# Patient Record
Sex: MALE | Race: OTHER | NOT HISPANIC OR LATINO | URBAN - METROPOLITAN AREA
[De-identification: names, ages, dates, MRNs, and addresses within clinical notes are randomized per-mention and may not be internally consistent; named-entity substitution may affect disease eponyms.]

---

## 2022-01-28 ENCOUNTER — EMERGENCY (EMERGENCY)
Facility: HOSPITAL | Age: 47
LOS: 1 days | Discharge: HOME | End: 2022-01-28
Attending: EMERGENCY MEDICINE
Payer: MEDICARE

## 2022-01-28 VITALS
SYSTOLIC BLOOD PRESSURE: 164 MMHG | TEMPERATURE: 99 F | RESPIRATION RATE: 18 BRPM | HEART RATE: 82 BPM | DIASTOLIC BLOOD PRESSURE: 76 MMHG | OXYGEN SATURATION: 99 %

## 2022-01-28 VITALS
OXYGEN SATURATION: 99 % | SYSTOLIC BLOOD PRESSURE: 181 MMHG | HEART RATE: 87 BPM | WEIGHT: 165.35 LBS | HEIGHT: 70 IN | DIASTOLIC BLOOD PRESSURE: 92 MMHG | TEMPERATURE: 98 F | RESPIRATION RATE: 20 BRPM

## 2022-01-28 LAB
ALBUMIN SERPL ELPH-MCNC: 4.7 G/DL — SIGNIFICANT CHANGE UP (ref 3.3–5)
ALP SERPL-CCNC: 84 U/L — SIGNIFICANT CHANGE UP (ref 40–120)
ALT FLD-CCNC: 35 U/L — SIGNIFICANT CHANGE UP (ref 10–45)
ANION GAP SERPL CALC-SCNC: 14 MMOL/L — SIGNIFICANT CHANGE UP (ref 5–17)
APTT BLD: 37.5 SEC — HIGH (ref 27.5–35.5)
AST SERPL-CCNC: 24 U/L — SIGNIFICANT CHANGE UP (ref 10–40)
BASOPHILS # BLD AUTO: 0.03 K/UL — SIGNIFICANT CHANGE UP (ref 0–0.2)
BASOPHILS NFR BLD AUTO: 0.3 % — SIGNIFICANT CHANGE UP (ref 0–2)
BILIRUB SERPL-MCNC: 0.1 MG/DL — LOW (ref 0.2–1.2)
BUN SERPL-MCNC: 16 MG/DL — SIGNIFICANT CHANGE UP (ref 7–23)
CALCIUM SERPL-MCNC: 10.1 MG/DL — SIGNIFICANT CHANGE UP (ref 8.4–10.5)
CHLORIDE SERPL-SCNC: 101 MMOL/L — SIGNIFICANT CHANGE UP (ref 96–108)
CO2 SERPL-SCNC: 24 MMOL/L — SIGNIFICANT CHANGE UP (ref 22–31)
CREAT SERPL-MCNC: 0.71 MG/DL — SIGNIFICANT CHANGE UP (ref 0.5–1.3)
CRP SERPL-MCNC: <3 MG/L — SIGNIFICANT CHANGE UP (ref 0–4)
EOSINOPHIL # BLD AUTO: 0.19 K/UL — SIGNIFICANT CHANGE UP (ref 0–0.5)
EOSINOPHIL NFR BLD AUTO: 1.6 % — SIGNIFICANT CHANGE UP (ref 0–6)
GLUCOSE SERPL-MCNC: 97 MG/DL — SIGNIFICANT CHANGE UP (ref 70–99)
HCT VFR BLD CALC: 43.8 % — SIGNIFICANT CHANGE UP (ref 39–50)
HGB BLD-MCNC: 14.9 G/DL — SIGNIFICANT CHANGE UP (ref 13–17)
IMM GRANULOCYTES NFR BLD AUTO: 0.4 % — SIGNIFICANT CHANGE UP (ref 0–1.5)
INR BLD: 1.03 RATIO — SIGNIFICANT CHANGE UP (ref 0.88–1.16)
LYMPHOCYTES # BLD AUTO: 28.7 % — SIGNIFICANT CHANGE UP (ref 13–44)
LYMPHOCYTES # BLD AUTO: 3.38 K/UL — HIGH (ref 1–3.3)
MCHC RBC-ENTMCNC: 31.9 PG — SIGNIFICANT CHANGE UP (ref 27–34)
MCHC RBC-ENTMCNC: 34 GM/DL — SIGNIFICANT CHANGE UP (ref 32–36)
MCV RBC AUTO: 93.8 FL — SIGNIFICANT CHANGE UP (ref 80–100)
MONOCYTES # BLD AUTO: 0.68 K/UL — SIGNIFICANT CHANGE UP (ref 0–0.9)
MONOCYTES NFR BLD AUTO: 5.8 % — SIGNIFICANT CHANGE UP (ref 2–14)
NEUTROPHILS # BLD AUTO: 7.45 K/UL — HIGH (ref 1.8–7.4)
NEUTROPHILS NFR BLD AUTO: 63.2 % — SIGNIFICANT CHANGE UP (ref 43–77)
NRBC # BLD: 0 /100 WBCS — SIGNIFICANT CHANGE UP (ref 0–0)
PLATELET # BLD AUTO: 253 K/UL — SIGNIFICANT CHANGE UP (ref 150–400)
POTASSIUM SERPL-MCNC: 3.8 MMOL/L — SIGNIFICANT CHANGE UP (ref 3.5–5.3)
POTASSIUM SERPL-SCNC: 3.8 MMOL/L — SIGNIFICANT CHANGE UP (ref 3.5–5.3)
PROT SERPL-MCNC: 7.8 G/DL — SIGNIFICANT CHANGE UP (ref 6–8.3)
PROTHROM AB SERPL-ACNC: 12.3 SEC — SIGNIFICANT CHANGE UP (ref 10.6–13.6)
RBC # BLD: 4.67 M/UL — SIGNIFICANT CHANGE UP (ref 4.2–5.8)
RBC # FLD: 12.8 % — SIGNIFICANT CHANGE UP (ref 10.3–14.5)
SARS-COV-2 RNA SPEC QL NAA+PROBE: SIGNIFICANT CHANGE UP
SODIUM SERPL-SCNC: 139 MMOL/L — SIGNIFICANT CHANGE UP (ref 135–145)
WBC # BLD: 11.78 K/UL — HIGH (ref 3.8–10.5)
WBC # FLD AUTO: 11.78 K/UL — HIGH (ref 3.8–10.5)

## 2022-01-28 PROCEDURE — 86140 C-REACTIVE PROTEIN: CPT

## 2022-01-28 PROCEDURE — 73630 X-RAY EXAM OF FOOT: CPT

## 2022-01-28 PROCEDURE — 80053 COMPREHEN METABOLIC PANEL: CPT

## 2022-01-28 PROCEDURE — 85730 THROMBOPLASTIN TIME PARTIAL: CPT

## 2022-01-28 PROCEDURE — 85025 COMPLETE CBC W/AUTO DIFF WBC: CPT

## 2022-01-28 PROCEDURE — 36415 COLL VENOUS BLD VENIPUNCTURE: CPT

## 2022-01-28 PROCEDURE — 99284 EMERGENCY DEPT VISIT MOD MDM: CPT | Mod: 25

## 2022-01-28 PROCEDURE — 96374 THER/PROPH/DIAG INJ IV PUSH: CPT

## 2022-01-28 PROCEDURE — 87205 SMEAR GRAM STAIN: CPT

## 2022-01-28 PROCEDURE — 73630 X-RAY EXAM OF FOOT: CPT | Mod: 26,RT

## 2022-01-28 PROCEDURE — 99284 EMERGENCY DEPT VISIT MOD MDM: CPT

## 2022-01-28 PROCEDURE — 99243 OFF/OP CNSLTJ NEW/EST LOW 30: CPT | Mod: GC

## 2022-01-28 PROCEDURE — 87070 CULTURE OTHR SPECIMN AEROBIC: CPT

## 2022-01-28 PROCEDURE — 85652 RBC SED RATE AUTOMATED: CPT

## 2022-01-28 PROCEDURE — U0003: CPT

## 2022-01-28 PROCEDURE — 93923 UPR/LXTR ART STDY 3+ LVLS: CPT

## 2022-01-28 PROCEDURE — 85610 PROTHROMBIN TIME: CPT

## 2022-01-28 PROCEDURE — U0005: CPT

## 2022-01-28 RX ORDER — MUPIROCIN 20 MG/G
1 OINTMENT TOPICAL
Qty: 1 | Refills: 0
Start: 2022-01-28 | End: 2022-02-03

## 2022-01-28 RX ORDER — AMPICILLIN SODIUM AND SULBACTAM SODIUM 250; 125 MG/ML; MG/ML
3 INJECTION, POWDER, FOR SUSPENSION INTRAMUSCULAR; INTRAVENOUS EVERY 6 HOURS
Refills: 0 | Status: DISCONTINUED | OUTPATIENT
Start: 2022-01-28 | End: 2022-01-28

## 2022-01-28 RX ORDER — AMPICILLIN SODIUM AND SULBACTAM SODIUM 250; 125 MG/ML; MG/ML
3 INJECTION, POWDER, FOR SUSPENSION INTRAMUSCULAR; INTRAVENOUS ONCE
Refills: 0 | Status: COMPLETED | OUTPATIENT
Start: 2022-01-28 | End: 2022-01-28

## 2022-01-28 RX ORDER — PIPERACILLIN AND TAZOBACTAM 4; .5 G/20ML; G/20ML
3.38 INJECTION, POWDER, LYOPHILIZED, FOR SOLUTION INTRAVENOUS ONCE
Refills: 0 | Status: DISCONTINUED | OUTPATIENT
Start: 2022-01-28 | End: 2022-01-28

## 2022-01-28 RX ORDER — MUPIROCIN 20 MG/G
1 OINTMENT TOPICAL
Refills: 0 | Status: DISCONTINUED | OUTPATIENT
Start: 2022-01-28 | End: 2022-02-01

## 2022-01-28 RX ADMIN — AMPICILLIN SODIUM AND SULBACTAM SODIUM 200 GRAM(S): 250; 125 INJECTION, POWDER, FOR SUSPENSION INTRAMUSCULAR; INTRAVENOUS at 18:20

## 2022-01-28 RX ADMIN — MUPIROCIN 1 APPLICATION(S): 20 OINTMENT TOPICAL at 19:47

## 2022-01-28 NOTE — ED PROVIDER NOTE - SKIN WOUND TYPE
R foot: + 1cm ulcer with minimal yellow drainage to medial aspect of R 5th toe; + mild 5th toe erythema, no other skin changes, sensation intact, ROM of toes intact, +DP pulse R foot: + 1cm ulcer with minimal yellow drainage to medial aspect of R 5th toe; + mild 5th toe erythema, no other skin changes, sensation intact, ROM of toes intact, + faint DP pulse

## 2022-01-28 NOTE — ED ADULT NURSE NOTE - OBJECTIVE STATEMENT
46yr old male w/ pmhx of PAD, HTN and smoker for 20 years presents to ED c/o toe wound. pt states about 3 weeks ago he develop blue/red discoloration on his right 5th toe, as a few weeks went by the discoloration progressed to a non-healing wound. Pt went to his PCP who referred him to a podiatrist, who then sent pt to a vascular surgeon. Pt states his vascular surgeon advised him to come to ED for concern of necrosis of the toe and states he may need angiogram. Pt is able to ambulate without difficulty. he denies any trauma to the toe, numbness, tingling of affected area, Fevers/chills, SOB, CP. Pt has full sensation and ROM of toes, +2 pedal pulses noted upon palpation. Pt assessed by MD, bed lowered and locked, call bell within reach. will reassess

## 2022-01-28 NOTE — ED PROVIDER NOTE - OBJECTIVE STATEMENT
47yo M with HTN, PAD (no stents), smoker (x 20 yrs) presenting with non-healing wound to R 5th toe x 3 weeks. Reports first noticed blue discoloration to toe 3 weeks ago, then wound developed, and PCP sent pt to podiatrist. Pt reports he took course of Augmentin however wound is worsening. Podiatrist sent patient to vascular surgeon Dr. Chino who sent patient in today with concern for necrotic wound and for angiogram. Patient reports he is able to ambulate. Denies any fever/chills, n/v, numbness/tingling, wounds like this before, h/o DM. 47yo M with HTN, PAD (no stents), smoker (x 20 yrs) presenting with non-healing wound to R 5th toe x 3 weeks. Reports first noticed blue discoloration to toe 3 weeks ago, then wound developed, and PCP sent pt to podiatrist. Pt reports he took course of Augmentin however wound is worsening. Podiatrist sent patient to vascular surgeon Dr. Chino who sent patient in today with concern for necrotic wound and for angiogram. Patient reports he is able to ambulate, denies leg pain despite triage note. Denies any fever/chills, n/v, numbness/tingling, wounds like this before, h/o DM.

## 2022-01-28 NOTE — ED PROVIDER NOTE - PROVIDER TOKENS
PROVIDER:[TOKEN:[[id number]],FOLLOWUP:[1-3 Days]],PROVIDER:[TOKEN:[[id number]],FOLLOWUP:[1-3 Days]]

## 2022-01-28 NOTE — ED PROVIDER NOTE - PATIENT PORTAL LINK FT
You can access the FollowMyHealth Patient Portal offered by [facility] by registering at the following website: [email or web address]. By joining [facility]’s FollowMyHealth portal, you will also be able to view your health information using other applications (apps) compatible with our system.

## 2022-01-28 NOTE — ED PROVIDER NOTE - PROGRESS NOTE DETAILS
Left msg with Dr. Chino's service. - Fina Salazar PA-C Spoke with Dr. Chino, saw patient for first time today. Reports concern for severe PAD, ischemic R foot, sent patient in for vascular eval. Vascular paged. - Fina Salazar PA-C Pt seen by podiatry, exam does not correlate with OM noted on xray, recommending d/c home on mupirocin and augmentin x 1 week as pt reported he did improve after taking previous course of abx. Dr. Reese confirmed that VIN study will not be performed after 5pm, pt to get as outpatient. Vascular recommending f/u with Dr. Dsouza. - CRIS BaconC

## 2022-01-28 NOTE — ED PROVIDER NOTE - CARE PROVIDERS DIRECT ADDRESSES
,[email or web address]

## 2022-01-28 NOTE — ED PROVIDER NOTE - ATTENDING CONTRIBUTION TO CARE
45yo M with HTN, PAD (no stents), smoker (x 20 yrs) presenting with non-healing wound to R 5th toe x 3 weeks with redness to outter portion, possible cold related has been outside a lot, finished course of augmentin for one week, better as per patient, saw outpt vascular today sent in for work up, no skin changes, plus one pedial dorsalis pulse, small redness, no ulceration to toe has seen podiatry, no h/o dm, pos smoker, labs, films, vascular consulted.

## 2022-01-28 NOTE — CONSULT NOTE ADULT - ASSESSMENT
46M PMHx HTN, current smoker presenting with non-healing R foot wound. Vascular Surgery consulted for evaluation of PAD.     PLAN:   - No indication for acute vascular surgical interventions  - Obtain VIN/PVRs  - Interventions pending above studies  - Wound care   - Continue abx   - Appreciate Podiatry eval    Plan to be Discussed with Fellow  Plan to be discussed with Attending, Dr. Dsouza  Vascular Surgery  [id number]

## 2022-01-28 NOTE — ED PROVIDER NOTE - CARE PROVIDER_API CALL
Chester Dsouza)  Vascular Surgery  [address], [address] [place] [address]  Phone: [phone]  Fax: [phone]  Follow Up Time: 1-3 Days    Masoud Becker (DPM)  Surgery Orthopaedic Surgery  [address], [address]  [place] [address]  Phone: [phone]  Fax: [phone]  Follow Up Time: 1-3 Days

## 2022-01-28 NOTE — ED PROVIDER NOTE - NSFOLLOWUPINSTRUCTIONS_ED_ALL_ED_FT
Please apply mupirocin (antibiotic ointment) 2-3 times daily to wound and cover with band-aid for 1 week.    Take Augmentin (antibiotic) twice daily for 1 week.     Take Ibuprofen (i.e. Motrin, Advil) 600mg every 8 hrs for pain as needed. Take with food.   May alternate with Acetaminophen (Tylenol) 650mg every 6 hours for pain as needed.     Please follow up with Vascular Surgeon and Podiatry upon discharge.   Information provided.     Return to ER for any fever/chills, nausea/vomiting, increased pain/swelling/redness of toe/foot, persistent yellow/green drainage from wound, spreading redness, unable to walk, or any other concerns.

## 2022-01-28 NOTE — CONSULT NOTE ADULT - SUBJECTIVE AND OBJECTIVE BOX
Podiatry pager #: [phone] ([facility])/ [phone] ([facility])    Patient is a 46y old  Male who presents with a chief complaint of right foot 5th digit wound     HPI:  45yo M with HTN, PAD (no stents), smoker (x 20 yrs) presenting with non-healing wound to R 5th toe x 3 weeks. Reports first noticed blue discoloration to toe 3 weeks ago, then wound developed, and PCP sent pt to podiatrist. Pt reports he took course of Augmentin however wound is worsening. Podiatrist sent patient to vascular surgeon Dr. Chino who sent patient in today with concern for necrotic wound and for angiogram. Patient reports he is able to ambulate, denies leg pain despite triage note. Denies any fever/chills, n/v, numbness/tingling, wounds like this before, h/o DM.    PAST MEDICAL & SURGICAL HISTORY:      MEDICATIONS  (STANDING):  ampicillin/sulbactam  IVPB 3 Gram(s) IV Intermittent every 6 hours    MEDICATIONS  (PRN):      Allergies    No Known Allergies    Intolerances        VITALS:    Vital Signs Last 24 Hrs  T(C): 37.3 (28 Jan 2022 16:54), Max: 37.3 (28 Jan 2022 16:54)  T(F): 99.1 (28 Jan 2022 16:54), Max: 99.1 (28 Jan 2022 16:54)  HR: 79 (28 Jan 2022 16:54) (79 - 87)  BP: 160/85 (28 Jan 2022 16:54) (160/85 - 181/92)  BP(mean): --  RR: 19 (28 Jan 2022 16:54) (19 - 20)  SpO2: 98% (28 Jan 2022 16:54) (98% - 99%)    LABS:                          14.9   11.78 )-----------( 253      ( 28 Jan 2022 17:29 )             43.8               CAPILLARY BLOOD GLUCOSE          PT/INR - ( 28 Jan 2022 17:29 )   PT: 12.3 sec;   INR: 1.03 ratio         PTT - ( 28 Jan 2022 17:29 )  PTT:37.5 sec    LOWER EXTREMITY PHYSICAL EXAM:    Vascular: DP/PT 0/4 B/L, CFT >3 seconds B/L, Temperature gradient warm to cool B/L  Neuro: Epicritic sensation intact to the level of digits B/L  Musculoskeletal/Ortho: unremarkable   Skin: Right foot distal medial 5th digit wound to subq, fibrogranular, no probing to bone, no fluctuance, no drainage, no malodor, ischemic/dusky appearance of distal 5th digit to level of PIPJ  Ischemic changes to b/l feet distal aspect of digits 1-5 R>L     RADIOLOGY & ADDITIONAL STUDIES:    < from: Xray Foot AP + Lateral + Oblique, Right (01.28.22 @ 17:29) >    ******PRELIMINARY REPORT******      ******PRELIMINARY REPORT******       ACC: [id number] EXAM:  XR FOOT COMP MIN 3 VIEWS RT                          PROCEDURE DATE:  01/28/2022    ******PRELIMINARY REPORT******      ******PRELIMINARY REPORT******           INTERPRETATION:  Cortical eorision of the fifth middle phalax, concerning   for osteomyelitis.        ******PRELIMINARY REPORT******      ******PRELIMINARY REPORT******       AIME STONE MD; Resident Radiology    < end of copied text >  
VASCULAR SURGERY CONSULT NOTE  JENIFFER HE  |  [id number]  |  01-28-22 @ 18:56    Patient was seen and examined at: 18:30    CC: Patient is a 46y old  Male who presents with a chief complaint of non-healing R 5th toe wound  HPI:  47yo M with HTN, PAD (no stents), smoker (x 20 yrs) presenting with non-healing wound to R 5th toe x 3 weeks. Reports first noticed blue discoloration to toe 3 weeks ago, then wound developed, and PCP sent pt to podiatrist. Pt reports he took course of Augmentin however wound is worsening. Podiatrist sent patient to vascular surgeon Dr. Chino who sent patient in today with concern for necrotic wound and for angiogram. Patient reports he is able to ambulate, denies leg pain despite triage note. Denies any fever/chills, n/v, numbness/tingling, wounds like this before, h/o DM.    Vascular Surgery consulted for evaluation for PAD. Patient reports first noticing R 5th toe wound around end of December, does not recall trauma, falls, inciting injury to toe. Some associated pain, only when toe is manipulated. Able to independently ambulate, denies rest pain or claudication. Notes no drainage, no purulence. When wound wasn't improving, went to PCP who sent patient to podiatrist, was placed on Augmentin which he completed with no changes to wound. Podiatrist sent patient to Vascular Surgeon Dr. Chino, who performed VIN/PVRs (unclear what they showed) and recommended angiogram and sent patient to ED. Denies f/c, SOB/CP/VILLATORO, weakness, fatigue, motor/sensory changes of BLE    In ED, patient afebrile and hemodynamically stable, WBC 11.7, XR R foot c/f OM.       REVIEW OF SYSTEMS:  General: denies weight change, fever or fatigue  HEENT: denies sore throat, hoarseness  Respiratory: denies cough, shortness of breath at rest and on exertion, wheezing  Cardiovascular: denies chest pain, abnormal heart rhythm, PND, palpitations  Gastrointestinal: denies nausea, vomiting, diarrhea, bloody or black bowel movements  Genitourinary: denies frequent urination, painful urination, kidney disease  Neurological: denies seizures, headaches  Muscoloskeletal: denies any joint pains  Psychiatric: denies depression, anxiety    PAST MEDICAL & SURGICAL HISTORY:  HTN    MEDICATIONS  (STANDING):  mupirocin 2% Ointment 1 Application(s) Topical two times a day    MEDICATIONS  (PRN):    Allergies  No Known Allergies    SOCIAL HISTORY:  Occupation: construction  Smoking Hx: 20ppd for past 20 years  Etoh Hx: quit 2 years ago, used to drink 5-6 beers and/or "a few shots" a day   IVDA Hx: denies    FAMILY HISTORY:  Father with smoking history, CAD, ?bypass    Objective:   Vital Signs Last 24 Hrs  T(C): 37.2 (28 Jan 2022 19:54), Max: 37.3 (28 Jan 2022 16:54)  T(F): 99 (28 Jan 2022 19:54), Max: 99.1 (28 Jan 2022 16:54)  HR: 82 (28 Jan 2022 19:54) (79 - 87)  BP: 164/76 (28 Jan 2022 19:54) (160/85 - 181/92)  BP(mean): --  RR: 18 (28 Jan 2022 19:54) (18 - 20)  SpO2: 99% (28 Jan 2022 19:54) (98% - 99%)  CAPILLARY BLOOD GLUCOSE    Physical Exam:  General: NAD, Lying in bed comfortably  Neuro: A+Ox3  Cardio: RRR  Resp: Good effort  GI/Abd: Soft, NT/ND, no rebound/guarding, no masses palpated  Vascular: All 4 extremities warm, B/l radial pulses palpable, b/l Femoral/popliteal/DP/PT pulse palpable  Musculoskeletal: All 4 extremities moving spontaneously, no limitations  Wound: R 5th toe with fibrinous exudate,     LABS:                        14.9   11.78 )-----------( 253      ( 28 Jan 2022 17:29 )             43.8     01-28    139  |  101  |  16  ----------------------------<  97  3.8   |  24  |  0.71    Ca    10.1      28 Jan 2022 17:29    TPro  7.8  /  Alb  4.7  /  TBili  0.1<L>  /  DBili  x   /  AST  24  /  ALT  35  /  AlkPhos  84  01-28    PT/INR - ( 28 Jan 2022 17:29 )   PT: 12.3 sec;   INR: 1.03 ratio         PTT - ( 28 Jan 2022 17:29 )  PTT:37.5 sec  LIVER FUNCTIONS - ( 28 Jan 2022 17:29 )  Alb: 4.7 g/dL / Pro: 7.8 g/dL / ALK PHOS: 84 U/L / ALT: 35 U/L / AST: 24 U/L / GGT: x           RADIOLOGY & ADDITIONAL STUDIES:  < from: Xray Foot AP + Lateral + Oblique, Right (01.28.22 @ 17:29) >  ACC: [id number] EXAM:  XR FOOT COMP MIN 3 VIEWS RT                          PROCEDURE DATE:  01/28/2022    ******PRELIMINARY REPORT******      ******PRELIMINARY REPORT******           INTERPRETATION:  Cortical eorision of the fifth middle phalax, concerning   for osteomyelitis.

## 2022-01-28 NOTE — CONSULT NOTE ADULT - ASSESSMENT
45yo M w/ right foot 5th digit wound  - pt seen and evaluated  - afebrile to 99.1, WBC 11.78, ESR/CRP pending  - Right foot distal medial 5th digit wound to subq, fibrogranular, no probing to bone, no fluctuance, no drainage, no malodor, ischemic/dusky appearance of distal 5th digit to level of PIPJ; Ischemic changes to b/l feet distal aspect of digits 1-5 R>L  - Left foot no open wounds or lesions   - Right foot xray preliminary read reports cortical erosion of 5th middle phalanx concerning for OM -- clinically the wound does not correlate with this location of erosion, low concern clinically for OM 2/2 wound does not probe to bone   - Right foot wound culture taken  - recommend application of mupirocin daily to right foot wound w/ bandaid  - recommend vasc consult   - ordered VIN/PVR   - no acute pod surgical intervention necessary at this time   - pod plan [facility] if pt is to be admitted for vascular workup, otherwise would recommend monitoring outpatient in office w/ d/c on PO Augmentin for 1 week  - discussed w/ attending   - discussed w/ attending

## 2022-01-29 LAB — ERYTHROCYTE [SEDIMENTATION RATE] IN BLOOD: 36 MM/HR — HIGH (ref 0–15)

## 2022-01-29 NOTE — ED POST DISCHARGE NOTE - ADDITIONAL DOCUMENTATION
2/2/22- daughter called, concerned about getting her father a rapid appointment. spoke to our f/up coordinator will call reach out to them again and help.

## 2022-01-29 NOTE — ED POST DISCHARGE NOTE - DETAILS
Left voicemail informing that I had results to discuss with the patient, gave call back number. -Brenden Lopez PA-C Pt and daughter called back, results discussed, pt reports pain feeling improved compared to time of ED visit. daughter reports may have difficulty following with Dr. Dsouza due to pt lack of insurance, pt information given to referral coordinator and daughter informed that they will be called in 2 business days. -Brenden Lopez PA-C

## 2022-02-02 LAB
CULTURE RESULTS: SIGNIFICANT CHANGE UP
SPECIMEN SOURCE: SIGNIFICANT CHANGE UP

## 2022-02-04 ENCOUNTER — APPOINTMENT (OUTPATIENT)
Dept: FAMILY MEDICINE | Facility: HOSPITAL | Age: 47
End: 2022-02-04

## 2022-02-04 ENCOUNTER — OUTPATIENT (OUTPATIENT)
Dept: OUTPATIENT SERVICES | Facility: HOSPITAL | Age: 47
LOS: 1 days | End: 2022-02-04
Payer: SELF-PAY

## 2022-02-04 VITALS — SYSTOLIC BLOOD PRESSURE: 167 MMHG | DIASTOLIC BLOOD PRESSURE: 89 MMHG

## 2022-02-04 VITALS
SYSTOLIC BLOOD PRESSURE: 169 MMHG | OXYGEN SATURATION: 99 % | TEMPERATURE: 97.6 F | DIASTOLIC BLOOD PRESSURE: 103 MMHG | RESPIRATION RATE: 16 BRPM | WEIGHT: 162 LBS | HEIGHT: 67.75 IN | HEART RATE: 72 BPM | BODY MASS INDEX: 24.84 KG/M2

## 2022-02-04 DIAGNOSIS — Z00.00 ENCOUNTER FOR GENERAL ADULT MEDICAL EXAMINATION WITHOUT ABNORMAL FINDINGS: ICD-10-CM

## 2022-02-04 PROCEDURE — 36415 COLL VENOUS BLD VENIPUNCTURE: CPT

## 2022-02-04 PROCEDURE — 83036 HEMOGLOBIN GLYCOSYLATED A1C: CPT

## 2022-02-04 PROCEDURE — G0463: CPT

## 2022-02-04 PROCEDURE — 86803 HEPATITIS C AB TEST: CPT

## 2022-02-04 PROCEDURE — 87389 HIV-1 AG W/HIV-1&-2 AB AG IA: CPT

## 2022-02-04 PROCEDURE — 80061 LIPID PANEL: CPT

## 2022-02-05 DIAGNOSIS — Z87.891 PERSONAL HISTORY OF NICOTINE DEPENDENCE: ICD-10-CM

## 2022-02-05 DIAGNOSIS — I73.9 PERIPHERAL VASCULAR DISEASE, UNSPECIFIED: ICD-10-CM

## 2022-02-05 DIAGNOSIS — I10 ESSENTIAL (PRIMARY) HYPERTENSION: ICD-10-CM

## 2022-02-05 DIAGNOSIS — Z29.9 ENCOUNTER FOR PROPHYLACTIC MEASURES, UNSPECIFIED: ICD-10-CM

## 2022-02-09 ENCOUNTER — APPOINTMENT (OUTPATIENT)
Dept: PODIATRY | Facility: HOSPITAL | Age: 47
End: 2022-02-09

## 2022-02-10 LAB
CHOLEST SERPL-MCNC: 199 MG/DL
ESTIMATED AVERAGE GLUCOSE: 126 MG/DL
HBA1C MFR BLD HPLC: 6 %
HCV AB SER QL: NONREACTIVE
HCV S/CO RATIO: 0.2 S/CO
HDLC SERPL-MCNC: 38 MG/DL
HIV1+2 AB SPEC QL IA.RAPID: NONREACTIVE
LDLC SERPL CALC-MCNC: 127 MG/DL
NONHDLC SERPL-MCNC: 161 MG/DL
TRIGL SERPL-MCNC: 167 MG/DL

## 2022-02-11 ENCOUNTER — APPOINTMENT (OUTPATIENT)
Dept: FAMILY MEDICINE | Facility: HOSPITAL | Age: 47
End: 2022-02-11

## 2022-02-11 ENCOUNTER — OUTPATIENT (OUTPATIENT)
Dept: OUTPATIENT SERVICES | Facility: HOSPITAL | Age: 47
LOS: 1 days | End: 2022-02-11
Payer: MEDICAID

## 2022-02-11 VITALS
OXYGEN SATURATION: 100 % | WEIGHT: 157 LBS | TEMPERATURE: 96.8 F | DIASTOLIC BLOOD PRESSURE: 76 MMHG | BODY MASS INDEX: 24.05 KG/M2 | HEART RATE: 70 BPM | SYSTOLIC BLOOD PRESSURE: 162 MMHG | RESPIRATION RATE: 14 BRPM

## 2022-02-11 DIAGNOSIS — Z00.00 ENCOUNTER FOR GENERAL ADULT MEDICAL EXAMINATION W/OUT ABNORMAL FINDINGS: ICD-10-CM

## 2022-02-11 DIAGNOSIS — Z00.00 ENCOUNTER FOR GENERAL ADULT MEDICAL EXAMINATION WITHOUT ABNORMAL FINDINGS: ICD-10-CM

## 2022-02-11 PROCEDURE — G0463: CPT

## 2022-02-11 NOTE — PHYSICAL EXAM
[Normal] : no carotid or abdominal bruits heard, no varicosities, pedal pulses are present, no peripheral edema, no extremity clubbing or cyanosis and no palpable aorta [de-identified] : small healing ulcer on 5th toe of right foot. non erythematous

## 2022-02-17 DIAGNOSIS — I10 ESSENTIAL (PRIMARY) HYPERTENSION: ICD-10-CM

## 2022-02-17 DIAGNOSIS — I73.9 PERIPHERAL VASCULAR DISEASE, UNSPECIFIED: ICD-10-CM

## 2022-02-23 NOTE — PHYSICAL EXAM
[Normal] : affect was normal and insight and judgment were intact [de-identified] : small healing ulcer on 5th toe of right foot. non erythematous

## 2022-02-23 NOTE — HISTORY OF PRESENT ILLNESS
[FreeTextEntry1] : CPE [de-identified] : 45 y/o male w/ PMH HTN, PAD presents for CPE. PAtient last seen earlier in the month  for post ED discharge visit. Pt  in the ER at [place] for a foot lesion on his right foot, possible necrotic in nature. Dr. Chino, vascular surgeon, performed a VIN which was abnormal and recommended an angiogram with possible stents and was referred to this practice. Patient smoked 1 pack a day for over 25 years and quick a week ago. Patient with no other complaints. Denies fevers, chills, pain on foot, SOB, chest pain. \par

## 2022-02-23 NOTE — PLAN
[FreeTextEntry1] : # HCM\par - Labs reviewed\par - counseled pt on diet and exercise\par - renewed medications\par - HIV and Hep c screen negative\par - RTC for possible CSP/ FIt test\par - Covid vaccinated x2- Refused flu vaccine\par \par #HTN\par - 162/76 in clinic, REPEAT 156/75\par -  Patient states he forgot to take his HTN medication today in the AM\par - CONTINUE HCTZ25 mg, metoprolol tartrate 25 mg and ramiprl 10mg daily\par \par #Prediabetes/HLD\par - counseled on diet and exercise\par - Pt would like to try to lose weight before starting additional medications\par \par RTC in 1-2 weekf for CSP/FIT test\par RTC for next cardio clinic as patient most likely has CAD due to history of PAD\par \par d/w Dr. Bruce

## 2022-02-23 NOTE — HEALTH RISK ASSESSMENT
[Good] : ~his/her~  mood as  good [Former] : Former [0-4] : 0-4 [Yes] : Yes [Never (0 pts)] : Never (0 points) [1 or 2 (0 pts)] : 1 or 2 (0 points) [Less than monthly (1 pt)] : Less than monthly (1 point) [No] : In the past 12 months have you used drugs other than those required for medical reasons? No [No falls in past year] : Patient reported no falls in the past year [0] : 2) Feeling down, depressed, or hopeless: Not at all (0) [PHQ-2 Negative - No further assessment needed] : PHQ-2 Negative - No further assessment needed [None] : None [With Family] : lives with family [Feels Safe at Home] : Feels safe at home [Fully functional (bathing, dressing, toileting, transferring, walking, feeding)] : Fully functional (bathing, dressing, toileting, transferring, walking, feeding) [Fully functional (using the telephone, shopping, preparing meals, housekeeping, doing laundry, using] : Fully functional and needs no help or supervision to perform IADLs (using the telephone, shopping, preparing meals, housekeeping, doing laundry, using transportation, managing medications and managing finances) [Smoke Detector] : smoke detector [Carbon Monoxide Detector] : carbon monoxide detector [Seat Belt] :  uses seat belt [Sunscreen] : uses sunscreen [Patient/Caregiver unclear of wishes] : , patient/caregiver unclear of wishes [YearQuit] : 2022 [de-identified] : "socially" [Change in mental status noted] : No change in mental status noted [Reports changes in vision] : Reports no changes in vision [HIVDate] : 02/2022 [HepatitisCDate] : 02/2022

## 2022-02-24 ENCOUNTER — APPOINTMENT (OUTPATIENT)
Dept: FAMILY MEDICINE | Facility: HOSPITAL | Age: 47
End: 2022-02-24

## 2022-03-03 ENCOUNTER — RESULT CHARGE (OUTPATIENT)
Age: 47
End: 2022-03-03

## 2022-03-04 ENCOUNTER — APPOINTMENT (OUTPATIENT)
Dept: CARDIOLOGY | Facility: HOSPITAL | Age: 47
End: 2022-03-04

## 2022-03-04 ENCOUNTER — OUTPATIENT (OUTPATIENT)
Dept: OUTPATIENT SERVICES | Facility: HOSPITAL | Age: 47
LOS: 1 days | End: 2022-03-04
Payer: SELF-PAY

## 2022-03-04 VITALS
SYSTOLIC BLOOD PRESSURE: 134 MMHG | HEART RATE: 79 BPM | TEMPERATURE: 99.2 F | RESPIRATION RATE: 16 BRPM | OXYGEN SATURATION: 98 % | DIASTOLIC BLOOD PRESSURE: 78 MMHG

## 2022-03-04 DIAGNOSIS — I35.8 OTHER NONRHEUMATIC AORTIC VALVE DISORDERS: ICD-10-CM

## 2022-03-04 DIAGNOSIS — Z00.00 ENCOUNTER FOR GENERAL ADULT MEDICAL EXAMINATION WITHOUT ABNORMAL FINDINGS: ICD-10-CM

## 2022-03-04 PROCEDURE — 93010 ELECTROCARDIOGRAM REPORT: CPT

## 2022-03-04 PROCEDURE — 93005 ELECTROCARDIOGRAM TRACING: CPT

## 2022-03-04 PROCEDURE — G0463: CPT

## 2022-03-07 DIAGNOSIS — I35.8 OTHER NONRHEUMATIC AORTIC VALVE DISORDERS: ICD-10-CM

## 2022-03-07 DIAGNOSIS — I73.9 PERIPHERAL VASCULAR DISEASE, UNSPECIFIED: ICD-10-CM

## 2022-03-08 ENCOUNTER — NON-APPOINTMENT (OUTPATIENT)
Age: 47
End: 2022-03-08

## 2022-03-09 ENCOUNTER — OUTPATIENT (OUTPATIENT)
Dept: OUTPATIENT SERVICES | Facility: HOSPITAL | Age: 47
LOS: 1 days | End: 2022-03-09
Payer: SELF-PAY

## 2022-03-09 ENCOUNTER — NON-APPOINTMENT (OUTPATIENT)
Age: 47
End: 2022-03-09

## 2022-03-09 ENCOUNTER — RESULT REVIEW (OUTPATIENT)
Age: 47
End: 2022-03-09

## 2022-03-09 DIAGNOSIS — I73.9 PERIPHERAL VASCULAR DISEASE, UNSPECIFIED: ICD-10-CM

## 2022-03-09 PROCEDURE — 93010 ELECTROCARDIOGRAM REPORT: CPT

## 2022-03-09 PROCEDURE — 93306 TTE W/DOPPLER COMPLETE: CPT | Mod: 26

## 2022-03-09 PROCEDURE — 93016 CV STRESS TEST SUPVJ ONLY: CPT

## 2022-03-09 PROCEDURE — 93018 CV STRESS TEST I&R ONLY: CPT

## 2022-03-09 PROCEDURE — 93017 CV STRESS TEST TRACING ONLY: CPT

## 2022-03-09 PROCEDURE — 93005 ELECTROCARDIOGRAM TRACING: CPT

## 2022-03-09 PROCEDURE — 78452 HT MUSCLE IMAGE SPECT MULT: CPT | Mod: 26,MH

## 2022-03-09 PROCEDURE — 93306 TTE W/DOPPLER COMPLETE: CPT

## 2022-03-09 PROCEDURE — 78452 HT MUSCLE IMAGE SPECT MULT: CPT | Mod: MH

## 2022-03-09 PROCEDURE — A9500: CPT

## 2022-03-09 RX ORDER — REGADENOSON 0.08 MG/ML
0.4 INJECTION, SOLUTION INTRAVENOUS ONCE
Refills: 0 | Status: COMPLETED | OUTPATIENT
Start: 2022-03-09 | End: 2022-03-09

## 2022-03-09 RX ADMIN — REGADENOSON 0.4 MILLIGRAM(S): 0.08 INJECTION, SOLUTION INTRAVENOUS at 10:30

## 2022-03-14 ENCOUNTER — APPOINTMENT (OUTPATIENT)
Dept: VASCULAR SURGERY | Facility: HOSPITAL | Age: 47
End: 2022-03-14
Payer: COMMERCIAL

## 2022-03-14 ENCOUNTER — OUTPATIENT (OUTPATIENT)
Dept: OUTPATIENT SERVICES | Facility: HOSPITAL | Age: 47
LOS: 1 days | End: 2022-03-14
Payer: SELF-PAY

## 2022-03-14 VITALS
SYSTOLIC BLOOD PRESSURE: 159 MMHG | HEART RATE: 64 BPM | HEIGHT: 67.75 IN | BODY MASS INDEX: 24.07 KG/M2 | WEIGHT: 157 LBS | DIASTOLIC BLOOD PRESSURE: 93 MMHG | TEMPERATURE: 96.8 F

## 2022-03-14 DIAGNOSIS — I73.9 PERIPHERAL VASCULAR DISEASE, UNSPECIFIED: ICD-10-CM

## 2022-03-14 PROCEDURE — G0463: CPT

## 2022-03-14 PROCEDURE — XXXXX: CPT

## 2022-03-14 NOTE — HISTORY OF PRESENT ILLNESS
[FreeTextEntry1] : 46M presents for evaluation of PAD. He states that he has previously been seen at an OSH with a nonhealing R toe wound in Jan of this year but it has since healed. He complains of claudication in both legs and new R sided rest pain that has been present for a few weeks. It is only relieved when he dangles his R foot off of the bd. He currently takes ASA 81. He was a smoker who packed 1PPD for 20 years but quit one month ago. \par \par He was seen by an outpatient provider who performed VIN/PVRs and told him that he needed revascularization. However, he does not have those records at this time.

## 2022-03-14 NOTE — PHYSICAL EXAM
[Normal Rate and Rhythm] : normal rate and rhythm [2+] : left 2+ [0] : left 0 [No Rash or Lesion] : No rash or lesion [Alert] : alert [Oriented to Person] : oriented to person [Oriented to Place] : oriented to place [Oriented to Time] : oriented to time [Calm] : calm [Ankle Swelling (On Exam)] : not present [Varicose Veins Of Lower Extremities] : not present [] : not present [Purpura] : no purpura  [Petechiae] : no petechiae [Skin Ulcer] : no ulcer [Skin Induration] : no induration [de-identified] : NAD

## 2022-03-14 NOTE — ASSESSMENT
[FreeTextEntry1] : 46M presents with bilateral LE claudication and R sided rest pain.\par - Will obtain VIN/PVRs\par - Patient instructed to follow up after test complete

## 2022-03-14 NOTE — REVIEW OF SYSTEMS
[As noted in HPI] : as noted in HPI [Limb Pain] : limb pain [Negative] : Gastrointestinal [Limb Swelling] : no limb swelling [Skin Lesions] : no skin lesions [Skin Wound] : no skin wound

## 2022-03-18 ENCOUNTER — APPOINTMENT (OUTPATIENT)
Dept: FAMILY MEDICINE | Facility: HOSPITAL | Age: 47
End: 2022-03-18

## 2022-03-18 ENCOUNTER — OUTPATIENT (OUTPATIENT)
Dept: OUTPATIENT SERVICES | Facility: HOSPITAL | Age: 47
LOS: 1 days | End: 2022-03-18
Payer: SELF-PAY

## 2022-03-18 VITALS
WEIGHT: 166 LBS | TEMPERATURE: 97.7 F | RESPIRATION RATE: 16 BRPM | DIASTOLIC BLOOD PRESSURE: 72 MMHG | BODY MASS INDEX: 25.43 KG/M2 | HEART RATE: 73 BPM | OXYGEN SATURATION: 98 % | SYSTOLIC BLOOD PRESSURE: 138 MMHG

## 2022-03-18 DIAGNOSIS — Z00.00 ENCOUNTER FOR GENERAL ADULT MEDICAL EXAMINATION WITHOUT ABNORMAL FINDINGS: ICD-10-CM

## 2022-03-18 PROCEDURE — G0463: CPT

## 2022-03-21 DIAGNOSIS — R94.39 ABNORMAL RESULT OF OTHER CARDIOVASCULAR FUNCTION STUDY: ICD-10-CM

## 2022-03-21 DIAGNOSIS — I10 ESSENTIAL (PRIMARY) HYPERTENSION: ICD-10-CM

## 2022-03-21 NOTE — HISTORY OF PRESENT ILLNESS
[FreeTextEntry1] : abnormal stress test  [de-identified] : 45 y/o M with PMHx HTN, smoker ( for 20 year , 1 pack at day, quit 4 weeks ago) came to the clinic for follow up of CAD and abnormal stress test . Pt last time seen at clinic was for a cardiology evaluation after was found to have PAD with  abnormal VIN.At that time pt reported went to a cardiology many years ago and was told " almost had a heart attack" .A stress test and ECHO ordered and pt started on Atorvastatin and ASA. Pt had a stress test on 3/9/22 and showed moderate to severe reversible perfusion in the apical cap, mid inferior wall and angiogram recommended. Pt was schedule for a left cardiac cath on March 24, 2022 at 7 am. Today pt denies chest pain, being the last episode when was having the stress test. He reports have not been smoking and is taking statin, ASA and metoprolol.He denies palpitations, SOB at exertion, leg swelling and any other symptoms.

## 2022-03-21 NOTE — PLAN
[FreeTextEntry1] : 45 y/o M with PMHx HTN, smoker ( for 20 year , 1 pack at day, quit 4 weeks ago) came to the clinic for follow up of CAD and abnormal stress test.\par \par #Abnormal stress test \par - Pt had a stress test on 3/9/22 and showed moderate to severe reversible perfusion in the apical cap, mid inferior wall and angiogram recommended.\par - Pt was schedule for angiogram on 3/24/22 \par - Pt denies any episode of chest pain at this moment but endorse on/off chest pain in the past \par - Pt was advised about the possible diagnosis and the high probability of CAD. Left cath  procedure discussed with the patient. I also discussed about the possibility to need bypass surgery if cath reveasl occlusion in three or more coronary arteries \par - Pt was strongly advised to go to the ED if develop chest pain, palpitation, SOB and any others symptoms.\par - Contin ASA, statin and Metoprolol \par - Continue follow up with cardiology \par \par #PAD \par - Pt had and VIN on January ,2020 that was abnormal and was recommended an arteriogram \par - Pt followed with vascular surgery and recommended LE angiogram  \par - Pt advised about the importance to quit smoking \par - Continue ASA and Atorvastatin 40 mg PO daily \par - Recommended to exercises\par \par #Preventive measures \par - Refused flu vaccine \par \par RTC in 2 weeks after left cardiac cath \par \par Case discussed with Dr Bruce

## 2022-03-24 ENCOUNTER — INPATIENT (INPATIENT)
Facility: HOSPITAL | Age: 47
LOS: 4 days | Discharge: HOME HEALTH | DRG: 233 | End: 2022-03-29
Attending: THORACIC SURGERY (CARDIOTHORACIC VASCULAR SURGERY) | Admitting: INTERNAL MEDICINE
Payer: MEDICAID

## 2022-03-24 VITALS
HEART RATE: 75 BPM | DIASTOLIC BLOOD PRESSURE: 65 MMHG | RESPIRATION RATE: 14 BRPM | TEMPERATURE: 98 F | HEIGHT: 68 IN | SYSTOLIC BLOOD PRESSURE: 148 MMHG | WEIGHT: 154.98 LBS

## 2022-03-24 DIAGNOSIS — I10 ESSENTIAL (PRIMARY) HYPERTENSION: ICD-10-CM

## 2022-03-24 DIAGNOSIS — R94.39 ABNORMAL RESULT OF OTHER CARDIOVASCULAR FUNCTION STUDY: ICD-10-CM

## 2022-03-24 DIAGNOSIS — I25.10 ATHEROSCLEROTIC HEART DISEASE OF NATIVE CORONARY ARTERY WITHOUT ANGINA PECTORIS: ICD-10-CM

## 2022-03-24 LAB
ALBUMIN SERPL ELPH-MCNC: 4.3 G/DL — SIGNIFICANT CHANGE UP (ref 3.3–5)
ALP SERPL-CCNC: 64 U/L — SIGNIFICANT CHANGE UP (ref 40–120)
ALT FLD-CCNC: 23 U/L — SIGNIFICANT CHANGE UP (ref 10–45)
ANION GAP SERPL CALC-SCNC: 13 MMOL/L — SIGNIFICANT CHANGE UP (ref 5–17)
ANION GAP SERPL CALC-SCNC: 13 MMOL/L — SIGNIFICANT CHANGE UP (ref 5–17)
APPEARANCE UR: CLEAR — SIGNIFICANT CHANGE UP
APTT BLD: 32.5 SEC — SIGNIFICANT CHANGE UP (ref 27.5–35.5)
AST SERPL-CCNC: 14 U/L — SIGNIFICANT CHANGE UP (ref 10–40)
BILIRUB SERPL-MCNC: 0.5 MG/DL — SIGNIFICANT CHANGE UP (ref 0.2–1.2)
BILIRUB UR-MCNC: NEGATIVE — SIGNIFICANT CHANGE UP
BLD GP AB SCN SERPL QL: NEGATIVE — SIGNIFICANT CHANGE UP
BUN SERPL-MCNC: 16 MG/DL — SIGNIFICANT CHANGE UP (ref 7–23)
BUN SERPL-MCNC: 16 MG/DL — SIGNIFICANT CHANGE UP (ref 7–23)
CALCIUM SERPL-MCNC: 9.5 MG/DL — SIGNIFICANT CHANGE UP (ref 8.4–10.5)
CALCIUM SERPL-MCNC: 9.8 MG/DL — SIGNIFICANT CHANGE UP (ref 8.4–10.5)
CHLORIDE SERPL-SCNC: 98 MMOL/L — SIGNIFICANT CHANGE UP (ref 96–108)
CHLORIDE SERPL-SCNC: 99 MMOL/L — SIGNIFICANT CHANGE UP (ref 96–108)
CO2 SERPL-SCNC: 25 MMOL/L — SIGNIFICANT CHANGE UP (ref 22–31)
CO2 SERPL-SCNC: 28 MMOL/L — SIGNIFICANT CHANGE UP (ref 22–31)
COLOR SPEC: SIGNIFICANT CHANGE UP
CREAT SERPL-MCNC: 0.77 MG/DL — SIGNIFICANT CHANGE UP (ref 0.5–1.3)
CREAT SERPL-MCNC: 0.78 MG/DL — SIGNIFICANT CHANGE UP (ref 0.5–1.3)
DIFF PNL FLD: NEGATIVE — SIGNIFICANT CHANGE UP
EGFR: 110 ML/MIN/1.73M2 — SIGNIFICANT CHANGE UP
EGFR: 110 ML/MIN/1.73M2 — SIGNIFICANT CHANGE UP
FIBRINOGEN PPP-MCNC: 593 MG/DL — HIGH (ref 330–520)
GLUCOSE SERPL-MCNC: 105 MG/DL — HIGH (ref 70–99)
GLUCOSE SERPL-MCNC: 122 MG/DL — HIGH (ref 70–99)
GLUCOSE UR QL: NEGATIVE — SIGNIFICANT CHANGE UP
HCT VFR BLD CALC: 39.1 % — SIGNIFICANT CHANGE UP (ref 39–50)
HCT VFR BLD CALC: 44.1 % — SIGNIFICANT CHANGE UP (ref 39–50)
HGB BLD-MCNC: 13.2 G/DL — SIGNIFICANT CHANGE UP (ref 13–17)
HGB BLD-MCNC: 14.8 G/DL — SIGNIFICANT CHANGE UP (ref 13–17)
INR BLD: 1.11 RATIO — SIGNIFICANT CHANGE UP (ref 0.88–1.16)
KETONES UR-MCNC: NEGATIVE — SIGNIFICANT CHANGE UP
LEUKOCYTE ESTERASE UR-ACNC: NEGATIVE — SIGNIFICANT CHANGE UP
MAGNESIUM SERPL-MCNC: 2.1 MG/DL — SIGNIFICANT CHANGE UP (ref 1.6–2.6)
MCHC RBC-ENTMCNC: 31.6 PG — SIGNIFICANT CHANGE UP (ref 27–34)
MCHC RBC-ENTMCNC: 31.7 PG — SIGNIFICANT CHANGE UP (ref 27–34)
MCHC RBC-ENTMCNC: 33.6 GM/DL — SIGNIFICANT CHANGE UP (ref 32–36)
MCHC RBC-ENTMCNC: 33.8 GM/DL — SIGNIFICANT CHANGE UP (ref 32–36)
MCV RBC AUTO: 93.8 FL — SIGNIFICANT CHANGE UP (ref 80–100)
MCV RBC AUTO: 94.2 FL — SIGNIFICANT CHANGE UP (ref 80–100)
NITRITE UR-MCNC: NEGATIVE — SIGNIFICANT CHANGE UP
NRBC # BLD: 0 /100 WBCS — SIGNIFICANT CHANGE UP (ref 0–0)
NRBC # BLD: 0 /100 WBCS — SIGNIFICANT CHANGE UP (ref 0–0)
NT-PROBNP SERPL-SCNC: 36 PG/ML — SIGNIFICANT CHANGE UP (ref 0–300)
PA ADP PRP-ACNC: 253 PRU — SIGNIFICANT CHANGE UP (ref 194–417)
PH UR: 7 — SIGNIFICANT CHANGE UP (ref 5–8)
PLATELET # BLD AUTO: 216 K/UL — SIGNIFICANT CHANGE UP (ref 150–400)
PLATELET # BLD AUTO: 271 K/UL — SIGNIFICANT CHANGE UP (ref 150–400)
POTASSIUM SERPL-MCNC: 3.7 MMOL/L — SIGNIFICANT CHANGE UP (ref 3.5–5.3)
POTASSIUM SERPL-MCNC: 3.7 MMOL/L — SIGNIFICANT CHANGE UP (ref 3.5–5.3)
POTASSIUM SERPL-SCNC: 3.7 MMOL/L — SIGNIFICANT CHANGE UP (ref 3.5–5.3)
POTASSIUM SERPL-SCNC: 3.7 MMOL/L — SIGNIFICANT CHANGE UP (ref 3.5–5.3)
PROT SERPL-MCNC: 7 G/DL — SIGNIFICANT CHANGE UP (ref 6–8.3)
PROT UR-MCNC: SIGNIFICANT CHANGE UP
PROTHROM AB SERPL-ACNC: 12.8 SEC — SIGNIFICANT CHANGE UP (ref 10.5–13.4)
RBC # BLD: 4.17 M/UL — LOW (ref 4.2–5.8)
RBC # BLD: 4.68 M/UL — SIGNIFICANT CHANGE UP (ref 4.2–5.8)
RBC # FLD: 12.8 % — SIGNIFICANT CHANGE UP (ref 10.3–14.5)
RBC # FLD: 12.8 % — SIGNIFICANT CHANGE UP (ref 10.3–14.5)
RH IG SCN BLD-IMP: POSITIVE — SIGNIFICANT CHANGE UP
SODIUM SERPL-SCNC: 137 MMOL/L — SIGNIFICANT CHANGE UP (ref 135–145)
SODIUM SERPL-SCNC: 139 MMOL/L — SIGNIFICANT CHANGE UP (ref 135–145)
SP GR SPEC: 1.05 — HIGH (ref 1.01–1.02)
UROBILINOGEN FLD QL: NEGATIVE — SIGNIFICANT CHANGE UP
WBC # BLD: 10.14 K/UL — SIGNIFICANT CHANGE UP (ref 3.8–10.5)
WBC # BLD: 10.64 K/UL — HIGH (ref 3.8–10.5)
WBC # FLD AUTO: 10.14 K/UL — SIGNIFICANT CHANGE UP (ref 3.8–10.5)
WBC # FLD AUTO: 10.64 K/UL — HIGH (ref 3.8–10.5)

## 2022-03-24 PROCEDURE — 93571 IV DOP VEL&/PRESS C FLO 1ST: CPT | Mod: 26,LD

## 2022-03-24 PROCEDURE — 93010 ELECTROCARDIOGRAM REPORT: CPT

## 2022-03-24 PROCEDURE — 93306 TTE W/DOPPLER COMPLETE: CPT | Mod: 26

## 2022-03-24 PROCEDURE — 93880 EXTRACRANIAL BILAT STUDY: CPT | Mod: 26

## 2022-03-24 PROCEDURE — 93572 IV DOP VEL&/PRESS C FLO EA: CPT | Mod: 26,LC

## 2022-03-24 PROCEDURE — 99152 MOD SED SAME PHYS/QHP 5/>YRS: CPT

## 2022-03-24 PROCEDURE — 99223 1ST HOSP IP/OBS HIGH 75: CPT | Mod: 57

## 2022-03-24 PROCEDURE — 93454 CORONARY ARTERY ANGIO S&I: CPT | Mod: 26

## 2022-03-24 PROCEDURE — 99253 IP/OBS CNSLTJ NEW/EST LOW 45: CPT

## 2022-03-24 RX ORDER — AMLODIPINE BESYLATE 2.5 MG/1
5 TABLET ORAL DAILY
Refills: 0 | Status: DISCONTINUED | OUTPATIENT
Start: 2022-03-24 | End: 2022-03-25

## 2022-03-24 RX ORDER — METOPROLOL TARTRATE 50 MG
25 TABLET ORAL DAILY
Refills: 0 | Status: DISCONTINUED | OUTPATIENT
Start: 2022-03-24 | End: 2022-03-25

## 2022-03-24 RX ORDER — LISINOPRIL 2.5 MG/1
1 TABLET ORAL
Qty: 0 | Refills: 0 | DISCHARGE

## 2022-03-24 RX ORDER — ATORVASTATIN CALCIUM 80 MG/1
40 TABLET, FILM COATED ORAL AT BEDTIME
Refills: 0 | Status: DISCONTINUED | OUTPATIENT
Start: 2022-03-24 | End: 2022-03-25

## 2022-03-24 RX ORDER — ASPIRIN/CALCIUM CARB/MAGNESIUM 324 MG
81 TABLET ORAL DAILY
Refills: 0 | Status: DISCONTINUED | OUTPATIENT
Start: 2022-03-24 | End: 2022-03-25

## 2022-03-24 RX ORDER — HYDROCHLOROTHIAZIDE 25 MG
25 TABLET ORAL DAILY
Refills: 0 | Status: DISCONTINUED | OUTPATIENT
Start: 2022-03-24 | End: 2022-03-25

## 2022-03-24 RX ADMIN — Medication 25 MILLIGRAM(S): at 21:48

## 2022-03-24 RX ADMIN — Medication 81 MILLIGRAM(S): at 21:47

## 2022-03-24 RX ADMIN — AMLODIPINE BESYLATE 5 MILLIGRAM(S): 2.5 TABLET ORAL at 21:47

## 2022-03-24 RX ADMIN — ATORVASTATIN CALCIUM 40 MILLIGRAM(S): 80 TABLET, FILM COATED ORAL at 21:48

## 2022-03-24 NOTE — CONSULT NOTE ADULT - ASSESSMENT
This is a 45 y/o male with strong family Hx of CAD (both parents with CABG at 60's), HTN, former smoker (1ppd x 20 years, quit 1 month ago), intermittent claudication and diagnosed with PAD after abnormal VIN in Jan 2022, Rt 5th toe non healing ulcer in Jan 2022 treated with IV and PO antibiotics for (pt followed by vascular Dr. Chino). Patient presented to PCP with c/o intermittent CP for few weeks and had abnormal NST on 3/9/22 now s/p cardiac cath demonstrating multivessel CAD. CT Surgery consulted for CABG evaluation.

## 2022-03-24 NOTE — PATIENT PROFILE ADULT - FALL HARM RISK - HARM RISK INTERVENTIONS

## 2022-03-24 NOTE — H&P CARDIOLOGY - NSICDXPASTMEDICALHX_GEN_ALL_CORE_FT
PAST MEDICAL HISTORY:  HTN (hypertension)     PAD (peripheral artery disease) Had recent po ABX treatment for Rt foot toe ulcer

## 2022-03-24 NOTE — CONSULT NOTE ADULT - PROBLEM SELECTOR RECOMMENDATION 9
Continue asa 81 daily, ToprolXL 25 daily, atorvastatin 40 HS  Continue pre-op cardiac surgery workup  Check TTE/US Carotids/PFTs  Check P2y12  Vein mapping B/L LE  Tentative plan for CABG Sat 3/26 with Dr. Laguerre

## 2022-03-24 NOTE — H&P CARDIOLOGY - HISTORY OF PRESENT ILLNESS
46 years old male with PMHx of HTN, smoker (1ppd x 20 years, quit 1 month ago), PAD with abnormal VIN in Jan 2020 (pt followed with vascular surgery and LE was recommended) who reports intermittent CP and was evaluated and underwent stress test on 3/9/22.  Stress test showed moderate to severe reversible perfusion in the apical cap, mid inferior wall, basal inferior wall suggestive of ischemia and referred for [facility] for further ischemic evaluation for which he presents today.  No implantable cardiac monitoring device.  Denies CP, dizziness, diaphoresis, palpitations, nausea, vomiting, peripheral edema, recent weight gain, or pre-syncope/syncope.     Card: Roselia Tabor Dr 46 years old male with PMHx of HTN, smoker (1ppd x 20 years, quit 1 month ago), PAD with abnormal VIN in Jan 2020 (pt followed with vascular surgery and LE was recommended) who reports intermittent CP and was evaluated and underwent stress test on 3/9/22.  Stress test showed moderate to severe reversible perfusion in the apical cap, apical inferior, mid inferior wall, basal inferior wall suggestive of ischemia and referred for [facility] for further ischemic evaluation for which he presents today.  No implantable cardiac monitoring device.  Denies CP, dizziness, diaphoresis, palpitations, nausea, vomiting, peripheral edema, recent weight gain, or pre-syncope/syncope.     Card: Roselia Tabor Dr    < from: NM Nuclear Stress Pharmacologic Multiple (03.09.22 @ 11:48) >  IMPRESSION:  Abnormal SPECT Myocardial Perfusion Imaging post rest and   post vasodilator.  Patient developed chest pain during the test. Electrocardiograph was   reported as abnormal: patient developed horizontal and downsloping   inferolateral ST depressions (leads I, II, III, aVf, V4-V6) and ST   elevation in lead aVR.  There is a moderate-sized reversible perfusion defect in the apical cap,   apical inferior, mid inferior and basal inferior walls suggestive of   ischemia. Bowel tracer uptake reduces sensitivity of study       Normal left ventricular wall motion with ejection fraction of 71 %   (normal: 50% or greater).       No regional wall motion abnormalities.  Recommend clinical correlation with the above findings.      Please refer to cardiac stress test report.    Comparison: No prior study available.    --- End of Report ---    < end of copied text >   46 years old male with PMHx of HTN, smoker (1ppd x 20 years, quit 1 month ago), PAD with abnormal VIN in Jan 2020 (pt followed with vascular surgery and LE was recommended) who reports intermittent CP and was evaluated and underwent stress test on 3/9/22.  Stress test showed moderate to severe reversible perfusion in the apical cap, apical inferior, mid inferior wall, basal inferior wall suggestive of ischemia and referred for [facility] for further ischemic evaluation for which he presents today.  No implantable cardiac monitoring device.  Denies CP, dizziness, diaphoresis, palpitations, nausea, vomiting, peripheral edema, recent weight gain, or pre-syncope/syncope.     Card: Roselia Tabor Dr    < from: NM Nuclear Stress Pharmacologic Multiple (03.09.22 @ 11:48) >  IMPRESSION:  Abnormal SPECT Myocardial Perfusion Imaging post rest and   post vasodilator.  Patient developed chest pain during the test. Electrocardiograph was   reported as abnormal: patient developed horizontal and downsloping   inferolateral ST depressions (leads I, II, III, aVf, V4-V6) and ST   elevation in lead aVR.  There is a moderate-sized reversible perfusion defect in the apical cap,   apical inferior, mid inferior and basal inferior walls suggestive of   ischemia. Bowel tracer uptake reduces sensitivity of study       Normal left ventricular wall motion with ejection fraction of 71 %   (normal: 50% or greater).       No regional wall motion abnormalities.  Recommend clinical correlation with the above findings.      Please refer to cardiac stress test report.    Comparison: No prior study available.    --- End of Report ---    < end of copied text >      Summary:   1. Normal global left ventricular systolic function.   2. Left ventricular ejection fraction, by visual estimation, is 65 to   70%.   3. Mildly increased LV wall thickness.   4. Normal left ventricular internal cavity size.   5. Normal right ventricular size and function.   6. The left atrium is normal in size.   7. The right atrium is normal in size.   8. Mild mitral valve regurgitation.   9. Aortic valve leaflet calcification. No aortic valve stenosis.  10. There is no evidence of pericardial effusion.    [id number] Vidal James MD Electronically signed on   3/9/2022 at 2:30:29 PM     46 years old male with strong family Hx of CAD (both parents with CABG at 60's) HTN, smoker (1ppd x 20 years, quit 1 month ago), complained of worsening of leg pain while walking was diagnosed with PAD after abnormal VIN in Jan 2022, recently had to be treated with IV and PO antibiotics for Rt 5th toe non healing ulcer in Jan 2022 (pt followed with vascular dr Chino and LE was recommended).  Patient also endorsed intermittent CP for weeks and was evaluated by Dr Shea and underwent stress test on 3/9/22.  Stress test showed moderate to severe reversible perfusion in the apical cap, apical inferior, mid inferior wall, basal inferior wall suggestive of ischemia and referred for [facility] for further ischemic evaluation for which he presents today.  No implantable cardiac monitoring device.  Currently denies CP, dizziness, palpitations, nausea, vomiting, peripheral edema, recent weight gain, or pre-syncope/syncope.      Card: Roselia Tabor Dr    < from: NM Nuclear Stress Pharmacologic Multiple (03.09.22 @ 11:48) >  IMPRESSION:  Abnormal SPECT Myocardial Perfusion Imaging post rest and   post vasodilator.  Patient developed chest pain during the test. Electrocardiograph was   reported as abnormal: patient developed horizontal and downsloping   inferolateral ST depressions (leads I, II, III, aVf, V4-V6) and ST   elevation in lead aVR.  There is a moderate-sized reversible perfusion defect in the apical cap,   apical inferior, mid inferior and basal inferior walls suggestive of   ischemia. Bowel tracer uptake reduces sensitivity of study       Normal left ventricular wall motion with ejection fraction of 71 %   (normal: 50% or greater).       No regional wall motion abnormalities.  Recommend clinical correlation with the above findings.      Please refer to cardiac stress test report.    Comparison: No prior study available.    --- End of Report ---    < end of copied text >      Summary:   1. Normal global left ventricular systolic function.   2. Left ventricular ejection fraction, by visual estimation, is 65 to   70%.   3. Mildly increased LV wall thickness.   4. Normal left ventricular internal cavity size.   5. Normal right ventricular size and function.   6. The left atrium is normal in size.   7. The right atrium is normal in size.   8. Mild mitral valve regurgitation.   9. Aortic valve leaflet calcification. No aortic valve stenosis.  10. There is no evidence of pericardial effusion.    [id number] Vidal James MD Electronically signed on   3/9/2022 at 2:30:29 PM

## 2022-03-24 NOTE — CONSULT NOTE ADULT - NS ATTEND AMEND GEN_ALL_CORE FT
45 y/o male unstable angina, sever tvd,  EF nL.  For cabg  Targets LAD, OM, poss PL.  STS risk 1-2%.  Risks/benefits d/w pt and daughter, agree to proceed.

## 2022-03-24 NOTE — CONSULT NOTE ADULT - SUBJECTIVE AND OBJECTIVE BOX
History of Present Illness:  46 years old male with strong family Hx of CAD (both parents with CABG at 60's) HTN, smoker (1ppd x 20 years, quit 1 month ago), complained of worsening of leg pain while walking was diagnosed with PAD after abnormal VIN in Jan 2022, recently had to be treated with IV and PO antibiotics for Rt 5th toe non healing ulcer in Jan 2022 (pt followed with vascular dr Chino and LE was recommended).  Patient also endorsed intermittent CP for weeks and was evaluated by Dr Shea and underwent stress test on 3/9/22.  Stress test showed moderate to severe reversible perfusion in the apical cap, apical inferior, mid inferior wall, basal inferior wall suggestive of ischemia and referred for [facility] for further ischemic evaluation for which he presents today.  No implantable cardiac monitoring device.  Currently denies CP, dizziness, palpitations, nausea, vomiting, peripheral edema, recent weight gain, or pre-syncope/syncope.      Card: Roselia Tabor Dr     (24 Mar 2022 10:35)       Past Medical History  HTN (hypertension)    PAD (peripheral artery disease)  Had recent po ABX treatment for Rt foot toe ulcer        Past Surgical History  No significant past surgical history        MEDICATIONS  (STANDING):  aspirin enteric coated 81 milliGRAM(s) Oral daily  atorvastatin 40 milliGRAM(s) Oral at bedtime  hydrochlorothiazide 25 milliGRAM(s) Oral daily  metoprolol succinate ER 25 milliGRAM(s) Oral daily      Vital Signs Last 24 Hrs  T(C): 36.7 (03-24-22 @ 10:32), Max: 36.7 (03-24-22 @ 10:32)  T(F): 98.1 (03-24-22 @ 10:32), Max: 98.1 (03-24-22 @ 10:32)  HR: 73 (03-24-22 @ 16:25) (67 - 81)  BP: 143/82 (03-24-22 @ 16:25) (123/77 - 163/87)  RR: 15 (03-24-22 @ 16:25) (14 - 16)  SpO2: 99% (03-24-22 @ 16:25) (95% - 100%)             Height (cm): 172.7    Weight (kg): 70.3    BMI (kg/m2): 23.6    (24 Mar 2022 10:35)      Allergies: No Known Allergies      SOCIAL HISTORY:  , lives with spouse, wood worker   Smoker: [ ] Yes  [X] No                 Former smoker, quit 1 month ago, 1ppd x 25 years  ETOH use: [ ] Yes  [X] No              Quit 3 years ago, 2-3 drinks daily prior  Ilicit Drug use:  [ ] Yes  [X] No       Pt denies      FAMILY HISTORY:  FH: CAD (coronary artery disease) (Father, Mother)      Review of Systems  GENERAL:  no weakness, fatigue, fevers or chills  NEURO: no dizziness, numbness, tingling or weakness  SKIN: no itching, burning, rashes, or lesions   HEENT: no visual changes;  no headache, no vertigo, no recent colds  RESPIRATORY: no shortness of breath, no cough, sputum, wheezing  CARDIOVASCULAR:  no chest pain,  or palpitations  GI: no abd pain. no N/V/D.  PERIPHERAL VASCULAR: no swelling, no tenderness, no erythema      PHYSICAL EXAM  General: Well nourished, well developed, NAD.                                              Neuro: Normal exam oriented to person/place & time with no focal motor or sensory  deficits.                    Eyes: Normal exam of conjunctiva & lids, pupils equally reactive.   ENT: Normal exam of nasal/oral mucosa with absence of cyanosis.   Neck: Normal exam of jugular veins, trachea & thyroid.   Chest: Normal lung exam with good air movement absence of wheezes, rales, or rhonchi.                                                                         CV:  Auscultation: normal S1S2, RRR   Carotids: No Bruits[X]  Abdominal Aorta: normal [X] nonpalpable[X]                                                                         GI: Normal exam of abdomen with no noted masses or tenderness. +[id number]                                                                                            Extremities: Normal no evidence of cyanosis or deformity, Edema: none  Lower Extremity Pulses: Right[+1DP] Left[+1DP] Varicosities[none]  SKIN : Normal exam to inspection & palpation. +Right radial incision with radial band CDI                                                            LABS:                        14.8   10.14 )-----------( 271      ( 24 Mar 2022 10:46 )             44.1     139  |  98  |  16  ----------------------------<  122<H>  3.7   |  28  |  0.78      < from: TTE Echo Complete w/o Contrast w/ Doppler (03.09.22 @ 12:35) >  Summary:   1. Normal global left ventricular systolic function.   2. Left ventricular ejection fraction, by visual estimation, is 65 to   70%.   3. Mildly increased LV wall thickness.   4. Normal left ventricular internal cavity size.   5. Normal right ventricular size and function.   6. The left atrium is normal in size.   7. The right atrium is normal in size.   8. Mild mitral valve regurgitation.   9. Aortic valve leaflet calcification. No aortic valve stenosis.  10. There is no evidence of pericardial effusion.

## 2022-03-25 ENCOUNTER — APPOINTMENT (OUTPATIENT)
Dept: CARDIOTHORACIC SURGERY | Facility: HOSPITAL | Age: 47
End: 2022-03-25

## 2022-03-25 PROBLEM — I10 ESSENTIAL (PRIMARY) HYPERTENSION: Chronic | Status: ACTIVE | Noted: 2022-03-24

## 2022-03-25 PROBLEM — I73.9 PERIPHERAL VASCULAR DISEASE, UNSPECIFIED: Chronic | Status: ACTIVE | Noted: 2022-03-24

## 2022-03-25 LAB
A1C WITH ESTIMATED AVERAGE GLUCOSE RESULT: 5.6 % — SIGNIFICANT CHANGE UP (ref 4–5.6)
ALBUMIN SERPL ELPH-MCNC: 3.2 G/DL — LOW (ref 3.3–5)
ALP SERPL-CCNC: 43 U/L — SIGNIFICANT CHANGE UP (ref 40–120)
ALT FLD-CCNC: 13 U/L — SIGNIFICANT CHANGE UP (ref 10–45)
ANION GAP SERPL CALC-SCNC: 12 MMOL/L — SIGNIFICANT CHANGE UP (ref 5–17)
ANION GAP SERPL CALC-SCNC: 13 MMOL/L — SIGNIFICANT CHANGE UP (ref 5–17)
APTT BLD: 26.2 SEC — LOW (ref 27.5–35.5)
AST SERPL-CCNC: 17 U/L — SIGNIFICANT CHANGE UP (ref 10–40)
BASOPHILS # BLD AUTO: 0 K/UL — SIGNIFICANT CHANGE UP (ref 0–0.2)
BASOPHILS NFR BLD AUTO: 0 % — SIGNIFICANT CHANGE UP (ref 0–2)
BILIRUB SERPL-MCNC: 0.7 MG/DL — SIGNIFICANT CHANGE UP (ref 0.2–1.2)
BUN SERPL-MCNC: 15 MG/DL — SIGNIFICANT CHANGE UP (ref 7–23)
BUN SERPL-MCNC: 15 MG/DL — SIGNIFICANT CHANGE UP (ref 7–23)
CALCIUM SERPL-MCNC: 7.8 MG/DL — LOW (ref 8.4–10.5)
CALCIUM SERPL-MCNC: 9.7 MG/DL — SIGNIFICANT CHANGE UP (ref 8.4–10.5)
CHLORIDE SERPL-SCNC: 100 MMOL/L — SIGNIFICANT CHANGE UP (ref 96–108)
CHLORIDE SERPL-SCNC: 102 MMOL/L — SIGNIFICANT CHANGE UP (ref 96–108)
CK MB BLD-MCNC: 9.2 % — HIGH (ref 0–3.5)
CK MB CFR SERPL CALC: 16 NG/ML — HIGH (ref 0–6.7)
CK SERPL-CCNC: 174 U/L — SIGNIFICANT CHANGE UP (ref 30–200)
CO2 SERPL-SCNC: 22 MMOL/L — SIGNIFICANT CHANGE UP (ref 22–31)
CO2 SERPL-SCNC: 25 MMOL/L — SIGNIFICANT CHANGE UP (ref 22–31)
CREAT SERPL-MCNC: 0.75 MG/DL — SIGNIFICANT CHANGE UP (ref 0.5–1.3)
CREAT SERPL-MCNC: 0.79 MG/DL — SIGNIFICANT CHANGE UP (ref 0.5–1.3)
EGFR: 110 ML/MIN/1.73M2 — SIGNIFICANT CHANGE UP
EGFR: 111 ML/MIN/1.73M2 — SIGNIFICANT CHANGE UP
EOSINOPHIL # BLD AUTO: 0.28 K/UL — SIGNIFICANT CHANGE UP (ref 0–0.5)
EOSINOPHIL NFR BLD AUTO: 2 % — SIGNIFICANT CHANGE UP (ref 0–6)
ESTIMATED AVERAGE GLUCOSE: 114 MG/DL — SIGNIFICANT CHANGE UP (ref 68–114)
FIBRINOGEN PPP-MCNC: 374 MG/DL — SIGNIFICANT CHANGE UP (ref 330–520)
GAS PNL BLDA: SIGNIFICANT CHANGE UP
GLUCOSE BLDC GLUCOMTR-MCNC: 121 MG/DL — HIGH (ref 70–99)
GLUCOSE BLDC GLUCOMTR-MCNC: 124 MG/DL — HIGH (ref 70–99)
GLUCOSE BLDC GLUCOMTR-MCNC: 129 MG/DL — HIGH (ref 70–99)
GLUCOSE BLDC GLUCOMTR-MCNC: 153 MG/DL — HIGH (ref 70–99)
GLUCOSE BLDC GLUCOMTR-MCNC: 162 MG/DL — HIGH (ref 70–99)
GLUCOSE SERPL-MCNC: 118 MG/DL — HIGH (ref 70–99)
GLUCOSE SERPL-MCNC: 136 MG/DL — HIGH (ref 70–99)
HCT VFR BLD CALC: 29.8 % — LOW (ref 39–50)
HCT VFR BLD CALC: 42.9 % — SIGNIFICANT CHANGE UP (ref 39–50)
HGB BLD-MCNC: 10.2 G/DL — LOW (ref 13–17)
HGB BLD-MCNC: 14.2 G/DL — SIGNIFICANT CHANGE UP (ref 13–17)
INR BLD: 1.31 RATIO — HIGH (ref 0.88–1.16)
LYMPHOCYTES # BLD AUTO: 15 % — SIGNIFICANT CHANGE UP (ref 13–44)
LYMPHOCYTES # BLD AUTO: 2.12 K/UL — SIGNIFICANT CHANGE UP (ref 1–3.3)
MAGNESIUM SERPL-MCNC: 2.4 MG/DL — SIGNIFICANT CHANGE UP (ref 1.6–2.6)
MANUAL SMEAR VERIFICATION: SIGNIFICANT CHANGE UP
MCHC RBC-ENTMCNC: 31 PG — SIGNIFICANT CHANGE UP (ref 27–34)
MCHC RBC-ENTMCNC: 31.1 PG — SIGNIFICANT CHANGE UP (ref 27–34)
MCHC RBC-ENTMCNC: 33.1 GM/DL — SIGNIFICANT CHANGE UP (ref 32–36)
MCHC RBC-ENTMCNC: 34.2 GM/DL — SIGNIFICANT CHANGE UP (ref 32–36)
MCV RBC AUTO: 90.6 FL — SIGNIFICANT CHANGE UP (ref 80–100)
MCV RBC AUTO: 93.9 FL — SIGNIFICANT CHANGE UP (ref 80–100)
MONOCYTES # BLD AUTO: 0.85 K/UL — SIGNIFICANT CHANGE UP (ref 0–0.9)
MONOCYTES NFR BLD AUTO: 6 % — SIGNIFICANT CHANGE UP (ref 2–14)
MRSA PCR RESULT.: SIGNIFICANT CHANGE UP
NEUTROPHILS # BLD AUTO: 10.9 K/UL — HIGH (ref 1.8–7.4)
NEUTROPHILS NFR BLD AUTO: 74 % — SIGNIFICANT CHANGE UP (ref 43–77)
NEUTS BAND # BLD: 3 % — SIGNIFICANT CHANGE UP (ref 0–8)
NRBC # BLD: 0 /100 WBCS — SIGNIFICANT CHANGE UP (ref 0–0)
NRBC # BLD: 0 /100 — SIGNIFICANT CHANGE UP (ref 0–0)
PHOSPHATE SERPL-MCNC: 3.6 MG/DL — SIGNIFICANT CHANGE UP (ref 2.5–4.5)
PLAT MORPH BLD: NORMAL — SIGNIFICANT CHANGE UP
PLATELET # BLD AUTO: 149 K/UL — LOW (ref 150–400)
PLATELET # BLD AUTO: 230 K/UL — SIGNIFICANT CHANGE UP (ref 150–400)
POTASSIUM SERPL-MCNC: 3.8 MMOL/L — SIGNIFICANT CHANGE UP (ref 3.5–5.3)
POTASSIUM SERPL-MCNC: 4.1 MMOL/L — SIGNIFICANT CHANGE UP (ref 3.5–5.3)
POTASSIUM SERPL-SCNC: 3.8 MMOL/L — SIGNIFICANT CHANGE UP (ref 3.5–5.3)
POTASSIUM SERPL-SCNC: 4.1 MMOL/L — SIGNIFICANT CHANGE UP (ref 3.5–5.3)
PROT SERPL-MCNC: 4.5 G/DL — LOW (ref 6–8.3)
PROTHROM AB SERPL-ACNC: 15.2 SEC — HIGH (ref 10.5–13.4)
RBC # BLD: 3.29 M/UL — LOW (ref 4.2–5.8)
RBC # BLD: 4.57 M/UL — SIGNIFICANT CHANGE UP (ref 4.2–5.8)
RBC # FLD: 12.3 % — SIGNIFICANT CHANGE UP (ref 10.3–14.5)
RBC # FLD: 12.9 % — SIGNIFICANT CHANGE UP (ref 10.3–14.5)
RBC BLD AUTO: NORMAL — SIGNIFICANT CHANGE UP
S AUREUS DNA NOSE QL NAA+PROBE: SIGNIFICANT CHANGE UP
SARS-COV-2 RNA SPEC QL NAA+PROBE: SIGNIFICANT CHANGE UP
SARS-COV-2 RNA SPEC QL NAA+PROBE: SIGNIFICANT CHANGE UP
SODIUM SERPL-SCNC: 137 MMOL/L — SIGNIFICANT CHANGE UP (ref 135–145)
SODIUM SERPL-SCNC: 137 MMOL/L — SIGNIFICANT CHANGE UP (ref 135–145)
T3 SERPL-MCNC: 106 NG/DL — SIGNIFICANT CHANGE UP (ref 80–200)
T4 AB SER-ACNC: 7.4 UG/DL — SIGNIFICANT CHANGE UP (ref 4.6–12)
TROPONIN T, HIGH SENSITIVITY RESULT: 265 NG/L — HIGH (ref 0–51)
TSH SERPL-MCNC: 2.11 UIU/ML — SIGNIFICANT CHANGE UP (ref 0.27–4.2)
WBC # BLD: 14.16 K/UL — HIGH (ref 3.8–10.5)
WBC # BLD: 8.77 K/UL — SIGNIFICANT CHANGE UP (ref 3.8–10.5)
WBC # FLD AUTO: 14.16 K/UL — HIGH (ref 3.8–10.5)
WBC # FLD AUTO: 8.77 K/UL — SIGNIFICANT CHANGE UP (ref 3.8–10.5)

## 2022-03-25 PROCEDURE — 93010 ELECTROCARDIOGRAM REPORT: CPT

## 2022-03-25 PROCEDURE — 35600 OPEN HRV UXTR ART 1 SGM CAB: CPT

## 2022-03-25 PROCEDURE — 71045 X-RAY EXAM CHEST 1 VIEW: CPT | Mod: 26,77

## 2022-03-25 PROCEDURE — 33534 CABG ARTERIAL TWO: CPT

## 2022-03-25 PROCEDURE — 71045 X-RAY EXAM CHEST 1 VIEW: CPT | Mod: 26

## 2022-03-25 PROCEDURE — 99291 CRITICAL CARE FIRST HOUR: CPT

## 2022-03-25 DEVICE — LIGATING CLIPS WECK HORIZON MEDIUM (BLUE) 24: Type: IMPLANTABLE DEVICE | Status: FUNCTIONAL

## 2022-03-25 DEVICE — CANNULA AORTIC PERFUSION EZ GLIDE STRAIGHT 21FR X 35CM VENTED: Type: IMPLANTABLE DEVICE | Status: FUNCTIONAL

## 2022-03-25 DEVICE — LIGATING CLIPS WECK HORIZON SMALL-WIDE (RED) 24: Type: IMPLANTABLE DEVICE | Status: FUNCTIONAL

## 2022-03-25 DEVICE — SHUNT FLO-THRU INTRALUMINAL 1MM X 18MM: Type: IMPLANTABLE DEVICE | Status: FUNCTIONAL

## 2022-03-25 DEVICE — CANNULA VENOUS 2 STAGE THIN FLEX 36/46FR X 1/2" WITH RETURN DIAL: Type: IMPLANTABLE DEVICE | Status: FUNCTIONAL

## 2022-03-25 DEVICE — MEDIASTINAL CATH DRAIN 9MM: Type: IMPLANTABLE DEVICE | Status: FUNCTIONAL

## 2022-03-25 DEVICE — KIT A-LINE 1LUM 20G X 12CM SAFE KIT: Type: IMPLANTABLE DEVICE | Status: FUNCTIONAL

## 2022-03-25 DEVICE — CANNULA VESSEL 3MM BLUNT TIP CLEAR 1-WAY VALVE: Type: IMPLANTABLE DEVICE | Status: FUNCTIONAL

## 2022-03-25 DEVICE — CHEST DRAIN PLEUR-EVAC 28FR STRAIGHT: Type: IMPLANTABLE DEVICE | Status: FUNCTIONAL

## 2022-03-25 DEVICE — CANNULA AORTIC ROOT WITH VENT LINE 12G X 14CM FLANGED: Type: IMPLANTABLE DEVICE | Status: FUNCTIONAL

## 2022-03-25 DEVICE — PACING WIRE WHITE M-24 BAREWIRE 37MM X 89MM: Type: IMPLANTABLE DEVICE | Status: FUNCTIONAL

## 2022-03-25 DEVICE — CLIP APPLIER COVIDIEN SURGICLIP III 9" SM: Type: IMPLANTABLE DEVICE | Status: FUNCTIONAL

## 2022-03-25 DEVICE — KIT CVC 2LUM MAC 9FR CHG: Type: IMPLANTABLE DEVICE | Status: FUNCTIONAL

## 2022-03-25 DEVICE — SURGICEL FIBRILLAR 2 X 4": Type: IMPLANTABLE DEVICE | Status: FUNCTIONAL

## 2022-03-25 DEVICE — CANNULA IMA 1MM BLUNT TIP: Type: IMPLANTABLE DEVICE | Status: FUNCTIONAL

## 2022-03-25 RX ORDER — CHLORHEXIDINE GLUCONATE 213 G/1000ML
1 SOLUTION TOPICAL DAILY
Refills: 0 | Status: DISCONTINUED | OUTPATIENT
Start: 2022-03-25 | End: 2022-03-25

## 2022-03-25 RX ORDER — AMIODARONE HYDROCHLORIDE 400 MG/1
400 TABLET ORAL
Refills: 0 | Status: DISCONTINUED | OUTPATIENT
Start: 2022-03-25 | End: 2022-03-25

## 2022-03-25 RX ORDER — DEXTROSE 50 % IN WATER 50 %
25 SYRINGE (ML) INTRAVENOUS
Refills: 0 | Status: DISCONTINUED | OUTPATIENT
Start: 2022-03-25 | End: 2022-03-29

## 2022-03-25 RX ORDER — ALBUMIN HUMAN 25 %
250 VIAL (ML) INTRAVENOUS
Refills: 0 | Status: COMPLETED | OUTPATIENT
Start: 2022-03-25 | End: 2022-03-25

## 2022-03-25 RX ORDER — ASCORBIC ACID 60 MG
500 TABLET,CHEWABLE ORAL
Refills: 0 | Status: DISCONTINUED | OUTPATIENT
Start: 2022-03-25 | End: 2022-03-29

## 2022-03-25 RX ORDER — HYDROMORPHONE HYDROCHLORIDE 2 MG/ML
0.5 INJECTION INTRAMUSCULAR; INTRAVENOUS; SUBCUTANEOUS EVERY 6 HOURS
Refills: 0 | Status: DISCONTINUED | OUTPATIENT
Start: 2022-03-25 | End: 2022-03-26

## 2022-03-25 RX ORDER — INSULIN HUMAN 100 [IU]/ML
3 INJECTION, SOLUTION SUBCUTANEOUS
Qty: 100 | Refills: 0 | Status: DISCONTINUED | OUTPATIENT
Start: 2022-03-25 | End: 2022-03-26

## 2022-03-25 RX ORDER — CEFUROXIME AXETIL 250 MG
1500 TABLET ORAL ONCE
Refills: 0 | Status: DISCONTINUED | OUTPATIENT
Start: 2022-03-25 | End: 2022-03-25

## 2022-03-25 RX ORDER — ASPIRIN/CALCIUM CARB/MAGNESIUM 324 MG
300 TABLET ORAL ONCE
Refills: 0 | Status: DISCONTINUED | OUTPATIENT
Start: 2022-03-25 | End: 2022-03-26

## 2022-03-25 RX ORDER — PANTOPRAZOLE SODIUM 20 MG/1
40 TABLET, DELAYED RELEASE ORAL DAILY
Refills: 0 | Status: DISCONTINUED | OUTPATIENT
Start: 2022-03-25 | End: 2022-03-26

## 2022-03-25 RX ORDER — ASPIRIN/CALCIUM CARB/MAGNESIUM 324 MG
81 TABLET ORAL DAILY
Refills: 0 | Status: DISCONTINUED | OUTPATIENT
Start: 2022-03-25 | End: 2022-03-29

## 2022-03-25 RX ORDER — SODIUM CHLORIDE 9 MG/ML
1000 INJECTION INTRAMUSCULAR; INTRAVENOUS; SUBCUTANEOUS
Refills: 0 | Status: DISCONTINUED | OUTPATIENT
Start: 2022-03-25 | End: 2022-03-27

## 2022-03-25 RX ORDER — ACETAMINOPHEN 500 MG
650 TABLET ORAL EVERY 6 HOURS
Refills: 0 | Status: DISCONTINUED | OUTPATIENT
Start: 2022-03-28 | End: 2022-03-29

## 2022-03-25 RX ORDER — SODIUM CHLORIDE 9 MG/ML
500 INJECTION, SOLUTION INTRAVENOUS
Refills: 0 | Status: COMPLETED | OUTPATIENT
Start: 2022-03-25 | End: 2022-03-25

## 2022-03-25 RX ORDER — GABAPENTIN 400 MG/1
100 CAPSULE ORAL EVERY 8 HOURS
Refills: 0 | Status: DISCONTINUED | OUTPATIENT
Start: 2022-03-25 | End: 2022-03-29

## 2022-03-25 RX ORDER — POTASSIUM CHLORIDE 20 MEQ
10 PACKET (EA) ORAL
Refills: 0 | Status: COMPLETED | OUTPATIENT
Start: 2022-03-25 | End: 2022-03-25

## 2022-03-25 RX ORDER — CEFUROXIME AXETIL 250 MG
1500 TABLET ORAL EVERY 8 HOURS
Refills: 0 | Status: COMPLETED | OUTPATIENT
Start: 2022-03-25 | End: 2022-03-27

## 2022-03-25 RX ORDER — CHLORHEXIDINE GLUCONATE 213 G/1000ML
1 SOLUTION TOPICAL DAILY
Refills: 0 | Status: DISCONTINUED | OUTPATIENT
Start: 2022-03-25 | End: 2022-03-27

## 2022-03-25 RX ORDER — OXYCODONE HYDROCHLORIDE 5 MG/1
10 TABLET ORAL EVERY 4 HOURS
Refills: 0 | Status: DISCONTINUED | OUTPATIENT
Start: 2022-03-25 | End: 2022-03-29

## 2022-03-25 RX ORDER — DEXTROSE 50 % IN WATER 50 %
50 SYRINGE (ML) INTRAVENOUS
Refills: 0 | Status: DISCONTINUED | OUTPATIENT
Start: 2022-03-25 | End: 2022-03-29

## 2022-03-25 RX ORDER — MUPIROCIN 20 MG/G
1 OINTMENT TOPICAL
Refills: 0 | Status: DISCONTINUED | OUTPATIENT
Start: 2022-03-25 | End: 2022-03-25

## 2022-03-25 RX ORDER — OXYCODONE HYDROCHLORIDE 5 MG/1
5 TABLET ORAL EVERY 4 HOURS
Refills: 0 | Status: DISCONTINUED | OUTPATIENT
Start: 2022-03-25 | End: 2022-03-29

## 2022-03-25 RX ORDER — POTASSIUM CHLORIDE 20 MEQ
10 PACKET (EA) ORAL
Refills: 0 | Status: DISCONTINUED | OUTPATIENT
Start: 2022-03-25 | End: 2022-03-29

## 2022-03-25 RX ORDER — INFLUENZA VIRUS VACCINE 15; 15; 15; 15 UG/.5ML; UG/.5ML; UG/.5ML; UG/.5ML
0.5 SUSPENSION INTRAMUSCULAR ONCE
Refills: 0 | Status: DISCONTINUED | OUTPATIENT
Start: 2022-03-25 | End: 2022-03-25

## 2022-03-25 RX ORDER — POLYETHYLENE GLYCOL 3350 17 G/17G
17 POWDER, FOR SOLUTION ORAL DAILY
Refills: 0 | Status: DISCONTINUED | OUTPATIENT
Start: 2022-03-26 | End: 2022-03-29

## 2022-03-25 RX ORDER — NOREPINEPHRINE BITARTRATE/D5W 8 MG/250ML
0.02 PLASTIC BAG, INJECTION (ML) INTRAVENOUS
Qty: 8 | Refills: 0 | Status: DISCONTINUED | OUTPATIENT
Start: 2022-03-25 | End: 2022-03-26

## 2022-03-25 RX ORDER — SODIUM CHLORIDE 9 MG/ML
500 INJECTION, SOLUTION INTRAVENOUS ONCE
Refills: 0 | Status: COMPLETED | OUTPATIENT
Start: 2022-03-25 | End: 2022-03-25

## 2022-03-25 RX ORDER — CHLORHEXIDINE GLUCONATE 213 G/1000ML
15 SOLUTION TOPICAL EVERY 12 HOURS
Refills: 0 | Status: DISCONTINUED | OUTPATIENT
Start: 2022-03-25 | End: 2022-03-25

## 2022-03-25 RX ORDER — SENNA PLUS 8.6 MG/1
2 TABLET ORAL AT BEDTIME
Refills: 0 | Status: DISCONTINUED | OUTPATIENT
Start: 2022-03-26 | End: 2022-03-29

## 2022-03-25 RX ORDER — ACETAMINOPHEN 500 MG
650 TABLET ORAL EVERY 6 HOURS
Refills: 0 | Status: COMPLETED | OUTPATIENT
Start: 2022-03-25 | End: 2022-03-28

## 2022-03-25 RX ADMIN — Medication 81 MILLIGRAM(S): at 23:17

## 2022-03-25 RX ADMIN — HYDROMORPHONE HYDROCHLORIDE 0.5 MILLIGRAM(S): 2 INJECTION INTRAMUSCULAR; INTRAVENOUS; SUBCUTANEOUS at 18:35

## 2022-03-25 RX ADMIN — SODIUM CHLORIDE 3000 MILLILITER(S): 9 INJECTION, SOLUTION INTRAVENOUS at 19:15

## 2022-03-25 RX ADMIN — Medication 125 MILLILITER(S): at 20:15

## 2022-03-25 RX ADMIN — Medication 25 MILLIGRAM(S): at 05:02

## 2022-03-25 RX ADMIN — SODIUM CHLORIDE 500 MILLILITER(S): 9 INJECTION, SOLUTION INTRAVENOUS at 20:00

## 2022-03-25 RX ADMIN — AMLODIPINE BESYLATE 5 MILLIGRAM(S): 2.5 TABLET ORAL at 05:02

## 2022-03-25 RX ADMIN — Medication 100 MILLIGRAM(S): at 20:43

## 2022-03-25 RX ADMIN — Medication 125 MILLILITER(S): at 21:48

## 2022-03-25 RX ADMIN — Medication 125 MILLILITER(S): at 21:46

## 2022-03-25 RX ADMIN — Medication 650 MILLIGRAM(S): at 23:17

## 2022-03-25 RX ADMIN — Medication 100 MILLIEQUIVALENT(S): at 18:33

## 2022-03-25 RX ADMIN — MUPIROCIN 1 APPLICATION(S): 20 OINTMENT TOPICAL at 11:21

## 2022-03-25 RX ADMIN — Medication 125 MILLILITER(S): at 20:16

## 2022-03-25 RX ADMIN — HYDROMORPHONE HYDROCHLORIDE 0.5 MILLIGRAM(S): 2 INJECTION INTRAMUSCULAR; INTRAVENOUS; SUBCUTANEOUS at 19:00

## 2022-03-25 RX ADMIN — SODIUM CHLORIDE 500 MILLILITER(S): 9 INJECTION, SOLUTION INTRAVENOUS at 21:00

## 2022-03-25 RX ADMIN — Medication 100 MILLIEQUIVALENT(S): at 21:48

## 2022-03-25 RX ADMIN — Medication 650 MILLIGRAM(S): at 23:56

## 2022-03-25 RX ADMIN — Medication 100 MILLIEQUIVALENT(S): at 21:33

## 2022-03-25 RX ADMIN — INSULIN HUMAN 3 UNIT(S)/HR: 100 INJECTION, SOLUTION SUBCUTANEOUS at 20:16

## 2022-03-25 RX ADMIN — Medication 25 MILLIGRAM(S): at 05:01

## 2022-03-25 RX ADMIN — Medication 100 MILLIEQUIVALENT(S): at 19:14

## 2022-03-25 RX ADMIN — GABAPENTIN 100 MILLIGRAM(S): 400 CAPSULE ORAL at 23:17

## 2022-03-25 NOTE — PROGRESS NOTE ADULT - SUBJECTIVE AND OBJECTIVE BOX
Patient seen and examined at the bedside.    Remained critically ill on continuous ICU monitoring.    OBJECTIVE:  Vital Signs Last 24 Hrs  T(C): 36.7 (25 Mar 2022 11:28), Max: 37.2 (24 Mar 2022 19:51)  T(F): 98 (25 Mar 2022 11:28), Max: 98.9 (24 Mar 2022 19:51)  HR: 61 (25 Mar 2022 18:15) (57 - 70)  BP: 132/77 (25 Mar 2022 11:28) (122/73 - 143/74)  BP(mean): --  RR: 12 (25 Mar 2022 18:15) (12 - 18)  SpO2: 100% (25 Mar 2022 18:15) (97% - 100%)    REVIEW OF SYSTEMS:  [x ] N/A  Physical Exam:  General: intubated multiple lines gtt & tubes   Neurology: nonfocal  Eyes: bilaeral pupils equal and reactive   ENT/Neck: +ETT midline, Neck supple, trachea midline, No JVD   Respiratory: Rales noted bilaterally   CV: RRR, S1S2, no murmurs        [] Sternal dressing, [] Mediastinal CT, [] Pleural CT, [] DARIEL drain        [] Sinus rhythm, [] Afib, [] Temporary pacing, [] PPM  Abdominal: Soft, NT, ND +BS,   Extremities: 1-2+ pedal edema noted, + peripheral pulses   Skin: No Rashes, Hematoma, Ecchymosis                           Assessment:  CAD s/p C2L POD#0  Lactate acidosis  Smoker / HTN/ Hyperglycemia     Plan:   ***Neuro***   [x] Nonfocal  Post operative neuro assessment     ***Cardiovascular***  Invasive hemodynamic monitoring, assess perfusion indices   SR / CVP 4/  Hct 31%/ Lactate 2.1  [x] Levophed 2.64 mL/Hr  Volume: [] Albumin __ [] Crystalloid __ [] PRBC __ [] Plts __ [] Cryo __ [] Plasma __ [] FEIBA __  Reassessment of hemodynamics post resuscitation   Monitor chest tube outputs   [] Bleeding ___ / [] Nonbleeding ___   Serial EKG and cardiac enzymes     ***Pulmonary***  Post op vent management  Titration of FiO2 and PEEP, follow SpO2, CXR, blood gases   Will plan to wean & extubate once pt is hemodynamically stable and not bleeding.     ***GI***  Tolerating an NPO diet, will advance as tolerated  [x] Protonix      ***Renal***    Continue to monitor I/Os, BUN/Creatinine.   Replete lytes PRN  Figueredo present [x ] positive     ***ID***  Perioperative coverage with Cefuroxime     ***Endocrine***  [x] Hyperglycemia : HbA1c 5.6%               - [x] Insulin gtt              - Need tight glycemic control to prevent wound infection.        Patient requires continuous monitoring with bedside rhythm monitoring, pulse oximetry monitoring, and continuous central venous and arterial pressure monitoring; and intermittent blood gas analysis. Care plan discussed with the ICU care team.   Patient remained critical, at risk for life threatening decompensation.    I have spent 30 minutes providing critical care management to this patient.    By signing my name below, I, Fawn Taylor, attest that this documentation has been prepared under the direction and in the presence of Carlos Caro MD   Electronically signed: Deirdre Kramer, 03-25-22 @ 18:30    I, Carlos Caro, personally performed the services described in this documentation. all medical record entries made by the scribe were at my direction and in my presence. I have reviewed the chart and agree that the record reflects my personal performance and is accurate and complete  Electronically signed: Carlos Caro MD  Patient seen and examined at the bedside.    Remained critically ill on continuous ICU monitoring.    OBJECTIVE:  Vital Signs Last 24 Hrs  T(C): 36.7 (25 Mar 2022 11:28), Max: 37.2 (24 Mar 2022 19:51)  T(F): 98 (25 Mar 2022 11:28), Max: 98.9 (24 Mar 2022 19:51)  HR: 61 (25 Mar 2022 18:15) (57 - 70)  BP: 132/77 (25 Mar 2022 11:28) (122/73 - 143/74)  BP(mean): --  RR: 12 (25 Mar 2022 18:15) (12 - 18)  SpO2: 100% (25 Mar 2022 18:15) (97% - 100%)    REVIEW OF SYSTEMS:  [x ] N/A  Physical Exam:  General: intubated multiple lines gtt & tubes   Neurology: nonfocal  Eyes: bilaeral pupils equal and reactive   ENT/Neck: +ETT midline, Neck supple, trachea midline, No JVD   Respiratory: Rales noted bilaterally   CV: RRR, S1S2, no murmurs        [x] Sternal dressing, [x] Mediastinal CT, [x] Pleural CT        [x] Sinus rhythm, , [x] Temporary pacing VVI 45/10  Abdominal: Soft, NT, ND +BS,   Extremities: 1-2+ pedal edema noted, + peripheral pulses   Skin: No Rashes, Hematoma, Ecchymosis                           Assessment:    46 yr male H/O Smoking / HTN / HLD / PVD   S/P Cath multivessel CAD   S/P CABG X2 POD # 0  Post op hypotension related to hypovolemia   Increase Lactic acid levels   Hyperglycemia     Plan:   ***Neuro***   [x] Nonfocal  Post operative neuro assessment     ***Cardiovascular***  Invasive hemodynamic monitoring, assess perfusion indices   SR 80-90 / CVP 4 /  Hct 29 %/ Lactate 2.1  [x] Levophed 0.02 mcg/kg/min to maintain MAP >65   Volume: [x] Albumin 500 [x] Crystalloid  1000 cc   Reassessment of hemodynamics post resuscitation   Monitor chest tube outputs   [x] Nonbleeding  ASA / Statins   Serial EKG and cardiac enzymes     ***Pulmonary***  Post op vent management  H/O smoking but no COPD  Titration of FiO2 and PEEP, follow SpO2, CXR, blood gases   Add Bronchodilators as needed   Will plan to wean & extubate once pt is hemodynamically stable and not bleeding.     ***GI***  Tolerating an NPO diet, will advance as tolerated  [x] Protonix      ***Renal***    Continue to monitor I/Os, BUN/Creatinine.   Replete lytes PRN  Figueredo present [x ] positive     ***ID***  Perioperative coverage with Cefuroxime     ***Endocrine***  [x] Hyperglycemia : HbA1c 5.6%               - [x] Insulin gtt              - Need tight glycemic control to prevent wound infection.        Patient requires continuous monitoring with bedside rhythm monitoring, pulse oximetry monitoring, and continuous central venous and arterial pressure monitoring; and intermittent blood gas analysis. Care plan discussed with the ICU care team.   Patient remained critical, at risk for life threatening decompensation.    I have spent 30 minutes providing critical care management to this patient.    By signing my name below, I, Fawn Taylor, attest that this documentation has been prepared under the direction and in the presence of Carlos Caro MD   Electronically signed: Deirdre Kramer, 03-25-22 @ 18:30    I, Carlos Caro, personally performed the services described in this documentation. all medical record entries made by the scribe were at my direction and in my presence. I have reviewed the chart and agree that the record reflects my personal performance and is accurate and complete  Electronically signed: Carlos Caro MD

## 2022-03-25 NOTE — BRIEF OPERATIVE NOTE - NSICDXBRIEFPOSTOP_GEN_ALL_CORE_FT
POST-OP DIAGNOSIS:  Coronary artery disease with stable angina pectoris 25-Mar-2022 18:27:27  Reginaldo Roman

## 2022-03-25 NOTE — PRE-ANESTHESIA EVALUATION ADULT - NSANTHOSAYNRD_GEN_A_CORE
No. ALISIA screening performed.  STOP BANG Legend: 0-2 = LOW Risk; 3-4 = INTERMEDIATE Risk; 5-8 = HIGH Risk

## 2022-03-25 NOTE — BRIEF OPERATIVE NOTE - OPERATION/FINDINGS
h/o CAD w/TVD now s/p CABG x2 [LIMA-LAD, L Rad = OM]        ***normal left ventricular function pre- and postoperatively  ***normal right ventricular function pre- and postoperatively  ***To ICU on Levo .02, Precedex

## 2022-03-25 NOTE — BRIEF OPERATIVE NOTE - NSICDXBRIEFPREOP_GEN_ALL_CORE_FT
PRE-OP DIAGNOSIS:  Coronary artery disease with stable angina pectoris 25-Mar-2022 18:27:24  Reginaldo Roman

## 2022-03-25 NOTE — BRIEF OPERATIVE NOTE - NSICDXBRIEFPROCEDURE_GEN_ALL_CORE_FT
PROCEDURES:  Bypass graft, coronary artery, 2 arterial grafts 25-Mar-2022 18:27:10  Reginaldo Roman

## 2022-03-25 NOTE — AIRWAY REMOVAL NOTE  ADULT & PEDS - ARTIFICAL AIRWAY REMOVAL COMMENTS
Written order for extubation verified. The patient was identified by full name and birth date compared to the identification band.  Present during the procedure was ERIC Vogel.

## 2022-03-26 LAB
ALBUMIN SERPL ELPH-MCNC: 4.6 G/DL — SIGNIFICANT CHANGE UP (ref 3.3–5)
ALP SERPL-CCNC: 40 U/L — SIGNIFICANT CHANGE UP (ref 40–120)
ALT FLD-CCNC: 15 U/L — SIGNIFICANT CHANGE UP (ref 10–45)
ANION GAP SERPL CALC-SCNC: 12 MMOL/L — SIGNIFICANT CHANGE UP (ref 5–17)
APTT BLD: 24.2 SEC — LOW (ref 27.5–35.5)
AST SERPL-CCNC: 22 U/L — SIGNIFICANT CHANGE UP (ref 10–40)
BILIRUB SERPL-MCNC: 0.6 MG/DL — SIGNIFICANT CHANGE UP (ref 0.2–1.2)
BUN SERPL-MCNC: 14 MG/DL — SIGNIFICANT CHANGE UP (ref 7–23)
CALCIUM SERPL-MCNC: 8.6 MG/DL — SIGNIFICANT CHANGE UP (ref 8.4–10.5)
CHLORIDE SERPL-SCNC: 105 MMOL/L — SIGNIFICANT CHANGE UP (ref 96–108)
CK MB BLD-MCNC: 6 % — HIGH (ref 0–3.5)
CK MB CFR SERPL CALC: 17.3 NG/ML — HIGH (ref 0–6.7)
CK SERPL-CCNC: 287 U/L — HIGH (ref 30–200)
CO2 SERPL-SCNC: 23 MMOL/L — SIGNIFICANT CHANGE UP (ref 22–31)
CREAT SERPL-MCNC: 0.71 MG/DL — SIGNIFICANT CHANGE UP (ref 0.5–1.3)
EGFR: 113 ML/MIN/1.73M2 — SIGNIFICANT CHANGE UP
GAS PNL BLDA: SIGNIFICANT CHANGE UP
GLUCOSE BLDC GLUCOMTR-MCNC: 111 MG/DL — HIGH (ref 70–99)
GLUCOSE BLDC GLUCOMTR-MCNC: 114 MG/DL — HIGH (ref 70–99)
GLUCOSE BLDC GLUCOMTR-MCNC: 114 MG/DL — HIGH (ref 70–99)
GLUCOSE BLDC GLUCOMTR-MCNC: 116 MG/DL — HIGH (ref 70–99)
GLUCOSE BLDC GLUCOMTR-MCNC: 132 MG/DL — HIGH (ref 70–99)
GLUCOSE BLDC GLUCOMTR-MCNC: 137 MG/DL — HIGH (ref 70–99)
GLUCOSE BLDC GLUCOMTR-MCNC: 145 MG/DL — HIGH (ref 70–99)
GLUCOSE BLDC GLUCOMTR-MCNC: 159 MG/DL — HIGH (ref 70–99)
GLUCOSE SERPL-MCNC: 103 MG/DL — HIGH (ref 70–99)
HCT VFR BLD CALC: 28.9 % — LOW (ref 39–50)
HGB BLD-MCNC: 9.7 G/DL — LOW (ref 13–17)
INR BLD: 1.19 RATIO — HIGH (ref 0.88–1.16)
MAGNESIUM SERPL-MCNC: 2.3 MG/DL — SIGNIFICANT CHANGE UP (ref 1.6–2.6)
MCHC RBC-ENTMCNC: 31 PG — SIGNIFICANT CHANGE UP (ref 27–34)
MCHC RBC-ENTMCNC: 33.6 GM/DL — SIGNIFICANT CHANGE UP (ref 32–36)
MCV RBC AUTO: 92.3 FL — SIGNIFICANT CHANGE UP (ref 80–100)
NRBC # BLD: 0 /100 WBCS — SIGNIFICANT CHANGE UP (ref 0–0)
PHOSPHATE SERPL-MCNC: 2.6 MG/DL — SIGNIFICANT CHANGE UP (ref 2.5–4.5)
PLATELET # BLD AUTO: 146 K/UL — LOW (ref 150–400)
POTASSIUM SERPL-MCNC: 3.8 MMOL/L — SIGNIFICANT CHANGE UP (ref 3.5–5.3)
POTASSIUM SERPL-SCNC: 3.8 MMOL/L — SIGNIFICANT CHANGE UP (ref 3.5–5.3)
PROT SERPL-MCNC: 6.1 G/DL — SIGNIFICANT CHANGE UP (ref 6–8.3)
PROTHROM AB SERPL-ACNC: 13.8 SEC — HIGH (ref 10.5–13.4)
RBC # BLD: 3.13 M/UL — LOW (ref 4.2–5.8)
RBC # FLD: 12.4 % — SIGNIFICANT CHANGE UP (ref 10.3–14.5)
SODIUM SERPL-SCNC: 140 MMOL/L — SIGNIFICANT CHANGE UP (ref 135–145)
TROPONIN T, HIGH SENSITIVITY RESULT: 309 NG/L — HIGH (ref 0–51)
WBC # BLD: 9.52 K/UL — SIGNIFICANT CHANGE UP (ref 3.8–10.5)
WBC # FLD AUTO: 9.52 K/UL — SIGNIFICANT CHANGE UP (ref 3.8–10.5)

## 2022-03-26 PROCEDURE — 93010 ELECTROCARDIOGRAM REPORT: CPT

## 2022-03-26 PROCEDURE — 71045 X-RAY EXAM CHEST 1 VIEW: CPT | Mod: 26

## 2022-03-26 PROCEDURE — 99291 CRITICAL CARE FIRST HOUR: CPT

## 2022-03-26 RX ORDER — INSULIN LISPRO 100/ML
VIAL (ML) SUBCUTANEOUS AT BEDTIME
Refills: 0 | Status: DISCONTINUED | OUTPATIENT
Start: 2022-03-26 | End: 2022-03-29

## 2022-03-26 RX ORDER — ATORVASTATIN CALCIUM 80 MG/1
80 TABLET, FILM COATED ORAL AT BEDTIME
Refills: 0 | Status: DISCONTINUED | OUTPATIENT
Start: 2022-03-26 | End: 2022-03-29

## 2022-03-26 RX ORDER — ENOXAPARIN SODIUM 100 MG/ML
40 INJECTION SUBCUTANEOUS EVERY 24 HOURS
Refills: 0 | Status: DISCONTINUED | OUTPATIENT
Start: 2022-03-26 | End: 2022-03-29

## 2022-03-26 RX ORDER — METOPROLOL TARTRATE 50 MG
25 TABLET ORAL EVERY 12 HOURS
Refills: 0 | Status: DISCONTINUED | OUTPATIENT
Start: 2022-03-26 | End: 2022-03-27

## 2022-03-26 RX ORDER — POTASSIUM CHLORIDE 20 MEQ
10 PACKET (EA) ORAL
Refills: 0 | Status: COMPLETED | OUTPATIENT
Start: 2022-03-26 | End: 2022-03-26

## 2022-03-26 RX ORDER — CLOPIDOGREL BISULFATE 75 MG/1
75 TABLET, FILM COATED ORAL DAILY
Refills: 0 | Status: DISCONTINUED | OUTPATIENT
Start: 2022-03-26 | End: 2022-03-28

## 2022-03-26 RX ORDER — AMLODIPINE BESYLATE 2.5 MG/1
5 TABLET ORAL DAILY
Refills: 0 | Status: DISCONTINUED | OUTPATIENT
Start: 2022-03-26 | End: 2022-03-29

## 2022-03-26 RX ORDER — INSULIN LISPRO 100/ML
2 VIAL (ML) SUBCUTANEOUS ONCE
Refills: 0 | Status: COMPLETED | OUTPATIENT
Start: 2022-03-26 | End: 2022-03-26

## 2022-03-26 RX ORDER — INSULIN LISPRO 100/ML
VIAL (ML) SUBCUTANEOUS
Refills: 0 | Status: DISCONTINUED | OUTPATIENT
Start: 2022-03-26 | End: 2022-03-29

## 2022-03-26 RX ORDER — PANTOPRAZOLE SODIUM 20 MG/1
40 TABLET, DELAYED RELEASE ORAL
Refills: 0 | Status: DISCONTINUED | OUTPATIENT
Start: 2022-03-26 | End: 2022-03-29

## 2022-03-26 RX ORDER — METOPROLOL TARTRATE 50 MG
12.5 TABLET ORAL
Refills: 0 | Status: DISCONTINUED | OUTPATIENT
Start: 2022-03-26 | End: 2022-03-26

## 2022-03-26 RX ORDER — SODIUM CHLORIDE 9 MG/ML
3 INJECTION INTRAMUSCULAR; INTRAVENOUS; SUBCUTANEOUS EVERY 8 HOURS
Refills: 0 | Status: DISCONTINUED | OUTPATIENT
Start: 2022-03-27 | End: 2022-03-29

## 2022-03-26 RX ORDER — METOPROLOL TARTRATE 50 MG
12.5 TABLET ORAL ONCE
Refills: 0 | Status: COMPLETED | OUTPATIENT
Start: 2022-03-26 | End: 2022-03-26

## 2022-03-26 RX ADMIN — Medication 650 MILLIGRAM(S): at 17:20

## 2022-03-26 RX ADMIN — Medication 81 MILLIGRAM(S): at 11:59

## 2022-03-26 RX ADMIN — GABAPENTIN 100 MILLIGRAM(S): 400 CAPSULE ORAL at 05:06

## 2022-03-26 RX ADMIN — Medication 100 MILLIGRAM(S): at 20:58

## 2022-03-26 RX ADMIN — Medication 650 MILLIGRAM(S): at 23:19

## 2022-03-26 RX ADMIN — HYDROMORPHONE HYDROCHLORIDE 0.5 MILLIGRAM(S): 2 INJECTION INTRAMUSCULAR; INTRAVENOUS; SUBCUTANEOUS at 06:00

## 2022-03-26 RX ADMIN — Medication 500 MILLIGRAM(S): at 05:06

## 2022-03-26 RX ADMIN — PANTOPRAZOLE SODIUM 40 MILLIGRAM(S): 20 TABLET, DELAYED RELEASE ORAL at 08:20

## 2022-03-26 RX ADMIN — AMLODIPINE BESYLATE 5 MILLIGRAM(S): 2.5 TABLET ORAL at 08:17

## 2022-03-26 RX ADMIN — ENOXAPARIN SODIUM 40 MILLIGRAM(S): 100 INJECTION SUBCUTANEOUS at 08:18

## 2022-03-26 RX ADMIN — ATORVASTATIN CALCIUM 80 MILLIGRAM(S): 80 TABLET, FILM COATED ORAL at 21:01

## 2022-03-26 RX ADMIN — HYDROMORPHONE HYDROCHLORIDE 0.5 MILLIGRAM(S): 2 INJECTION INTRAMUSCULAR; INTRAVENOUS; SUBCUTANEOUS at 12:15

## 2022-03-26 RX ADMIN — POLYETHYLENE GLYCOL 3350 17 GRAM(S): 17 POWDER, FOR SOLUTION ORAL at 11:58

## 2022-03-26 RX ADMIN — Medication 650 MILLIGRAM(S): at 05:16

## 2022-03-26 RX ADMIN — Medication 100 MILLIGRAM(S): at 04:21

## 2022-03-26 RX ADMIN — SENNA PLUS 2 TABLET(S): 8.6 TABLET ORAL at 21:00

## 2022-03-26 RX ADMIN — Medication 650 MILLIGRAM(S): at 11:59

## 2022-03-26 RX ADMIN — HYDROMORPHONE HYDROCHLORIDE 0.5 MILLIGRAM(S): 2 INJECTION INTRAMUSCULAR; INTRAVENOUS; SUBCUTANEOUS at 06:05

## 2022-03-26 RX ADMIN — Medication 100 MILLIEQUIVALENT(S): at 02:39

## 2022-03-26 RX ADMIN — Medication 500 MILLIGRAM(S): at 17:20

## 2022-03-26 RX ADMIN — GABAPENTIN 100 MILLIGRAM(S): 400 CAPSULE ORAL at 13:21

## 2022-03-26 RX ADMIN — Medication 100 MILLIGRAM(S): at 12:08

## 2022-03-26 RX ADMIN — Medication 100 MILLIEQUIVALENT(S): at 02:10

## 2022-03-26 RX ADMIN — Medication 100 MILLIEQUIVALENT(S): at 04:17

## 2022-03-26 RX ADMIN — Medication 12.5 MILLIGRAM(S): at 17:55

## 2022-03-26 RX ADMIN — Medication 2 UNIT(S): at 08:18

## 2022-03-26 RX ADMIN — Medication 12.5 MILLIGRAM(S): at 11:58

## 2022-03-26 RX ADMIN — HYDROMORPHONE HYDROCHLORIDE 0.5 MILLIGRAM(S): 2 INJECTION INTRAMUSCULAR; INTRAVENOUS; SUBCUTANEOUS at 20:15

## 2022-03-26 RX ADMIN — Medication 12.5 MILLIGRAM(S): at 17:20

## 2022-03-26 RX ADMIN — HYDROMORPHONE HYDROCHLORIDE 0.5 MILLIGRAM(S): 2 INJECTION INTRAMUSCULAR; INTRAVENOUS; SUBCUTANEOUS at 12:00

## 2022-03-26 RX ADMIN — CLOPIDOGREL BISULFATE 75 MILLIGRAM(S): 75 TABLET, FILM COATED ORAL at 08:17

## 2022-03-26 RX ADMIN — Medication 650 MILLIGRAM(S): at 05:06

## 2022-03-26 RX ADMIN — OXYCODONE HYDROCHLORIDE 5 MILLIGRAM(S): 5 TABLET ORAL at 23:19

## 2022-03-26 RX ADMIN — HYDROMORPHONE HYDROCHLORIDE 0.5 MILLIGRAM(S): 2 INJECTION INTRAMUSCULAR; INTRAVENOUS; SUBCUTANEOUS at 20:07

## 2022-03-26 RX ADMIN — GABAPENTIN 100 MILLIGRAM(S): 400 CAPSULE ORAL at 21:01

## 2022-03-26 NOTE — PHYSICAL THERAPY INITIAL EVALUATION ADULT - PERTINENT HX OF CURRENT PROBLEM, REHAB EVAL
45y/o M with strong family Hx of CAD, HTN, smoker, c/o worsening of leg pain while walking was diagnosed with PAD after abnormal VIN in Jan 2022, recently had to be treated with IV and PO antibiotics for Rt 5th toe non healing ulcer in Jan 2022. Pt also endorsed intermittent CP for weeks and was evaluated by Dr Shea and underwent stress test on 3/9/22.

## 2022-03-26 NOTE — PROGRESS NOTE ADULT - SUBJECTIVE AND OBJECTIVE BOX
Patient seen and examined at the bedside.    Remained critically ill on continuous ICU monitoring.    OBJECTIVE:  Vital Signs Last 24 Hrs  T(C): 36.6 (26 Mar 2022 04:00), Max: 36.7 (25 Mar 2022 11:28)  T(F): 97.9 (26 Mar 2022 04:00), Max: 98 (25 Mar 2022 11:28)  HR: 99 (26 Mar 2022 06:00) (56 - 99)  BP: 122/68 (26 Mar 2022 06:00) (122/68 - 132/77)  BP(mean): 91 (26 Mar 2022 06:00) (91 - 96)  RR: 24 (26 Mar 2022 06:00) (11 - 27)  SpO2: 100% (26 Mar 2022 06:00) (100% - 100%)      Physical Exam:   General: NAD, multiple lines gtt  Neurology: nonfocal  Eyes: bilateral pupils equal and reactive   ENT/Neck:  Neck supple, trachea midline, No JVD   Respiratory: Clear bilaterally   CV: S1S2, no murmurs        [x] Sternal dressing, [x] Mediastinal CT, [x] Pleural CT, [x] DARIEL drain        [x] Sinus rhythm, [x] Temporary pacing  Abdominal: Soft, NT, ND +BS,   Extremities: 1-2+ pedal edema noted, + peripheral pulses   Skin: No Rashes, Hematoma, Ecchymosis                           Assessment:  46 yr male H/O Smoking / HTN / HLD / PVD S/P Cath multivessel CAD     S/p CABG x2 POD #1   Lactic Acidosis   Stress Hyperglycemia   Hypovolemic Shock   Post op respiratory insufficiency     Plan:   ***Neuro***  [x] Nonfocal    Post operative neuro assessment   [x] Tylenol, Oxycodone, Dilaudid, and gabapentin for analgesia     ***Cardiovascular***  Invasive hemodynamic monitoring, assess perfusion indices   SR 99 / CVP 1 / MAP 81 /  Hct 28.9% / Lactate 1.3   Volume: [x]  Albumin   Reassessment of hemodynamics post resuscitation   Monitor chest tube outputs   [x]  ASA [x] Statin   Serial EKG and cardiac enzymes     ***Pulmonary***  Extubated overnight, Comfortable on room air, SpO2 >95%  Continue to monitor SpO2 via pulse oximetry  Encourage bedside spirometry             ***GI***  [x]  Diet: Regular diet   [x] Protonix   [x] Miralax and Senna for bowel regimen    ***Renal***  Continue to monitor I/Os, BUN/Creatinine.   Replete lytes PRN  Figueredo present [x] positive     ***ID***  Continue Cefuroxime for perioperative antibiotic coverage,     ***Endocrine***  [x] Stress Hyperglycemia,  HbA1c 5.6%                - [x] Insulin gtt               - Need tight glycemic control to prevent wound infection.        Patient requires continuous monitoring with bedside rhythm monitoring, pulse oximetry monitoring, and continuous central venous and arterial pressure monitoring; and intermittent blood gas analysis. Care plan discussed with the ICU care team.   Patient remained critical, at risk for life threatening decompensation.    I have spent 30 minutes providing critical care management to this patient.    By signing my name below, I, Barbara Chambers, attest that this documentation has been prepared under the direction and in the presence of Shantal Armenta NP   Electronically signed: Deirdre Castillo, 03-26-22 @ 07:05    I, Shantal Armenta NP , personally performed the services described in this documentation. all medical record entries made by the scribe were at my direction and in my presence. I have reviewed the chart and agree that the record reflects my personal performance and is accurate and complete  Electronically signed: Shantal Armenta NP  Patient seen and examined at the bedside.    Remained critically ill on continuous ICU monitoring.    OBJECTIVE:  Vital Signs Last 24 Hrs  T(C): 36.6 (26 Mar 2022 04:00), Max: 36.7 (25 Mar 2022 11:28)  T(F): 97.9 (26 Mar 2022 04:00), Max: 98 (25 Mar 2022 11:28)  HR: 99 (26 Mar 2022 06:00) (56 - 99)  BP: 122/68 (26 Mar 2022 06:00) (122/68 - 132/77)  BP(mean): 91 (26 Mar 2022 06:00) (91 - 96)  RR: 24 (26 Mar 2022 06:00) (11 - 27)  SpO2: 100% (26 Mar 2022 06:00) (100% - 100%)      Physical Exam:   General: NAD, sitting in chair  Neurology: nonfocal  Eyes: bilateral pupils equal and reactive   ENT/Neck:  Neck supple, trachea midline, No JVD   Respiratory: Clear bilaterally   CV: S1S2, no murmurs        [x] Sternal dressing, [x] Mediastinal CT, [x] Pleural CT        [x] Sinus rhythm, [x] Temporary pacing wires to external box  Abdominal: Soft, NT, ND +BS,   Extremities: trace pedal edema noted, + peripheral pulses   Skin: No Rashes, Hematoma, Ecchymosis                           Assessment:  46 yr male H/O Smoking / HTN / HLD / PVD S/P Cath multivessel CAD     S/p CABG x2 POD #1   Lactic Acidosis   Stress Hyperglycemia   Hypovolemic Shock   Post op respiratory insufficiency     Plan:   ***Neuro***  [x] Nonfocal    Post operative neuro assessment   [x] Tylenol, Oxycodone, Dilaudid, and gabapentin for analgesia   PT, progressive ambulation    ***Cardiovascular***  Invasive hemodynamic monitoring, assess perfusion indices   SR 99 / CVP 1 / MAP 81 /  Hct 28.9% / Lactate 1.3    Monitor chest tube outputs   [x]  ASA [x] Statin [x] Plavix for graft prophylaxis, on discharge plan to transition plavix to brilinta  [x] start norvasc for radial graft prophylaxis  [x] start BB for afib prophylaxis    ***Pulmonary***  Extubated overnight, Comfortable on room air, SpO2 >95%  Continue to monitor SpO2 via pulse oximetry  Encourage bedside spirometry, pulmonary toileting             ***GI***  [x]  Diet: consistent carb diet   [x] Protonix for GI prophylaxis  [x] Miralax and Senna for bowel regimen    ***Renal***  Continue to monitor I/Os, BUN/Creatinine.   Replete lytes PRN  Figueredo present [x] positive , will DC for trial void    ***ID***  Continue Cefuroxime for perioperative antibiotic coverage,     ***Endocrine***  [x] Stress Hyperglycemia,  HbA1c 5.6%                - [x] Insulin gtt               - Need tight glycemic control to prevent wound infection.      ***Heme***  acute blood loss anemia, continue to montitor  [x] lovenox for VTE prophylaxis + venodynes        Patient requires continuous monitoring with bedside rhythm monitoring, pulse oximetry monitoring, and continuous central venous and arterial pressure monitoring; and intermittent blood gas analysis. Care plan discussed with the ICU care team.   Patient remained critical, at risk for life threatening decompensation.    I have spent 30 minutes providing critical care management to this patient.    By signing my name below, I, Barbara Chambers, attest that this documentation has been prepared under the direction and in the presence of Shantal Armenta NP   Electronically signed: Deirdre Castillo, 03-26-22 @ 07:05    I, Shantal Armenta NP , personally performed the services described in this documentation. all medical record entries made by the scribe were at my direction and in my presence. I have reviewed the chart and agree that the record reflects my personal performance and is accurate and complete  Electronically signed: Shantal Armenta NP  Patient seen and examined at the bedside.    Remained critically ill on continuous ICU monitoring.    OBJECTIVE:  Vital Signs Last 24 Hrs  T(C): 36.6 (26 Mar 2022 04:00), Max: 36.7 (25 Mar 2022 11:28)  T(F): 97.9 (26 Mar 2022 04:00), Max: 98 (25 Mar 2022 11:28)  HR: 99 (26 Mar 2022 06:00) (56 - 99)  BP: 122/68 (26 Mar 2022 06:00) (122/68 - 132/77)  BP(mean): 91 (26 Mar 2022 06:00) (91 - 96)  RR: 24 (26 Mar 2022 06:00) (11 - 27)  SpO2: 100% (26 Mar 2022 06:00) (100% - 100%)      Physical Exam:   General: NAD, sitting in chair  Neurology: nonfocal  Eyes: bilateral pupils equal and reactive   ENT/Neck:  Neck supple, trachea midline, No JVD   Respiratory: Clear bilaterally   CV: S1S2, no murmurs        [x] Sternal dressing, [x] Mediastinal CT, [x] Pleural CT        [x] Sinus rhythm, [x] Temporary pacing wires to external box  Abdominal: Soft, NT, ND +BS,   Extremities: trace pedal edema noted, + peripheral pulses   Skin: No Rashes, Hematoma, Ecchymosis                           Assessment:  46 yr male H/O Smoking / HTN / HLD / PVD S/P Cath multivessel CAD     S/p CABG x2 POD #1   Lactic Acidosis   Stress Hyperglycemia   Hypovolemic Shock   Post op respiratory insufficiency     Plan:   ***Neuro***  [x] Nonfocal    Post operative neuro assessment   [x] Tylenol, Oxycodone, Dilaudid, and gabapentin for analgesia/adjunct therapy   PT, progressive ambulation    ***Cardiovascular***  Invasive hemodynamic monitoring, assess perfusion indices   SR 99 / CVP 1 / MAP 81 /  Hct 28.9% / Lactate 1.3    Monitor chest tube outputs   [x]  ASA [x] Statin [x] Plavix for graft prophylaxis, on discharge plan to transition plavix to brilinta  [x] start norvasc for radial graft prophylaxis  [x] start BB for afib prophylaxis    ***Pulmonary***  Extubated overnight, Comfortable on room air, SpO2 >95%  Continue to monitor SpO2 via pulse oximetry  Encourage bedside spirometry, pulmonary toileting             ***GI***  [x]  Diet: consistent carb diet   [x] Protonix for GI prophylaxis  [x] Miralax and Senna for bowel regimen    ***Renal***  Continue to monitor I/Os, BUN/Creatinine.   Replete lytes PRN  Figueredo present [x] positive , will DC for trial void    ***ID***  Continue Cefuroxime for perioperative antibiotic coverage,     ***Endocrine***  [x] Stress Hyperglycemia,  HbA1c 5.6%                - [x] Insulin gtt               - Need tight glycemic control to prevent wound infection.      ***Heme***  acute blood loss anemia, continue to montitor  [x] lovenox for VTE prophylaxis + venodynes    Stable for transfer to [facility]        Patient requires continuous monitoring with bedside rhythm monitoring, pulse oximetry monitoring, and continuous central venous; and intermittent blood gas analysis. Care plan discussed with the ICU care team.   Patient remained critical, at risk for life threatening decompensation.    I have spent 30 minutes providing critical care management to this patient.    By signing my name below, I, Barbara Chambers, attest that this documentation has been prepared under the direction and in the presence of Shantal Armenta NP   Electronically signed: Deirdre Castillo, 03-26-22 @ 07:05    I, Shantal Armenta NP , personally performed the services described in this documentation. all medical record entries made by the scribe were at my direction and in my presence. I have reviewed the chart and agree that the record reflects my personal performance and is accurate and complete  Electronically signed: Shantal Armenta NP

## 2022-03-26 NOTE — PHYSICAL THERAPY INITIAL EVALUATION ADULT - GENERAL OBSERVATIONS, REHAB EVAL
pt received seated in chair in NAD +ICU monitoring, central line, external pacer, chest tube, 2L O2 NC, eckert catheter, motivated to work with PT, daughter at bedside

## 2022-03-26 NOTE — PHYSICAL THERAPY INITIAL EVALUATION ADULT - ADDITIONAL COMMENTS
Pt lives with family in second floor of home; +steps to enter; independent prior to admission with no AD.

## 2022-03-27 DIAGNOSIS — Z95.1 PRESENCE OF AORTOCORONARY BYPASS GRAFT: ICD-10-CM

## 2022-03-27 LAB
ALBUMIN SERPL ELPH-MCNC: 4.6 G/DL — SIGNIFICANT CHANGE UP (ref 3.3–5)
ALP SERPL-CCNC: 43 U/L — SIGNIFICANT CHANGE UP (ref 40–120)
ALT FLD-CCNC: 14 U/L — SIGNIFICANT CHANGE UP (ref 10–45)
ANION GAP SERPL CALC-SCNC: 14 MMOL/L — SIGNIFICANT CHANGE UP (ref 5–17)
AST SERPL-CCNC: 20 U/L — SIGNIFICANT CHANGE UP (ref 10–40)
BILIRUB SERPL-MCNC: 0.3 MG/DL — SIGNIFICANT CHANGE UP (ref 0.2–1.2)
BUN SERPL-MCNC: 18 MG/DL — SIGNIFICANT CHANGE UP (ref 7–23)
CALCIUM SERPL-MCNC: 9.1 MG/DL — SIGNIFICANT CHANGE UP (ref 8.4–10.5)
CHLORIDE SERPL-SCNC: 103 MMOL/L — SIGNIFICANT CHANGE UP (ref 96–108)
CO2 SERPL-SCNC: 23 MMOL/L — SIGNIFICANT CHANGE UP (ref 22–31)
CREAT SERPL-MCNC: 0.73 MG/DL — SIGNIFICANT CHANGE UP (ref 0.5–1.3)
EGFR: 112 ML/MIN/1.73M2 — SIGNIFICANT CHANGE UP
GLUCOSE BLDC GLUCOMTR-MCNC: 113 MG/DL — HIGH (ref 70–99)
GLUCOSE BLDC GLUCOMTR-MCNC: 120 MG/DL — HIGH (ref 70–99)
GLUCOSE BLDC GLUCOMTR-MCNC: 124 MG/DL — HIGH (ref 70–99)
GLUCOSE BLDC GLUCOMTR-MCNC: 131 MG/DL — HIGH (ref 70–99)
GLUCOSE SERPL-MCNC: 136 MG/DL — HIGH (ref 70–99)
HCT VFR BLD CALC: 29.3 % — LOW (ref 39–50)
HGB BLD-MCNC: 10.1 G/DL — LOW (ref 13–17)
MAGNESIUM SERPL-MCNC: 2.3 MG/DL — SIGNIFICANT CHANGE UP (ref 1.6–2.6)
MCHC RBC-ENTMCNC: 31.6 PG — SIGNIFICANT CHANGE UP (ref 27–34)
MCHC RBC-ENTMCNC: 34.5 GM/DL — SIGNIFICANT CHANGE UP (ref 32–36)
MCV RBC AUTO: 91.6 FL — SIGNIFICANT CHANGE UP (ref 80–100)
NRBC # BLD: 0 /100 WBCS — SIGNIFICANT CHANGE UP (ref 0–0)
PHOSPHATE SERPL-MCNC: 3.3 MG/DL — SIGNIFICANT CHANGE UP (ref 2.5–4.5)
PLATELET # BLD AUTO: 191 K/UL — SIGNIFICANT CHANGE UP (ref 150–400)
POTASSIUM SERPL-MCNC: 4.3 MMOL/L — SIGNIFICANT CHANGE UP (ref 3.5–5.3)
POTASSIUM SERPL-SCNC: 4.3 MMOL/L — SIGNIFICANT CHANGE UP (ref 3.5–5.3)
PROT SERPL-MCNC: 6.4 G/DL — SIGNIFICANT CHANGE UP (ref 6–8.3)
RBC # BLD: 3.2 M/UL — LOW (ref 4.2–5.8)
RBC # FLD: 13.2 % — SIGNIFICANT CHANGE UP (ref 10.3–14.5)
SODIUM SERPL-SCNC: 140 MMOL/L — SIGNIFICANT CHANGE UP (ref 135–145)
WBC # BLD: 18.71 K/UL — HIGH (ref 3.8–10.5)
WBC # FLD AUTO: 18.71 K/UL — HIGH (ref 3.8–10.5)

## 2022-03-27 PROCEDURE — 71045 X-RAY EXAM CHEST 1 VIEW: CPT | Mod: 26

## 2022-03-27 RX ORDER — SORBITOL SOLUTION 70 %
30 SOLUTION, ORAL MISCELLANEOUS ONCE
Refills: 0 | Status: COMPLETED | OUTPATIENT
Start: 2022-03-27 | End: 2022-03-27

## 2022-03-27 RX ORDER — METOPROLOL TARTRATE 50 MG
25 TABLET ORAL EVERY 8 HOURS
Refills: 0 | Status: DISCONTINUED | OUTPATIENT
Start: 2022-03-27 | End: 2022-03-29

## 2022-03-27 RX ADMIN — Medication 500 MILLIGRAM(S): at 17:01

## 2022-03-27 RX ADMIN — POLYETHYLENE GLYCOL 3350 17 GRAM(S): 17 POWDER, FOR SOLUTION ORAL at 13:36

## 2022-03-27 RX ADMIN — Medication 100 MILLIGRAM(S): at 05:10

## 2022-03-27 RX ADMIN — ENOXAPARIN SODIUM 40 MILLIGRAM(S): 100 INJECTION SUBCUTANEOUS at 09:02

## 2022-03-27 RX ADMIN — SENNA PLUS 2 TABLET(S): 8.6 TABLET ORAL at 21:20

## 2022-03-27 RX ADMIN — OXYCODONE HYDROCHLORIDE 10 MILLIGRAM(S): 5 TABLET ORAL at 10:00

## 2022-03-27 RX ADMIN — Medication 650 MILLIGRAM(S): at 00:56

## 2022-03-27 RX ADMIN — Medication 25 MILLIGRAM(S): at 00:14

## 2022-03-27 RX ADMIN — Medication 30 MILLILITER(S): at 13:39

## 2022-03-27 RX ADMIN — Medication 650 MILLIGRAM(S): at 14:40

## 2022-03-27 RX ADMIN — Medication 25 MILLIGRAM(S): at 05:12

## 2022-03-27 RX ADMIN — SODIUM CHLORIDE 3 MILLILITER(S): 9 INJECTION INTRAMUSCULAR; INTRAVENOUS; SUBCUTANEOUS at 05:41

## 2022-03-27 RX ADMIN — Medication 650 MILLIGRAM(S): at 05:00

## 2022-03-27 RX ADMIN — Medication 81 MILLIGRAM(S): at 13:35

## 2022-03-27 RX ADMIN — Medication 650 MILLIGRAM(S): at 17:01

## 2022-03-27 RX ADMIN — GABAPENTIN 100 MILLIGRAM(S): 400 CAPSULE ORAL at 21:17

## 2022-03-27 RX ADMIN — SODIUM CHLORIDE 3 MILLILITER(S): 9 INJECTION INTRAMUSCULAR; INTRAVENOUS; SUBCUTANEOUS at 13:45

## 2022-03-27 RX ADMIN — Medication 25 MILLIGRAM(S): at 13:34

## 2022-03-27 RX ADMIN — GABAPENTIN 100 MILLIGRAM(S): 400 CAPSULE ORAL at 05:12

## 2022-03-27 RX ADMIN — PANTOPRAZOLE SODIUM 40 MILLIGRAM(S): 20 TABLET, DELAYED RELEASE ORAL at 05:12

## 2022-03-27 RX ADMIN — Medication 650 MILLIGRAM(S): at 18:00

## 2022-03-27 RX ADMIN — OXYCODONE HYDROCHLORIDE 5 MILLIGRAM(S): 5 TABLET ORAL at 00:56

## 2022-03-27 RX ADMIN — Medication 25 MILLIGRAM(S): at 21:18

## 2022-03-27 RX ADMIN — Medication 650 MILLIGRAM(S): at 13:35

## 2022-03-27 RX ADMIN — SODIUM CHLORIDE 3 MILLILITER(S): 9 INJECTION INTRAMUSCULAR; INTRAVENOUS; SUBCUTANEOUS at 21:35

## 2022-03-27 RX ADMIN — GABAPENTIN 100 MILLIGRAM(S): 400 CAPSULE ORAL at 13:35

## 2022-03-27 RX ADMIN — Medication 500 MILLIGRAM(S): at 05:12

## 2022-03-27 RX ADMIN — AMLODIPINE BESYLATE 5 MILLIGRAM(S): 2.5 TABLET ORAL at 05:12

## 2022-03-27 RX ADMIN — ATORVASTATIN CALCIUM 80 MILLIGRAM(S): 80 TABLET, FILM COATED ORAL at 21:17

## 2022-03-27 RX ADMIN — CLOPIDOGREL BISULFATE 75 MILLIGRAM(S): 75 TABLET, FILM COATED ORAL at 13:35

## 2022-03-27 RX ADMIN — OXYCODONE HYDROCHLORIDE 5 MILLIGRAM(S): 5 TABLET ORAL at 17:02

## 2022-03-27 RX ADMIN — OXYCODONE HYDROCHLORIDE 10 MILLIGRAM(S): 5 TABLET ORAL at 09:02

## 2022-03-27 RX ADMIN — OXYCODONE HYDROCHLORIDE 5 MILLIGRAM(S): 5 TABLET ORAL at 18:00

## 2022-03-27 RX ADMIN — Medication 650 MILLIGRAM(S): at 06:00

## 2022-03-27 NOTE — PROGRESS NOTE ADULT - SUBJECTIVE AND OBJECTIVE BOX
Subjective: " "    TELEMETRY:    VITAL SIGNS    Vital Signs Last 24 Hrs  T(C): 37 (03-27-22 @ 00:50), Max: 37.8 (03-26-22 @ 12:00)  T(F): 98.6 (03-27-22 @ 00:50), Max: 100 (03-26-22 @ 12:00)  HR: 84 (03-27-22 @ 00:50) (70 - 99)  BP: 133/76 (03-27-22 @ 00:50) (109/67 - 149/72)  RR: 20 (03-27-22 @ 00:50) (17 - 33)  SpO2: 98% (03-27-22 @ 00:50) (97% - 100%)            03-25 @ 07:01  -  03-26 @ 07:00  --------------------------------------------------------  IN: 3481.6 mL / OUT: 3080 mL / NET: 401.6 mL    03-26 @ 07:01  -  03-27 @ 01:07  --------------------------------------------------------  IN: 670 mL / OUT: 895 mL / NET: -225 mL       Daily     Daily   Admit Wt: Drug Dosing Weight  Height (cm): 172.7 (25 Mar 2022 11:28)  Weight (kg): 70.3 (25 Mar 2022 11:28)  BMI (kg/m2): 23.6 (25 Mar 2022 11:28)  BSA (m2): 1.83 (25 Mar 2022 11:28)    Bilirubin Total, Serum: 0.3 mg/dL (03-27 @ 00:20)  Bilirubin Total, Serum: 0.6 mg/dL (03-26 @ 02:26)    CAPILLARY BLOOD GLUCOSE  137 (26 Mar 2022 06:00)  122 (26 Mar 2022 04:30)  100 (26 Mar 2022 04:00)  110 (26 Mar 2022 03:00)      POCT Blood Glucose.: 132 mg/dL (26 Mar 2022 20:57)  POCT Blood Glucose.: 114 mg/dL (26 Mar 2022 16:14)  POCT Blood Glucose.: 114 mg/dL (26 Mar 2022 10:54)  POCT Blood Glucose.: 159 mg/dL (26 Mar 2022 07:58)  POCT Blood Glucose.: 145 mg/dL (26 Mar 2022 06:53)  POCT Blood Glucose.: 137 mg/dL (26 Mar 2022 05:58)  POCT Blood Glucose.: 111 mg/dL (26 Mar 2022 01:56)              PHYSICAL EXAM    Neurology: alert and oriented x 3, nonfocal, no gross deficits  CV : tele:  RSR  Sternal Wound :  CDI with dressing , Stable  Lungs: clear. RR easy, unlabored   Abdomen: soft, nontender, nondistended, positive bowel sounds, bowel movement   Neg N/V/D   :  pt voiding without difficulty   Extremities:   MADSEN; edema, neg calf tenderness.   PPP bilaterally      PW:    Chest tubes:        acetaminophen     Tablet .. 650 milliGRAM(s) Oral every 6 hours  amLODIPine   Tablet 5 milliGRAM(s) Oral daily  ascorbic acid 500 milliGRAM(s) Oral two times a day  aspirin enteric coated 81 milliGRAM(s) Oral daily  atorvastatin 80 milliGRAM(s) Oral at bedtime  bisacodyl Suppository 10 milliGRAM(s) Rectal once  cefuroxime  IVPB 1500 milliGRAM(s) IV Intermittent every 8 hours  clopidogrel Tablet 75 milliGRAM(s) Oral daily  dextrose 50% Injectable 50 milliLiter(s) IV Push every 15 minutes  dextrose 50% Injectable 25 milliLiter(s) IV Push every 15 minutes  enoxaparin Injectable 40 milliGRAM(s) SubCutaneous every 24 hours  gabapentin 100 milliGRAM(s) Oral every 8 hours  HYDROmorphone  Injectable 0.5 milliGRAM(s) IV Push every 6 hours PRN  insulin lispro (ADMELOG) corrective regimen sliding scale   SubCutaneous three times a day before meals  insulin lispro (ADMELOG) corrective regimen sliding scale   SubCutaneous at bedtime  metoprolol tartrate 25 milliGRAM(s) Oral every 8 hours  oxyCODONE    IR 5 milliGRAM(s) Oral every 4 hours PRN  oxyCODONE    IR 10 milliGRAM(s) Oral every 4 hours PRN  pantoprazole    Tablet 40 milliGRAM(s) Oral before breakfast  polyethylene glycol 3350 17 Gram(s) Oral daily  potassium chloride  10 mEq/50 mL IVPB 10 milliEquivalent(s) IV Intermittent every 1 hour  potassium chloride  10 mEq/50 mL IVPB 10 milliEquivalent(s) IV Intermittent every 1 hour  potassium chloride  10 mEq/50 mL IVPB 10 milliEquivalent(s) IV Intermittent every 1 hour  senna 2 Tablet(s) Oral at bedtime  sodium chloride 0.9% lock flush 3 milliLiter(s) IV Push every 8 hours                         Subjective: "I feel ok, little pain"  Patient endorses some incisional pain. No SOB/difficulty breathing.  States he ambulated twice in hallway while in ICU.  No BM since surgery    TELEMETRY: NSR 70    VITAL SIGNS    Vital Signs Last 24 Hrs  T(C): 37 (03-27-22 @ 00:50), Max: 37.8 (03-26-22 @ 12:00)  T(F): 98.6 (03-27-22 @ 00:50), Max: 100 (03-26-22 @ 12:00)  HR: 84 (03-27-22 @ 00:50) (70 - 99)  BP: 133/76 (03-27-22 @ 00:50) (109/67 - 149/72)  RR: 20 (03-27-22 @ 00:50) (17 - 33)  SpO2: 98% (03-27-22 @ 00:50) (97% - 100%)            03-25 @ 07:01  -  03-26 @ 07:00  --------------------------------------------------------  IN: 3481.6 mL / OUT: 3080 mL / NET: 401.6 mL    03-26 @ 07:01  -  03-27 @ 01:07  --------------------------------------------------------  IN: 670 mL / OUT: 895 mL / NET: -225 mL       Daily     Daily   Admit Wt: Drug Dosing Weight  Height (cm): 172.7 (25 Mar 2022 11:28)  Weight (kg): 70.3 (25 Mar 2022 11:28)  BMI (kg/m2): 23.6 (25 Mar 2022 11:28)  BSA (m2): 1.83 (25 Mar 2022 11:28)    Bilirubin Total, Serum: 0.3 mg/dL (03-27 @ 00:20)  Bilirubin Total, Serum: 0.6 mg/dL (03-26 @ 02:26)    CAPILLARY BLOOD GLUCOSE  137 (26 Mar 2022 06:00)  122 (26 Mar 2022 04:30)  100 (26 Mar 2022 04:00)  110 (26 Mar 2022 03:00)      POCT Blood Glucose.: 132 mg/dL (26 Mar 2022 20:57)  POCT Blood Glucose.: 114 mg/dL (26 Mar 2022 16:14)  POCT Blood Glucose.: 114 mg/dL (26 Mar 2022 10:54)  POCT Blood Glucose.: 159 mg/dL (26 Mar 2022 07:58)  POCT Blood Glucose.: 145 mg/dL (26 Mar 2022 06:53)  POCT Blood Glucose.: 137 mg/dL (26 Mar 2022 05:58)  POCT Blood Glucose.: 111 mg/dL (26 Mar 2022 01:56)          PHYSICAL EXAM    Neurology: alert and oriented x 3, nonfocal, no gross deficits  CV : RRR +S1/S2  Sternal Wound :  CDI with dressing , Stable  Lungs: CTA BL RR easy, unlabored   Abdomen: soft, nontender, nondistended, positive bowel sounds, No bowel movement post surgery   Neg N/V/D   :  pt voiding without difficulty   Extremities: Left radial harvest site with gauze and ace wrap c/d/i. Hand feels warm/well perfused. MADSEN; No LE edema, neg calf tenderness.   PPP bilaterally      PW: V-Wires to EPM currently off    Chest tubes: Left pleural chest tube draining small amount of serosang drainage currently to suction        acetaminophen     Tablet .. 650 milliGRAM(s) Oral every 6 hours  amLODIPine   Tablet 5 milliGRAM(s) Oral daily  ascorbic acid 500 milliGRAM(s) Oral two times a day  aspirin enteric coated 81 milliGRAM(s) Oral daily  atorvastatin 80 milliGRAM(s) Oral at bedtime  bisacodyl Suppository 10 milliGRAM(s) Rectal once  cefuroxime  IVPB 1500 milliGRAM(s) IV Intermittent every 8 hours  clopidogrel Tablet 75 milliGRAM(s) Oral daily  dextrose 50% Injectable 50 milliLiter(s) IV Push every 15 minutes  dextrose 50% Injectable 25 milliLiter(s) IV Push every 15 minutes  enoxaparin Injectable 40 milliGRAM(s) SubCutaneous every 24 hours  gabapentin 100 milliGRAM(s) Oral every 8 hours  HYDROmorphone  Injectable 0.5 milliGRAM(s) IV Push every 6 hours PRN  insulin lispro (ADMELOG) corrective regimen sliding scale   SubCutaneous three times a day before meals  insulin lispro (ADMELOG) corrective regimen sliding scale   SubCutaneous at bedtime  metoprolol tartrate 25 milliGRAM(s) Oral every 8 hours  oxyCODONE    IR 5 milliGRAM(s) Oral every 4 hours PRN  oxyCODONE    IR 10 milliGRAM(s) Oral every 4 hours PRN  pantoprazole    Tablet 40 milliGRAM(s) Oral before breakfast  polyethylene glycol 3350 17 Gram(s) Oral daily  potassium chloride  10 mEq/50 mL IVPB 10 milliEquivalent(s) IV Intermittent every 1 hour  potassium chloride  10 mEq/50 mL IVPB 10 milliEquivalent(s) IV Intermittent every 1 hour  potassium chloride  10 mEq/50 mL IVPB 10 milliEquivalent(s) IV Intermittent every 1 hour  senna 2 Tablet(s) Oral at bedtime  sodium chloride 0.9% lock flush 3 milliLiter(s) IV Push every 8 hours

## 2022-03-27 NOTE — PROGRESS NOTE ADULT - PROBLEM SELECTOR PLAN 1
3/25 s/p CABG x2 [LIMA-LAD, L Rad = OM], V-Wires    -V-Wires connected to EPM.  Currently NSR 70s on tele    -Left pleural chest tube remains in f/u Xray in AM.  Will continue to monitor for air leak    -Continue Lopressor 25mg TID for HTN/arrythmia prophylaxis  -Continue Norvasc 5mg PO QD for left radial graft prophylaxis  -Continue ASA/Plavix/Atorvastatin  -Continue Lovenox 40mg SQ Daily for dvt ppx   -Due for BM post surgery.  Continue bowel regimen    -Patient encouraged to ambulated in hallway as tolerated, OOB to chair for meals, cough and deep breathing exercises and incentive spirometry.

## 2022-03-27 NOTE — PROGRESS NOTE ADULT - PROBLEM SELECTOR PLAN 1
3/25 s/p CABG x2 [LIMA-LAD, L Rad = OM], V-Wires      -Continue Lopressor 25mg TID for HTN/arrythmia prophylaxis  -Continue Norvasc 5mg PO QD for left radial graft prophylaxis  -Continue ASA/Plavix/Atorvastatin  -Continue Lovenox 40mg SQ Daily for dvt ppx   -Due for BM post surgery.  Continue bowel regimen    -Patient encouraged to ambulated in hallway as tolerated, OOB to chair for meals, cough and deep breathing exercises and incentive spirometry.

## 2022-03-27 NOTE — PROGRESS NOTE ADULT - SUBJECTIVE AND OBJECTIVE BOX
VITAL SIGNS    Telemetry:  SR 70's  Vital Signs Last 24 Hrs  T(C): 36.6 (22 @ 04:21), Max: 37.8 (22 @ 12:00)  T(F): 97.9 (22 @ 04:21), Max: 100 (22 @ 12:00)  HR: 78 (22 @ 04:21) (70 - 86)  BP: 144/78 (22 @ 04:21) (111/65 - 149/72)  RR: 20 (22 @ 04:21) (17 - 33)  SpO2: 96% (22 @ 04:21) (96% - 100%)             @ 07:01  -   @ 07:00  --------------------------------------------------------  IN: 870 mL / OUT: 1195 mL / NET: -325 mL     @ 07:01  -   @ 11:51  --------------------------------------------------------  IN: 0 mL / OUT: 762 mL / NET: -762 mL       Daily     Daily Weight in k.6 (27 Mar 2022 07:38)  Admit Wt: Drug Dosing Weight  Height (cm): 172.7 (25 Mar 2022 11:28)  Weight (kg): 70.3 (25 Mar 2022 11:28)  BMI (kg/m2): 23.6 (25 Mar 2022 11:28)  BSA (m2): 1.83 (25 Mar 2022 11:28)    Bilirubin Total, Serum: 0.3 mg/dL ( @ 00:20)    CAPILLARY BLOOD GLUCOSE      POCT Blood Glucose.: 113 mg/dL (27 Mar 2022 11:30)  POCT Blood Glucose.: 124 mg/dL (27 Mar 2022 08:08)  POCT Blood Glucose.: 132 mg/dL (26 Mar 2022 20:57)  POCT Blood Glucose.: 114 mg/dL (26 Mar 2022 16:14)          MEDICATIONS  acetaminophen     Tablet .. 650 milliGRAM(s) Oral every 6 hours  amLODIPine   Tablet 5 milliGRAM(s) Oral daily  ascorbic acid 500 milliGRAM(s) Oral two times a day  aspirin enteric coated 81 milliGRAM(s) Oral daily  atorvastatin 80 milliGRAM(s) Oral at bedtime  bisacodyl Suppository 10 milliGRAM(s) Rectal once  clopidogrel Tablet 75 milliGRAM(s) Oral daily  dextrose 50% Injectable 50 milliLiter(s) IV Push every 15 minutes  dextrose 50% Injectable 25 milliLiter(s) IV Push every 15 minutes  enoxaparin Injectable 40 milliGRAM(s) SubCutaneous every 24 hours  gabapentin 100 milliGRAM(s) Oral every 8 hours  HYDROmorphone  Injectable 0.5 milliGRAM(s) IV Push every 6 hours PRN  insulin lispro (ADMELOG) corrective regimen sliding scale   SubCutaneous three times a day before meals  insulin lispro (ADMELOG) corrective regimen sliding scale   SubCutaneous at bedtime  metoprolol tartrate 25 milliGRAM(s) Oral every 8 hours  oxyCODONE    IR 5 milliGRAM(s) Oral every 4 hours PRN  oxyCODONE    IR 10 milliGRAM(s) Oral every 4 hours PRN  pantoprazole    Tablet 40 milliGRAM(s) Oral before breakfast  polyethylene glycol 3350 17 Gram(s) Oral daily  potassium chloride  10 mEq/50 mL IVPB 10 milliEquivalent(s) IV Intermittent every 1 hour  potassium chloride  10 mEq/50 mL IVPB 10 milliEquivalent(s) IV Intermittent every 1 hour  potassium chloride  10 mEq/50 mL IVPB 10 milliEquivalent(s) IV Intermittent every 1 hour  senna 2 Tablet(s) Oral at bedtime  sodium chloride 0.9% lock flush 3 milliLiter(s) IV Push every 8 hours      >>> <<<  PHYSICAL EXAM  Subjective: NAD  Neurology: alert and oriented x 3, nonfocal, no gross deficits  CV : s1s2 PW  Sternal Wound :  CDI , Stable  Lungs: CTA left pleural chest tube to lws no air leak  Abdomen: soft, NT,ND, ( -)BM + flatus  :  voiding  Extremities:   -c/c/e    LABS      140  |  103  |  18  ----------------------------<  136<H>  4.3   |  23  |  0.73    Ca    9.1      27 Mar 2022 00:20  Phos  3.3       Mg     2.3         TPro  6.4  /  Alb  4.6  /  TBili  0.3  /  DBili  x   /  AST  20  /  ALT  14  /  AlkPhos  43                                   10.1   18.71 )-----------( 191      ( 27 Mar 2022 00:20 )             29.3          PT/INR - ( 26 Mar 2022 02:23 )   PT: 13.8 sec;   INR: 1.19 ratio         PTT - ( 26 Mar 2022 02:23 )  PTT:24.2 sec       PAST MEDICAL & SURGICAL HISTORY:  HTN (hypertension)    PAD (peripheral artery disease)  Had recent po ABX treatment for Rt foot toe ulcer    No significant past surgical history

## 2022-03-28 LAB
ALBUMIN SERPL ELPH-MCNC: 4.1 G/DL — SIGNIFICANT CHANGE UP (ref 3.3–5)
ALP SERPL-CCNC: 47 U/L — SIGNIFICANT CHANGE UP (ref 40–120)
ALT FLD-CCNC: 31 U/L — SIGNIFICANT CHANGE UP (ref 10–45)
ANION GAP SERPL CALC-SCNC: 13 MMOL/L — SIGNIFICANT CHANGE UP (ref 5–17)
AST SERPL-CCNC: 21 U/L — SIGNIFICANT CHANGE UP (ref 10–40)
BASOPHILS # BLD AUTO: 0.02 K/UL — SIGNIFICANT CHANGE UP (ref 0–0.2)
BASOPHILS NFR BLD AUTO: 0.1 % — SIGNIFICANT CHANGE UP (ref 0–2)
BILIRUB SERPL-MCNC: 0.5 MG/DL — SIGNIFICANT CHANGE UP (ref 0.2–1.2)
BUN SERPL-MCNC: 22 MG/DL — SIGNIFICANT CHANGE UP (ref 7–23)
CALCIUM SERPL-MCNC: 9.4 MG/DL — SIGNIFICANT CHANGE UP (ref 8.4–10.5)
CHLORIDE SERPL-SCNC: 103 MMOL/L — SIGNIFICANT CHANGE UP (ref 96–108)
CO2 SERPL-SCNC: 25 MMOL/L — SIGNIFICANT CHANGE UP (ref 22–31)
CREAT SERPL-MCNC: 0.87 MG/DL — SIGNIFICANT CHANGE UP (ref 0.5–1.3)
EGFR: 106 ML/MIN/1.73M2 — SIGNIFICANT CHANGE UP
EOSINOPHIL # BLD AUTO: 0.04 K/UL — SIGNIFICANT CHANGE UP (ref 0–0.5)
EOSINOPHIL NFR BLD AUTO: 0.3 % — SIGNIFICANT CHANGE UP (ref 0–6)
GLUCOSE BLDC GLUCOMTR-MCNC: 100 MG/DL — HIGH (ref 70–99)
GLUCOSE BLDC GLUCOMTR-MCNC: 110 MG/DL — HIGH (ref 70–99)
GLUCOSE BLDC GLUCOMTR-MCNC: 122 MG/DL — HIGH (ref 70–99)
GLUCOSE BLDC GLUCOMTR-MCNC: 75 MG/DL — SIGNIFICANT CHANGE UP (ref 70–99)
GLUCOSE BLDC GLUCOMTR-MCNC: 90 MG/DL — SIGNIFICANT CHANGE UP (ref 70–99)
GLUCOSE BLDC GLUCOMTR-MCNC: 97 MG/DL — SIGNIFICANT CHANGE UP (ref 70–99)
GLUCOSE BLDC GLUCOMTR-MCNC: 98 MG/DL — SIGNIFICANT CHANGE UP (ref 70–99)
GLUCOSE SERPL-MCNC: 104 MG/DL — HIGH (ref 70–99)
HCT VFR BLD CALC: 31.1 % — LOW (ref 39–50)
HGB BLD-MCNC: 10 G/DL — LOW (ref 13–17)
IMM GRANULOCYTES NFR BLD AUTO: 0.5 % — SIGNIFICANT CHANGE UP (ref 0–1.5)
LYMPHOCYTES # BLD AUTO: 28.7 % — SIGNIFICANT CHANGE UP (ref 13–44)
LYMPHOCYTES # BLD AUTO: 4.25 K/UL — HIGH (ref 1–3.3)
MCHC RBC-ENTMCNC: 31.4 PG — SIGNIFICANT CHANGE UP (ref 27–34)
MCHC RBC-ENTMCNC: 32.2 GM/DL — SIGNIFICANT CHANGE UP (ref 32–36)
MCV RBC AUTO: 97.8 FL — SIGNIFICANT CHANGE UP (ref 80–100)
MONOCYTES # BLD AUTO: 1.64 K/UL — HIGH (ref 0–0.9)
MONOCYTES NFR BLD AUTO: 11.1 % — SIGNIFICANT CHANGE UP (ref 2–14)
NEUTROPHILS # BLD AUTO: 8.8 K/UL — HIGH (ref 1.8–7.4)
NEUTROPHILS NFR BLD AUTO: 59.3 % — SIGNIFICANT CHANGE UP (ref 43–77)
NRBC # BLD: 0 /100 WBCS — SIGNIFICANT CHANGE UP (ref 0–0)
PLATELET # BLD AUTO: 187 K/UL — SIGNIFICANT CHANGE UP (ref 150–400)
POTASSIUM SERPL-MCNC: 4.2 MMOL/L — SIGNIFICANT CHANGE UP (ref 3.5–5.3)
POTASSIUM SERPL-SCNC: 4.2 MMOL/L — SIGNIFICANT CHANGE UP (ref 3.5–5.3)
PROT SERPL-MCNC: 6.5 G/DL — SIGNIFICANT CHANGE UP (ref 6–8.3)
RBC # BLD: 3.18 M/UL — LOW (ref 4.2–5.8)
RBC # FLD: 13.4 % — SIGNIFICANT CHANGE UP (ref 10.3–14.5)
SODIUM SERPL-SCNC: 141 MMOL/L — SIGNIFICANT CHANGE UP (ref 135–145)
WBC # BLD: 14.82 K/UL — HIGH (ref 3.8–10.5)
WBC # FLD AUTO: 14.82 K/UL — HIGH (ref 3.8–10.5)

## 2022-03-28 PROCEDURE — 71045 X-RAY EXAM CHEST 1 VIEW: CPT | Mod: 26

## 2022-03-28 RX ORDER — TICAGRELOR 90 MG/1
90 TABLET ORAL EVERY 12 HOURS
Refills: 0 | Status: DISCONTINUED | OUTPATIENT
Start: 2022-03-28 | End: 2022-03-29

## 2022-03-28 RX ADMIN — Medication 25 MILLIGRAM(S): at 05:06

## 2022-03-28 RX ADMIN — PANTOPRAZOLE SODIUM 40 MILLIGRAM(S): 20 TABLET, DELAYED RELEASE ORAL at 05:07

## 2022-03-28 RX ADMIN — Medication 25 MILLIGRAM(S): at 13:04

## 2022-03-28 RX ADMIN — Medication 650 MILLIGRAM(S): at 05:00

## 2022-03-28 RX ADMIN — OXYCODONE HYDROCHLORIDE 10 MILLIGRAM(S): 5 TABLET ORAL at 09:55

## 2022-03-28 RX ADMIN — OXYCODONE HYDROCHLORIDE 10 MILLIGRAM(S): 5 TABLET ORAL at 10:25

## 2022-03-28 RX ADMIN — GABAPENTIN 100 MILLIGRAM(S): 400 CAPSULE ORAL at 13:03

## 2022-03-28 RX ADMIN — AMLODIPINE BESYLATE 5 MILLIGRAM(S): 2.5 TABLET ORAL at 05:07

## 2022-03-28 RX ADMIN — SODIUM CHLORIDE 3 MILLILITER(S): 9 INJECTION INTRAMUSCULAR; INTRAVENOUS; SUBCUTANEOUS at 13:20

## 2022-03-28 RX ADMIN — SODIUM CHLORIDE 3 MILLILITER(S): 9 INJECTION INTRAMUSCULAR; INTRAVENOUS; SUBCUTANEOUS at 05:30

## 2022-03-28 RX ADMIN — ENOXAPARIN SODIUM 40 MILLIGRAM(S): 100 INJECTION SUBCUTANEOUS at 08:26

## 2022-03-28 RX ADMIN — ATORVASTATIN CALCIUM 80 MILLIGRAM(S): 80 TABLET, FILM COATED ORAL at 21:14

## 2022-03-28 RX ADMIN — OXYCODONE HYDROCHLORIDE 10 MILLIGRAM(S): 5 TABLET ORAL at 16:34

## 2022-03-28 RX ADMIN — SODIUM CHLORIDE 3 MILLILITER(S): 9 INJECTION INTRAMUSCULAR; INTRAVENOUS; SUBCUTANEOUS at 21:16

## 2022-03-28 RX ADMIN — GABAPENTIN 100 MILLIGRAM(S): 400 CAPSULE ORAL at 21:14

## 2022-03-28 RX ADMIN — Medication 500 MILLIGRAM(S): at 05:06

## 2022-03-28 RX ADMIN — CLOPIDOGREL BISULFATE 75 MILLIGRAM(S): 75 TABLET, FILM COATED ORAL at 13:04

## 2022-03-28 RX ADMIN — Medication 25 MILLIGRAM(S): at 21:15

## 2022-03-28 RX ADMIN — TICAGRELOR 90 MILLIGRAM(S): 90 TABLET ORAL at 16:34

## 2022-03-28 RX ADMIN — Medication 81 MILLIGRAM(S): at 13:04

## 2022-03-28 RX ADMIN — OXYCODONE HYDROCHLORIDE 10 MILLIGRAM(S): 5 TABLET ORAL at 17:04

## 2022-03-28 RX ADMIN — Medication 650 MILLIGRAM(S): at 13:34

## 2022-03-28 RX ADMIN — Medication 650 MILLIGRAM(S): at 06:00

## 2022-03-28 RX ADMIN — Medication 500 MILLIGRAM(S): at 16:34

## 2022-03-28 RX ADMIN — Medication 650 MILLIGRAM(S): at 13:04

## 2022-03-28 RX ADMIN — GABAPENTIN 100 MILLIGRAM(S): 400 CAPSULE ORAL at 05:06

## 2022-03-28 NOTE — PROGRESS NOTE ADULT - ASSESSMENT
Patient is a 46M with strong family Hx of CAD (both parents with CABG at 60's) HTN, smoker (1ppd x 20 years, quit 1 month ago), complained of worsening of leg pain while walking was diagnosed with PAD after abnormal VIN in Jan 2022, recently had to be treated with IV and PO antibiotics for Rt 5th toe non healing ulcer in Jan 2022 (pt followed with vascular dr Chino and LE was recommended).  Patient also endorsed intermittent CP for weeks and was evaluated by Dr Shea and underwent stress test on 3/9/22.  Stress test showed moderate to severe reversible perfusion in the apical cap, apical inferior, mid inferior wall, basal inferior wall suggestive of ischemia and referred for [facility] for further ischemic evaluation for which he presents today.  No implantable cardiac monitoring device.  CT Surgery Dr. Laguerre consulted for CABG evaluation given multi-vessel CAD.  He is now post op from C2L Left Radial performed on 3/25.      3/25 s/p CABG x2 [LIMA-LAD, L Rad = OM], V-Wires, Left pleural chest tube x 1, mediastinal chest tube x 1. No intraop blood products  Extubate in evening. ASA 81mg PO started    3/26 Medastinal chest tube removed.  Left pleural remains in for air leak off suction.  Started on plavix 75mg daily. Lovenox 40mg SQ QD started for DVT ppx. Metoprolol started for HTN/arrythmia ppx. Norvasc 5mg QD started for left radial graft ppx. Atorvastatin increased to 80mg HS.     3/27 Stable to transfer to floor.  Xray in AM.  Lopressor increased to 25mg TID.    3/27 left pleural chest tube and PW removed. pending follow up cxr. Sorbitol for constipation  3/28 VSS  -920cc/24hrs.  WBC 14.  anticipate dc Tuesday
Patient is a 46M with strong family Hx of CAD (both parents with CABG at 60's) HTN, smoker (1ppd x 20 years, quit 1 month ago), complained of worsening of leg pain while walking was diagnosed with PAD after abnormal VIN in Jan 2022, recently had to be treated with IV and PO antibiotics for Rt 5th toe non healing ulcer in Jan 2022 (pt followed with vascular dr Chino and LE was recommended).  Patient also endorsed intermittent CP for weeks and was evaluated by Dr Shea and underwent stress test on 3/9/22.  Stress test showed moderate to severe reversible perfusion in the apical cap, apical inferior, mid inferior wall, basal inferior wall suggestive of ischemia and referred for [facility] for further ischemic evaluation for which he presents today.  No implantable cardiac monitoring device.  CT Surgery Dr. Laguerre consulted for CABG evaluation given multi-vessel CAD.  He is now post op from C2L Left Radial performed on 3/25.      3/25 s/p CABG x2 [LIMA-LAD, L Rad = OM], V-Wires, Left pleural chest tube x 1, mediastinal chest tube x 1. No intraop blood products  Extubate in evening. ASA 81mg PO started    3/26 Medastinal chest tube removed.  Left pleural remains in for air leak off suction.  Started on plavix 75mg daily. Lovenox 40mg SQ QD started for DVT ppx. Metoprolol started for HTN/arrythmia ppx. Norvasc 5mg QD started for left radial graft ppx. Atorvastatin increased to 80mg HS.     3/27 Stable to transfer to floor.  Xray in AM.  Lopressor increased to 25mg TID.    
Patient is a 46M with strong family Hx of CAD (both parents with CABG at 60's) HTN, smoker (1ppd x 20 years, quit 1 month ago), complained of worsening of leg pain while walking was diagnosed with PAD after abnormal VIN in Jan 2022, recently had to be treated with IV and PO antibiotics for Rt 5th toe non healing ulcer in Jan 2022 (pt followed with vascular dr Chino and LE was recommended).  Patient also endorsed intermittent CP for weeks and was evaluated by Dr Shea and underwent stress test on 3/9/22.  Stress test showed moderate to severe reversible perfusion in the apical cap, apical inferior, mid inferior wall, basal inferior wall suggestive of ischemia and referred for [facility] for further ischemic evaluation for which he presents today.  No implantable cardiac monitoring device.  CT Surgery Dr. Laguerre consulted for CABG evaluation given multi-vessel CAD.  He is now post op from C2L Left Radial performed on 3/25.      3/25 s/p CABG x2 [LIMA-LAD, L Rad = OM], V-Wires, Left pleural chest tube x 1, mediastinal chest tube x 1. No intraop blood products  Extubate in evening. ASA 81mg PO started    3/26 Medastinal chest tube removed.  Left pleural remains in for air leak off suction.  Started on plavix 75mg daily. Lovenox 40mg SQ QD started for DVT ppx. Metoprolol started for HTN/arrythmia ppx. Norvasc 5mg QD started for left radial graft ppx. Atorvastatin increased to 80mg HS.     3/27 Stable to transfer to floor.  Xray in AM.  Lopressor increased to 25mg TID.    3/27 left pleural chest tube and PW removed. pending fo//ow up cxr. Sorbitol for constipation

## 2022-03-28 NOTE — PROGRESS NOTE ADULT - PROBLEM SELECTOR PLAN 1
3/25 s/p CABG x2 [LIMA-LAD, L Rad = OM], V-Wires  -Continue Lopressor 25mg TID for HTN/arrythmia prophylaxis  -Continue Norvasc 5mg PO QD for left radial graft prophylaxis  -Continue ASA/Plavix/Atorvastatin  -Continue Lovenox 40mg SQ Daily for dvt ppx   -Patient encouraged to ambulated in hallway as tolerated, OOB to chair for meals, cough and deep breathing exercises and incentive spirometry.  -anticipate DC Tuesday 3/25 s/p CABG x2 [LIMA-LAD, L Rad = OM], V-Wires  -Continue Lopressor 25mg TID for HTN/arrythmia prophylaxis  -Continue Norvasc 5mg PO QD for left radial graft prophylaxis  -Continue ASA/Atorvastatin  -Continue Lovenox 40mg SQ Daily for dvt ppx   -Patient encouraged to ambulated in hallway as tolerated, OOB to chair for meals, cough and deep breathing exercises and incentive spirometry.  DC palvix--start brilinta 90 bid  -anticipate DC Tuesday

## 2022-03-28 NOTE — PROGRESS NOTE ADULT - SUBJECTIVE AND OBJECTIVE BOX
S:  doing well.  no sob.  pain controlled    Telemetry:  SR 60-80    Vital Signs Last 24 Hrs  T(C): 37 (22 @ 12:19), Max: 37.5 (22 @ 19:55)  T(F): 98.6 (22 @ 12:19), Max: 99.5 (22 @ 19:55)  HR: 73 (22 @ 12:19) (69 - 84)  BP: 107/53 (22 @ 12:19) (107/53 - 132/76)  RR: 18 (22 @ 12:19) (18 - 18)  SpO2: 96% (22 @ 12:19) (92% - 96%)                    @ 07:01  -   @ 07:00  --------------------------------------------------------  IN: 1230 mL / OUT: 2162 mL / NET: -932 mL     @ 07:01  -   @ 13:28  --------------------------------------------------------  IN: 480 mL / OUT: 0 mL / NET: 480 mL          Daily     Daily Weight in k.9 (28 Mar 2022 08:54)        CAPILLARY BLOOD GLUCOSE      POCT Blood Glucose.: 90 mg/dL (28 Mar 2022 11:44)  POCT Blood Glucose.: 98 mg/dL (28 Mar 2022 07:45)  POCT Blood Glucose.: 120 mg/dL (27 Mar 2022 21:02)  POCT Blood Glucose.: 131 mg/dL (27 Mar 2022 16:41)          Drains:     MS         [  ] Drainage:                 L Pleural  [  ]  Drainage:                R Pleural  [  ]  Drainage:    Pacing Wires        [  ]   Settings:                                  Isolated  [  ]    Coumadin    [ ] YES          [ x ]      NO         Reason:                                 10.0   14.82 )-----------( 187      ( 28 Mar 2022 05:11 )             31.1       -    141  |  103  |  22  ----------------------------<  104<H>  4.2   |  25  |  0.87    Ca    9.4      28 Mar 2022 05:11  Phos  3.3     -  Mg     2.3         TPro  6.5  /  Alb  4.1  /  TBili  0.5  /  DBili  x   /  AST  21  /  ALT  31  /  AlkPhos  47            PHYSICAL EXAM:    Neurology: alert and oriented x 3, nonfocal, no gross deficits    CV :  S1S2 heard RRR    Sternal Wound :  CDI , Stable    Lungs:  clear to ausc.  no w/r/r    Abdomen: soft, nontender, nondistended, positive bowel sounds,+ bowel movement             Extremities:  no edema.  left radial site CDI             acetaminophen     Tablet .. 650 milliGRAM(s) Oral every 6 hours  acetaminophen     Tablet .. 650 milliGRAM(s) Oral every 6 hours PRN  amLODIPine   Tablet 5 milliGRAM(s) Oral daily  ascorbic acid 500 milliGRAM(s) Oral two times a day  aspirin enteric coated 81 milliGRAM(s) Oral daily  atorvastatin 80 milliGRAM(s) Oral at bedtime  bisacodyl Suppository 10 milliGRAM(s) Rectal once  clopidogrel Tablet 75 milliGRAM(s) Oral daily  dextrose 50% Injectable 50 milliLiter(s) IV Push every 15 minutes  dextrose 50% Injectable 25 milliLiter(s) IV Push every 15 minutes  enoxaparin Injectable 40 milliGRAM(s) SubCutaneous every 24 hours  gabapentin 100 milliGRAM(s) Oral every 8 hours  insulin lispro (ADMELOG) corrective regimen sliding scale   SubCutaneous three times a day before meals  insulin lispro (ADMELOG) corrective regimen sliding scale   SubCutaneous at bedtime  metoprolol tartrate 25 milliGRAM(s) Oral every 8 hours  oxyCODONE    IR 5 milliGRAM(s) Oral every 4 hours PRN  oxyCODONE    IR 10 milliGRAM(s) Oral every 4 hours PRN  pantoprazole    Tablet 40 milliGRAM(s) Oral before breakfast  polyethylene glycol 3350 17 Gram(s) Oral daily  potassium chloride  10 mEq/50 mL IVPB 10 milliEquivalent(s) IV Intermittent every 1 hour  potassium chloride  10 mEq/50 mL IVPB 10 milliEquivalent(s) IV Intermittent every 1 hour  potassium chloride  10 mEq/50 mL IVPB 10 milliEquivalent(s) IV Intermittent every 1 hour  senna 2 Tablet(s) Oral at bedtime  sodium chloride 0.9% lock flush 3 milliLiter(s) IV Push every 8 hours                              Physical Therapy Rec:   Home  [ x ]   Home w/ PT  [  ]  Rehab  [  ]    Discussed with Cardiothoracic Team at AM rounds.

## 2022-03-29 ENCOUNTER — APPOINTMENT (OUTPATIENT)
Dept: CARDIOTHORACIC SURGERY | Facility: HOSPITAL | Age: 47
End: 2022-03-29

## 2022-03-29 ENCOUNTER — TRANSCRIPTION ENCOUNTER (OUTPATIENT)
Age: 47
End: 2022-03-29

## 2022-03-29 VITALS — HEART RATE: 87 BPM | SYSTOLIC BLOOD PRESSURE: 125 MMHG | DIASTOLIC BLOOD PRESSURE: 73 MMHG

## 2022-03-29 LAB
ANION GAP SERPL CALC-SCNC: 11 MMOL/L — SIGNIFICANT CHANGE UP (ref 5–17)
BUN SERPL-MCNC: 18 MG/DL — SIGNIFICANT CHANGE UP (ref 7–23)
CALCIUM SERPL-MCNC: 9.2 MG/DL — SIGNIFICANT CHANGE UP (ref 8.4–10.5)
CHLORIDE SERPL-SCNC: 105 MMOL/L — SIGNIFICANT CHANGE UP (ref 96–108)
CO2 SERPL-SCNC: 25 MMOL/L — SIGNIFICANT CHANGE UP (ref 22–31)
CREAT SERPL-MCNC: 0.87 MG/DL — SIGNIFICANT CHANGE UP (ref 0.5–1.3)
EGFR: 106 ML/MIN/1.73M2 — SIGNIFICANT CHANGE UP
GLUCOSE BLDC GLUCOMTR-MCNC: 97 MG/DL — SIGNIFICANT CHANGE UP (ref 70–99)
GLUCOSE SERPL-MCNC: 105 MG/DL — HIGH (ref 70–99)
HCT VFR BLD CALC: 30.5 % — LOW (ref 39–50)
HGB BLD-MCNC: 10.2 G/DL — LOW (ref 13–17)
MCHC RBC-ENTMCNC: 31.7 PG — SIGNIFICANT CHANGE UP (ref 27–34)
MCHC RBC-ENTMCNC: 33.4 GM/DL — SIGNIFICANT CHANGE UP (ref 32–36)
MCV RBC AUTO: 94.7 FL — SIGNIFICANT CHANGE UP (ref 80–100)
NRBC # BLD: 0 /100 WBCS — SIGNIFICANT CHANGE UP (ref 0–0)
PLATELET # BLD AUTO: 220 K/UL — SIGNIFICANT CHANGE UP (ref 150–400)
POTASSIUM SERPL-MCNC: 4.1 MMOL/L — SIGNIFICANT CHANGE UP (ref 3.5–5.3)
POTASSIUM SERPL-SCNC: 4.1 MMOL/L — SIGNIFICANT CHANGE UP (ref 3.5–5.3)
RBC # BLD: 3.22 M/UL — LOW (ref 4.2–5.8)
RBC # FLD: 13.2 % — SIGNIFICANT CHANGE UP (ref 10.3–14.5)
SODIUM SERPL-SCNC: 141 MMOL/L — SIGNIFICANT CHANGE UP (ref 135–145)
WBC # BLD: 10.47 K/UL — SIGNIFICANT CHANGE UP (ref 3.8–10.5)
WBC # FLD AUTO: 10.47 K/UL — SIGNIFICANT CHANGE UP (ref 3.8–10.5)

## 2022-03-29 RX ORDER — ATORVASTATIN CALCIUM 80 MG/1
1 TABLET, FILM COATED ORAL
Qty: 30 | Refills: 0
Start: 2022-03-29 | End: 2022-04-27

## 2022-03-29 RX ORDER — OXYCODONE HYDROCHLORIDE 5 MG/1
1 TABLET ORAL
Qty: 30 | Refills: 0
Start: 2022-03-29 | End: 2022-04-02

## 2022-03-29 RX ORDER — ACETAMINOPHEN 500 MG
2 TABLET ORAL
Qty: 0 | Refills: 0 | DISCHARGE
Start: 2022-03-29

## 2022-03-29 RX ORDER — TICAGRELOR 90 MG/1
1 TABLET ORAL
Qty: 60 | Refills: 0
Start: 2022-03-29 | End: 2022-04-27

## 2022-03-29 RX ORDER — RAMIPRIL 5 MG
1 CAPSULE ORAL
Qty: 0 | Refills: 0 | DISCHARGE

## 2022-03-29 RX ORDER — METOPROLOL TARTRATE 50 MG
1 TABLET ORAL
Qty: 0 | Refills: 0 | DISCHARGE

## 2022-03-29 RX ORDER — POLYETHYLENE GLYCOL 3350 17 G/17G
17 POWDER, FOR SOLUTION ORAL
Qty: 0 | Refills: 0 | DISCHARGE
Start: 2022-03-29

## 2022-03-29 RX ORDER — METOPROLOL TARTRATE 50 MG
50 TABLET ORAL DAILY
Refills: 0 | Status: CANCELLED | OUTPATIENT
Start: 2022-03-30 | End: 2022-03-29

## 2022-03-29 RX ORDER — METOPROLOL TARTRATE 50 MG
25 TABLET ORAL ONCE
Refills: 0 | Status: COMPLETED | OUTPATIENT
Start: 2022-03-29 | End: 2022-03-29

## 2022-03-29 RX ORDER — ATORVASTATIN CALCIUM 80 MG/1
1 TABLET, FILM COATED ORAL
Qty: 0 | Refills: 0 | DISCHARGE

## 2022-03-29 RX ADMIN — Medication 25 MILLIGRAM(S): at 05:03

## 2022-03-29 RX ADMIN — Medication 25 MILLIGRAM(S): at 10:40

## 2022-03-29 RX ADMIN — GABAPENTIN 100 MILLIGRAM(S): 400 CAPSULE ORAL at 05:03

## 2022-03-29 RX ADMIN — POLYETHYLENE GLYCOL 3350 17 GRAM(S): 17 POWDER, FOR SOLUTION ORAL at 10:40

## 2022-03-29 RX ADMIN — ENOXAPARIN SODIUM 40 MILLIGRAM(S): 100 INJECTION SUBCUTANEOUS at 07:50

## 2022-03-29 RX ADMIN — Medication 500 MILLIGRAM(S): at 05:03

## 2022-03-29 RX ADMIN — Medication 81 MILLIGRAM(S): at 10:40

## 2022-03-29 RX ADMIN — OXYCODONE HYDROCHLORIDE 5 MILLIGRAM(S): 5 TABLET ORAL at 05:30

## 2022-03-29 RX ADMIN — AMLODIPINE BESYLATE 5 MILLIGRAM(S): 2.5 TABLET ORAL at 05:03

## 2022-03-29 RX ADMIN — SODIUM CHLORIDE 3 MILLILITER(S): 9 INJECTION INTRAMUSCULAR; INTRAVENOUS; SUBCUTANEOUS at 05:24

## 2022-03-29 RX ADMIN — OXYCODONE HYDROCHLORIDE 5 MILLIGRAM(S): 5 TABLET ORAL at 10:40

## 2022-03-29 RX ADMIN — PANTOPRAZOLE SODIUM 40 MILLIGRAM(S): 20 TABLET, DELAYED RELEASE ORAL at 05:03

## 2022-03-29 RX ADMIN — TICAGRELOR 90 MILLIGRAM(S): 90 TABLET ORAL at 05:05

## 2022-03-29 RX ADMIN — OXYCODONE HYDROCHLORIDE 5 MILLIGRAM(S): 5 TABLET ORAL at 05:04

## 2022-03-29 NOTE — DISCHARGE NOTE PROVIDER - HOSPITAL COURSE
Patient is a 46M with strong family Hx of CAD (both parents with CABG at 60's) HTN, smoker (1ppd x 20 years, quit 1 month ago), complained of worsening of leg pain while walking was diagnosed with PAD after abnormal VIN in Jan 2022, recently had to be treated with IV and PO antibiotics for Rt 5th toe non healing ulcer in Jan 2022 (pt followed with vascular dr Chino and LE was recommended).  Patient also endorsed intermittent CP for weeks and was evaluated by Dr Shea and underwent stress test on 3/9/22.  Stress test showed moderate to severe reversible perfusion in the apical cap, apical inferior, mid inferior wall, basal inferior wall suggestive of ischemia and referred for [facility] for further ischemic evaluation for which he presents today.  No implantable cardiac monitoring device.  CT Surgery Dr. Laguerre consulted for CABG evaluation given multi-vessel CAD.  He is now post op from C2L Left Radial performed on 3/25.      3/25 s/p CABG x2 [LIMA-LAD, L Rad = OM], V-Wires, Left pleural chest tube x 1, mediastinal chest tube x 1. No intraop blood products  Extubate in evening. ASA 81mg PO started    3/26 Medastinal chest tube removed.  Left pleural remains in for air leak off suction.  Started on plavix 75mg daily. Lovenox 40mg SQ QD started for DVT ppx. Metoprolol started for HTN/arrythmia ppx. Norvasc 5mg QD started for left radial graft ppx. Atorvastatin increased to 80mg HS.     3/27 Stable to transfer to floor.  Xray in AM.  Lopressor increased to 25mg TID.    3/27 left pleural chest tube and PW removed. pending follow up cxr. Sorbitol for constipation  3/28 VSS  -920cc/24hrs.  WBC 14.  anticipate dc Tuesday  3/29   vss   dc home

## 2022-03-29 NOTE — DISCHARGE NOTE PROVIDER - CARE PROVIDER_API CALL
Spencer Laguerre)  Surgery; Thoracic Surgery  [address] [place] [address]  Phone: [phone]  Fax: [phone]  Follow Up Time:

## 2022-03-29 NOTE — DISCHARGE NOTE PROVIDER - NSDCCPCAREPLAN_GEN_ALL_CORE_FT
PRINCIPAL DISCHARGE DIAGNOSIS  Diagnosis: CAD (coronary artery disease)  Assessment and Plan of Treatment: sp   CABG

## 2022-03-29 NOTE — DISCHARGE NOTE NURSING/CASE MANAGEMENT/SOCIAL WORK - NSDCPEFALRISK_GEN_ALL_CORE
For information on Fall & Injury Prevention, visit: [email or web address] OR  [email or web address] OR  https://www.cdc.gov/steadi/patient.html

## 2022-03-29 NOTE — DISCHARGE NOTE NURSING/CASE MANAGEMENT/SOCIAL WORK - PATIENT PORTAL LINK FT
You can access the FollowMyHealth Patient Portal offered by [facility] by registering at the following website: [email or web address]. By joining [facility]’s FollowMyHealth portal, you will also be able to view your health information using other applications (apps) compatible with our system.

## 2022-03-29 NOTE — DISCHARGE NOTE NURSING/CASE MANAGEMENT/SOCIAL WORK - NSDCFUADDAPPT_GEN_ALL_CORE_FT
see Dr Laguerre   April 13th    1:15 pm    Please follow up with your primary doctor at [place] in 4 Weeks.

## 2022-03-29 NOTE — DISCHARGE NOTE PROVIDER - NSDCMRMEDTOKEN_GEN_ALL_CORE_FT
Aspirin Enteric Coated 81 mg oral delayed release tablet: 1 tab(s) orally once a day  atorvastatin 40 mg oral tablet: 1 tab(s) orally once a day  hydroCHLOROthiazide 25 mg oral tablet: 1 tab(s) orally once a day  ramipril 10 mg oral capsule: 1 cap(s) orally once a day  Pt takes Piramil 10mg (from [facility])    Toprol-XL 25 mg oral tablet, extended release: 1 tab(s) orally once a day  Takes Betaloc 23.75mg (from [facility])   acetaminophen: 2 tab(s) orally every 6 hours, As Needed  amLODIPine 5 mg oral tablet: 1 tab(s) orally once a day  Aspirin Enteric Coated 81 mg oral delayed release tablet: 1 tab(s) orally once a day  atorvastatin 80 mg oral tablet: 1 tab(s) orally once a day (at bedtime)  metoprolol succinate 50 mg oral tablet, extended release: 1 tab(s) orally once a day  oxyCODONE 5 mg oral tablet: 1 tab(s) orally every 4 hours, As needed, Moderate Pain (4 - 6) MDD:6  2 tabs for severe pain  polyethylene glycol 3350 oral powder for reconstitution: 17 gram(s) orally once a day  ticagrelor 90 mg oral tablet: 1 tab(s) orally every 12 hours

## 2022-03-29 NOTE — DISCHARGE NOTE PROVIDER - NSDCFUSCHEDAPPT_GEN_ALL_CORE_FT
JENIFFER HE ; 03/29/2022 ; NPP [facility]  JENIFFER Low ; 04/06/2022 ; NPP [facility]  JENIFFER Low ; 05/24/2022 ; NPP Ultrasound [address]  JENIFFER HE ; 06/06/2022 ; NPP Vascular [address] JENIFFER HE ; 03/29/2022 ; NPP [place]  JENIFFER Low ; 04/06/2022 ; NPP [place]  JENIFFER Low ; 04/13/2022 ; NPP CT Surg [address] JENIFFER Branch ; 05/24/2022 ; NPP Ultrasound [address] JENIFFER Hoang ; 06/06/2022 ; NPP Vascular [address]

## 2022-03-30 RX ORDER — ATORVASTATIN CALCIUM 40 MG/1
40 TABLET, FILM COATED ORAL
Qty: 1 | Refills: 0 | Status: DISCONTINUED | COMMUNITY
Start: 2022-03-04 | End: 2022-03-30

## 2022-03-30 RX ORDER — METOPROLOL TARTRATE 50 MG
1 TABLET ORAL
Qty: 30 | Refills: 0
Start: 2022-03-30 | End: 2022-04-28

## 2022-03-30 RX ORDER — AMLODIPINE BESYLATE 2.5 MG/1
1 TABLET ORAL
Qty: 30 | Refills: 0 | DISCHARGE
Start: 2022-03-30 | End: 2022-04-28

## 2022-03-30 RX ORDER — METOPROLOL TARTRATE 25 MG/1
25 TABLET, FILM COATED ORAL DAILY
Qty: 30 | Refills: 2 | Status: DISCONTINUED | COMMUNITY
Start: 2022-02-11 | End: 2022-03-30

## 2022-03-30 RX ORDER — AMLODIPINE BESYLATE 2.5 MG/1
1 TABLET ORAL
Qty: 30 | Refills: 0
Start: 2022-03-30 | End: 2022-04-28

## 2022-03-30 RX ORDER — RAMIPRIL 10 MG/1
10 CAPSULE ORAL DAILY
Qty: 1 | Refills: 0 | Status: DISCONTINUED | COMMUNITY
Start: 2022-02-11 | End: 2022-03-30

## 2022-04-01 ENCOUNTER — APPOINTMENT (OUTPATIENT)
Dept: CARE COORDINATION | Facility: HOME HEALTH | Age: 47
End: 2022-04-01
Payer: SELF-PAY

## 2022-04-01 VITALS
SYSTOLIC BLOOD PRESSURE: 130 MMHG | OXYGEN SATURATION: 98 % | DIASTOLIC BLOOD PRESSURE: 70 MMHG | RESPIRATION RATE: 14 BRPM | HEART RATE: 80 BPM

## 2022-04-01 PROCEDURE — 99024 POSTOP FOLLOW-UP VISIT: CPT

## 2022-04-01 NOTE — HISTORY OF PRESENT ILLNESS
[FreeTextEntry1] : This is a 46M with strong family Hx of CAD (both parents with CABG at 60's), HTN, smoker (1ppd x 20 years, quit 1 month ago), complained of worsening of leg pain while walking was diagnosed with PAD after abnormal VIN in Jan 2022, recently had to be treated with IV and PO antibiotics for Rt 5th toe non healing ulcer in Jan 2022 (pt followed with vascular dr Chino).Patient also endorsed intermittent CP for weeks and was  evaluated by Dr Shea and underwent stress test on 3/9/22. Stress test showed moderate to severe reversible perfusion in the apical cap, apical inferior, mid inferior wall, basal inferior wall suggestive of ischemia and referred for [facility]. CT Surgery Dr. Laguerre consulted for CABG evaluation given multi-vessel CAD seen on C. He is s/p CABGx2 [LIMA-LAD, L Rad = OM] performed on 3/25. Post-op course uncomplicated. Pt DC'd home on 3/29. Now seen at home today (4/1) recovering well w/ no complaints besides some pain which is relieved by Oxycodone at night. Emotional support and education provided. All questions answered.\par

## 2022-04-01 NOTE — PHYSICAL EXAM
[Sclera] : the sclera and conjunctiva were normal [Neck Appearance] : the appearance of the neck was normal [] : no respiratory distress [Exaggerated Use Of Accessory Muscles For Inspiration] : no accessory muscle use [Respiration, Rhythm And Depth] : normal respiratory rhythm and effort [Auscultation Breath Sounds / Voice Sounds] : lungs were clear to auscultation bilaterally [Apical Impulse] : the apical impulse was normal [Heart Rate And Rhythm] : heart rate was normal and rhythm regular [Heart Sounds] : normal S1 and S2 [Heart Sounds Gallop] : no gallops [Murmurs] : no murmurs [Heart Sounds Pericardial Friction Rub] : no pericardial rub [FreeTextEntry1] : MSI, CT sites and graft sites without erythema, drainage or warmth, with edges well approximated.  Sternum stable. BL LE - no edema. [Examination Of The Chest] : the chest was normal in appearance [Chest Visual Inspection Thoracic Asymmetry] : no chest asymmetry [Diminished Respiratory Excursion] : normal chest expansion [Bowel Sounds] : normal bowel sounds [Abdomen Tenderness] : non-tender [Abdomen Soft] : soft [Abnormal Walk] : normal gait [Skin Color & Pigmentation] : normal skin color and pigmentation [No Focal Deficits] : no focal deficits [Oriented To Time, Place, And Person] : oriented to person, place, and time [Impaired Insight] : insight and judgment were intact [Affect] : the affect was normal [Mood] : the mood was normal

## 2022-04-01 NOTE — REASON FOR VISIT
[Post Hospitalization] : a post hospitalization visit [Other: _____] : [unfilled] [FreeTextEntry1] : FOLLOW YOUR HEART - Transitional Care Management Program - [facility]

## 2022-04-01 NOTE — ASSESSMENT
[FreeTextEntry1] : This is a 47 y/o M whom is seen at home s/p CABG x3 recovering well. He has good support of his daughter. He had no complaints besides some incisional pain which is relieved w/ Oxycodone. He did not weight himself today b/c the scale he ordered from [employer] did not arrive until after lunch time. He will wait to weigh himself tmrw AM and is aware to report wt gain to [facility] team.

## 2022-04-05 ENCOUNTER — OUTPATIENT (OUTPATIENT)
Dept: OUTPATIENT SERVICES | Facility: HOSPITAL | Age: 47
LOS: 1 days | End: 2022-04-05
Payer: SELF-PAY

## 2022-04-05 ENCOUNTER — APPOINTMENT (OUTPATIENT)
Dept: FAMILY MEDICINE | Facility: HOSPITAL | Age: 47
End: 2022-04-05

## 2022-04-05 VITALS
HEART RATE: 86 BPM | DIASTOLIC BLOOD PRESSURE: 80 MMHG | RESPIRATION RATE: 14 BRPM | TEMPERATURE: 97.3 F | BODY MASS INDEX: 25.43 KG/M2 | HEIGHT: 67 IN | WEIGHT: 162 LBS | SYSTOLIC BLOOD PRESSURE: 127 MMHG | OXYGEN SATURATION: 99 %

## 2022-04-05 DIAGNOSIS — Z00.00 ENCOUNTER FOR GENERAL ADULT MEDICAL EXAMINATION WITHOUT ABNORMAL FINDINGS: ICD-10-CM

## 2022-04-05 PROCEDURE — G0463: CPT

## 2022-04-06 ENCOUNTER — APPOINTMENT (OUTPATIENT)
Dept: FAMILY MEDICINE | Facility: HOSPITAL | Age: 47
End: 2022-04-06

## 2022-04-08 DIAGNOSIS — I25.10 ATHEROSCLEROTIC HEART DISEASE OF NATIVE CORONARY ARTERY WITHOUT ANGINA PECTORIS: ICD-10-CM

## 2022-04-08 DIAGNOSIS — Z95.1 PRESENCE OF AORTOCORONARY BYPASS GRAFT: ICD-10-CM

## 2022-04-13 ENCOUNTER — APPOINTMENT (OUTPATIENT)
Dept: CARDIOTHORACIC SURGERY | Facility: CLINIC | Age: 47
End: 2022-04-13
Payer: COMMERCIAL

## 2022-04-13 VITALS
BODY MASS INDEX: 25.26 KG/M2 | HEIGHT: 67 IN | TEMPERATURE: 98.6 F | OXYGEN SATURATION: 100 % | WEIGHT: 160.94 LBS | RESPIRATION RATE: 16 BRPM | DIASTOLIC BLOOD PRESSURE: 89 MMHG | HEART RATE: 83 BPM | SYSTOLIC BLOOD PRESSURE: 154 MMHG

## 2022-04-13 PROCEDURE — 99024 POSTOP FOLLOW-UP VISIT: CPT

## 2022-04-20 ENCOUNTER — OUTPATIENT (OUTPATIENT)
Dept: OUTPATIENT SERVICES | Facility: HOSPITAL | Age: 47
LOS: 1 days | End: 2022-04-20
Payer: SELF-PAY

## 2022-04-20 ENCOUNTER — NON-APPOINTMENT (OUTPATIENT)
Age: 47
End: 2022-04-20

## 2022-04-20 ENCOUNTER — APPOINTMENT (OUTPATIENT)
Dept: CARDIOLOGY | Facility: HOSPITAL | Age: 47
End: 2022-04-20

## 2022-04-20 VITALS
DIASTOLIC BLOOD PRESSURE: 84 MMHG | BODY MASS INDEX: 25.11 KG/M2 | HEART RATE: 75 BPM | WEIGHT: 160 LBS | HEIGHT: 67 IN | OXYGEN SATURATION: 100 % | SYSTOLIC BLOOD PRESSURE: 147 MMHG

## 2022-04-20 DIAGNOSIS — I25.10 ATHEROSCLEROTIC HEART DISEASE OF NATIVE CORONARY ARTERY WITHOUT ANGINA PECTORIS: ICD-10-CM

## 2022-04-20 PROCEDURE — G0463: CPT

## 2022-04-20 PROCEDURE — 93005 ELECTROCARDIOGRAM TRACING: CPT

## 2022-04-21 DIAGNOSIS — I10 ESSENTIAL (PRIMARY) HYPERTENSION: ICD-10-CM

## 2022-04-21 DIAGNOSIS — Z95.1 PRESENCE OF AORTOCORONARY BYPASS GRAFT: ICD-10-CM

## 2022-04-27 ENCOUNTER — TRANSCRIPTION ENCOUNTER (OUTPATIENT)
Age: 47
End: 2022-04-27

## 2022-04-27 ENCOUNTER — APPOINTMENT (OUTPATIENT)
Dept: CARDIOTHORACIC SURGERY | Facility: CLINIC | Age: 47
End: 2022-04-27
Payer: MEDICAID

## 2022-04-27 VITALS
RESPIRATION RATE: 16 BRPM | SYSTOLIC BLOOD PRESSURE: 141 MMHG | BODY MASS INDEX: 24.91 KG/M2 | HEIGHT: 67 IN | HEART RATE: 74 BPM | OXYGEN SATURATION: 100 % | DIASTOLIC BLOOD PRESSURE: 81 MMHG | WEIGHT: 158.73 LBS | TEMPERATURE: 98.1 F

## 2022-04-27 PROCEDURE — 99024 POSTOP FOLLOW-UP VISIT: CPT

## 2022-04-27 RX ORDER — POLYETHYLENE GLYCOL 3350 17 G/17G
17 POWDER, FOR SOLUTION ORAL
Refills: 0 | Status: COMPLETED | COMMUNITY
Start: 2022-03-30 | End: 2022-04-27

## 2022-04-27 RX ORDER — OXYCODONE 5 MG/1
5 TABLET ORAL EVERY 4 HOURS
Refills: 0 | Status: COMPLETED | COMMUNITY
Start: 2022-03-30 | End: 2022-04-27

## 2022-05-02 RX ORDER — TICAGRELOR 90 MG/1
90 TABLET ORAL TWICE DAILY
Qty: 180 | Refills: 0 | Status: COMPLETED | COMMUNITY
Start: 2022-03-30 | End: 2022-05-02

## 2022-05-02 NOTE — REASON FOR VISIT
[FreeTextEntry1] : 46 M with CAD s/p CABG x 2 (LIMA to LAD, L Rad to OM), preserved EF, PAD, HTN, presents to establish care with a cardiologist. He states he is feeling well, he has no chest pain, SOB  or other symptoms. He is able to do his daily activities and work without any issue. He has persistent claudication of his right leg which has not changed. He has appointments for vascular U/S and with the vascular team.

## 2022-05-02 NOTE — DISCUSSION/SUMMARY
[FreeTextEntry1] : 46 M with CAD s/p CABG x 2 (LIMA to LAD, L Rad to OM), preserved EF, PAD, HTN, presents to establish care with a cardiologist. \par \par CAD: s/p CABG:\par -EF 65% pre CABG, will repeat with next visit \par -c/w Aspirin 81mg and brillinta 90mg BID (DAPT for at least 6 months), metoprolol succ 50mg daily, atorvastatin 80mg daily\par -last a1c 6 and  in 2/2022, will recheck in two months\par \par PAD: with claudication\par -scheduled for imaging and visit with vascular in June.\par -Follow-up Vascular evaluation.  \par \par Ursula Araujo MD

## 2022-05-19 ENCOUNTER — RX RENEWAL (OUTPATIENT)
Age: 47
End: 2022-05-19

## 2022-05-20 ENCOUNTER — APPOINTMENT (OUTPATIENT)
Dept: CARDIOTHORACIC SURGERY | Facility: CLINIC | Age: 47
End: 2022-05-20
Payer: MEDICAID

## 2022-05-20 PROCEDURE — 97802 MEDICAL NUTRITION INDIV IN: CPT

## 2022-05-24 ENCOUNTER — RESULT REVIEW (OUTPATIENT)
Age: 47
End: 2022-05-24

## 2022-05-24 ENCOUNTER — APPOINTMENT (OUTPATIENT)
Dept: ULTRASOUND IMAGING | Facility: HOSPITAL | Age: 47
End: 2022-05-24
Payer: COMMERCIAL

## 2022-05-24 ENCOUNTER — OUTPATIENT (OUTPATIENT)
Dept: OUTPATIENT SERVICES | Facility: HOSPITAL | Age: 47
LOS: 1 days | End: 2022-05-24
Payer: SELF-PAY

## 2022-05-24 DIAGNOSIS — I73.9 PERIPHERAL VASCULAR DISEASE, UNSPECIFIED: ICD-10-CM

## 2022-05-24 PROCEDURE — 93923 UPR/LXTR ART STDY 3+ LVLS: CPT | Mod: 26

## 2022-05-24 PROCEDURE — 93923 UPR/LXTR ART STDY 3+ LVLS: CPT

## 2022-05-30 PROCEDURE — P9041: CPT

## 2022-05-30 PROCEDURE — 82553 CREATINE MB FRACTION: CPT

## 2022-05-30 PROCEDURE — 85027 COMPLETE CBC AUTOMATED: CPT

## 2022-05-30 PROCEDURE — U0003: CPT

## 2022-05-30 PROCEDURE — 93306 TTE W/DOPPLER COMPLETE: CPT

## 2022-05-30 PROCEDURE — 93454 CORONARY ARTERY ANGIO S&I: CPT

## 2022-05-30 PROCEDURE — 85384 FIBRINOGEN ACTIVITY: CPT

## 2022-05-30 PROCEDURE — 99152 MOD SED SAME PHYS/QHP 5/>YRS: CPT

## 2022-05-30 PROCEDURE — 83735 ASSAY OF MAGNESIUM: CPT

## 2022-05-30 PROCEDURE — 93572 IV DOP VEL&/PRESS C FLO EA: CPT | Mod: LC

## 2022-05-30 PROCEDURE — 85025 COMPLETE CBC W/AUTO DIFF WBC: CPT

## 2022-05-30 PROCEDURE — 80053 COMPREHEN METABOLIC PANEL: CPT

## 2022-05-30 PROCEDURE — 82550 ASSAY OF CK (CPK): CPT

## 2022-05-30 PROCEDURE — 85018 HEMOGLOBIN: CPT

## 2022-05-30 PROCEDURE — U0005: CPT

## 2022-05-30 PROCEDURE — 71045 X-RAY EXAM CHEST 1 VIEW: CPT

## 2022-05-30 PROCEDURE — 87640 STAPH A DNA AMP PROBE: CPT

## 2022-05-30 PROCEDURE — 83036 HEMOGLOBIN GLYCOSYLATED A1C: CPT

## 2022-05-30 PROCEDURE — 93005 ELECTROCARDIOGRAM TRACING: CPT

## 2022-05-30 PROCEDURE — 83605 ASSAY OF LACTIC ACID: CPT

## 2022-05-30 PROCEDURE — 81003 URINALYSIS AUTO W/O SCOPE: CPT

## 2022-05-30 PROCEDURE — 97530 THERAPEUTIC ACTIVITIES: CPT

## 2022-05-30 PROCEDURE — C1751: CPT

## 2022-05-30 PROCEDURE — 85576 BLOOD PLATELET AGGREGATION: CPT

## 2022-05-30 PROCEDURE — 82330 ASSAY OF CALCIUM: CPT

## 2022-05-30 PROCEDURE — 84436 ASSAY OF TOTAL THYROXINE: CPT

## 2022-05-30 PROCEDURE — 85014 HEMATOCRIT: CPT

## 2022-05-30 PROCEDURE — 85730 THROMBOPLASTIN TIME PARTIAL: CPT

## 2022-05-30 PROCEDURE — C1894: CPT

## 2022-05-30 PROCEDURE — 97116 GAIT TRAINING THERAPY: CPT

## 2022-05-30 PROCEDURE — 84100 ASSAY OF PHOSPHORUS: CPT

## 2022-05-30 PROCEDURE — 87641 MR-STAPH DNA AMP PROBE: CPT

## 2022-05-30 PROCEDURE — 84484 ASSAY OF TROPONIN QUANT: CPT

## 2022-05-30 PROCEDURE — 93571 IV DOP VEL&/PRESS C FLO 1ST: CPT | Mod: LD

## 2022-05-30 PROCEDURE — C1769: CPT

## 2022-05-30 PROCEDURE — 86900 BLOOD TYPING SEROLOGIC ABO: CPT

## 2022-05-30 PROCEDURE — 82803 BLOOD GASES ANY COMBINATION: CPT

## 2022-05-30 PROCEDURE — 86901 BLOOD TYPING SEROLOGIC RH(D): CPT

## 2022-05-30 PROCEDURE — P9047: CPT

## 2022-05-30 PROCEDURE — 84295 ASSAY OF SERUM SODIUM: CPT

## 2022-05-30 PROCEDURE — 86891 AUTOLOGOUS BLOOD OP SALVAGE: CPT

## 2022-05-30 PROCEDURE — P9045: CPT

## 2022-05-30 PROCEDURE — 82565 ASSAY OF CREATININE: CPT

## 2022-05-30 PROCEDURE — 84480 ASSAY TRIIODOTHYRONINE (T3): CPT

## 2022-05-30 PROCEDURE — 84443 ASSAY THYROID STIM HORMONE: CPT

## 2022-05-30 PROCEDURE — 86850 RBC ANTIBODY SCREEN: CPT

## 2022-05-30 PROCEDURE — C1887: CPT

## 2022-05-30 PROCEDURE — 82435 ASSAY OF BLOOD CHLORIDE: CPT

## 2022-05-30 PROCEDURE — 83880 ASSAY OF NATRIURETIC PEPTIDE: CPT

## 2022-05-30 PROCEDURE — 97162 PT EVAL MOD COMPLEX 30 MIN: CPT

## 2022-05-30 PROCEDURE — 85610 PROTHROMBIN TIME: CPT

## 2022-05-30 PROCEDURE — 82962 GLUCOSE BLOOD TEST: CPT

## 2022-05-30 PROCEDURE — 84132 ASSAY OF SERUM POTASSIUM: CPT

## 2022-05-30 PROCEDURE — 80048 BASIC METABOLIC PNL TOTAL CA: CPT

## 2022-05-30 PROCEDURE — 94002 VENT MGMT INPAT INIT DAY: CPT

## 2022-05-30 PROCEDURE — C1889: CPT

## 2022-05-30 PROCEDURE — 93880 EXTRACRANIAL BILAT STUDY: CPT

## 2022-05-30 PROCEDURE — 86923 COMPATIBILITY TEST ELECTRIC: CPT

## 2022-05-30 PROCEDURE — 36415 COLL VENOUS BLD VENIPUNCTURE: CPT

## 2022-05-30 PROCEDURE — 82947 ASSAY GLUCOSE BLOOD QUANT: CPT

## 2022-05-30 PROCEDURE — 97165 OT EVAL LOW COMPLEX 30 MIN: CPT

## 2022-06-06 ENCOUNTER — OUTPATIENT (OUTPATIENT)
Dept: OUTPATIENT SERVICES | Facility: HOSPITAL | Age: 47
LOS: 1 days | End: 2022-06-06
Payer: SELF-PAY

## 2022-06-06 ENCOUNTER — APPOINTMENT (OUTPATIENT)
Dept: VASCULAR SURGERY | Facility: HOSPITAL | Age: 47
End: 2022-06-06
Payer: COMMERCIAL

## 2022-06-06 VITALS
SYSTOLIC BLOOD PRESSURE: 157 MMHG | TEMPERATURE: 96.5 F | HEART RATE: 59 BPM | HEIGHT: 67 IN | DIASTOLIC BLOOD PRESSURE: 90 MMHG | BODY MASS INDEX: 24.33 KG/M2 | WEIGHT: 155 LBS

## 2022-06-06 DIAGNOSIS — I74.09 OTHER ARTERIAL EMBOLISM AND THROMBOSIS OF ABDOMINAL AORTA: ICD-10-CM

## 2022-06-06 DIAGNOSIS — I73.9 PERIPHERAL VASCULAR DISEASE, UNSPECIFIED: ICD-10-CM

## 2022-06-06 PROCEDURE — G0463: CPT

## 2022-06-06 PROCEDURE — 99213 OFFICE O/P EST LOW 20 MIN: CPT

## 2022-06-06 RX ORDER — ASPIRIN 325 MG/1
325 TABLET, COATED ORAL
Qty: 30 | Refills: 0 | Status: DISCONTINUED | COMMUNITY
Start: 2022-05-02 | End: 2022-06-06

## 2022-06-06 NOTE — ASSESSMENT
[FreeTextEntry1] : 46 M with bilat LE claudication after a month.  Previously with foot wound and rest pain which have improved after smoking cessation and CABG.  VIN 0.49 R and 0.56 L with suggestion of inflow disease.  \par \par Will obtain CTA to eval inflow\par Exercise program recommended and explained to patient\par Smoking cessation reinforced\par Continue current medical therapy\par Follow up in clinic in 3 months or sooner if rest pain/wounds develop

## 2022-06-06 NOTE — PHYSICAL EXAM
[Normal Breath Sounds] : Normal breath sounds [Normal Rate and Rhythm] : normal rate and rhythm [2+] : left 2+ [0] : left 0 [Alert] : alert [Oriented to Person] : oriented to person [Oriented to Place] : oriented to place [Oriented to Time] : oriented to time [Calm] : calm [JVD] : no jugular venous distention  [Ankle Swelling (On Exam)] : not present [Varicose Veins Of Lower Extremities] : not present [de-identified] : NAD [] : not present [de-identified] : NCAT [de-identified] : left radial and chest incisions healing well.  No feet wounds present

## 2022-06-06 NOTE — REVIEW OF SYSTEMS
[As noted in HPI] : as noted in HPI [Leg Claudication] : intermittent leg claudication [Negative] : Heme/Lymph

## 2022-06-06 NOTE — HISTORY OF PRESENT ILLNESS
[FreeTextEntry1] : 46M presents for evaluation of PAD. He states that he has previously been seen at an OSH with a nonhealing R toe wound in Jan of this year but it has since healed. He complains of claudication in both legs and new R sided rest pain that has been present for a few weeks. It is only relieved when he dangles his R foot off of the bd. He currently takes ASA 81. He was a smoker who packed 1PPD for 20 years but quit one month ago. \par \par He was seen by an outpatient provider who performed VIN/PVRs and told him that he needed revascularization. However, he does not have those records at this time.  [de-identified] : Patient received CABG using L radial artery 3/25/2022 and has also quit smoking.  Since this time, his R toe wound has healed and he is no longer having rest pain.  He jhony still admit to significant claudication- able to walk approximately 1 block.  He is on ASA, Brilinta, and high dose statin.

## 2022-06-07 ENCOUNTER — OUTPATIENT (OUTPATIENT)
Dept: OUTPATIENT SERVICES | Facility: HOSPITAL | Age: 47
LOS: 1 days | End: 2022-06-07
Payer: SELF-PAY

## 2022-06-07 ENCOUNTER — APPOINTMENT (OUTPATIENT)
Dept: CT IMAGING | Facility: HOSPITAL | Age: 47
End: 2022-06-07
Payer: COMMERCIAL

## 2022-06-07 DIAGNOSIS — Z00.8 ENCOUNTER FOR OTHER GENERAL EXAMINATION: ICD-10-CM

## 2022-06-07 DIAGNOSIS — I73.9 PERIPHERAL VASCULAR DISEASE, UNSPECIFIED: ICD-10-CM

## 2022-06-07 PROCEDURE — 75635 CT ANGIO ABDOMINAL ARTERIES: CPT | Mod: 26

## 2022-06-07 PROCEDURE — 75635 CT ANGIO ABDOMINAL ARTERIES: CPT

## 2022-06-20 ENCOUNTER — APPOINTMENT (OUTPATIENT)
Dept: VASCULAR SURGERY | Facility: HOSPITAL | Age: 47
End: 2022-06-20

## 2022-07-20 ENCOUNTER — APPOINTMENT (OUTPATIENT)
Dept: CARDIOLOGY | Facility: HOSPITAL | Age: 47
End: 2022-07-20

## 2022-07-20 ENCOUNTER — OUTPATIENT (OUTPATIENT)
Dept: OUTPATIENT SERVICES | Facility: HOSPITAL | Age: 47
LOS: 1 days | End: 2022-07-20
Payer: SELF-PAY

## 2022-07-20 VITALS
WEIGHT: 155 LBS | OXYGEN SATURATION: 98 % | DIASTOLIC BLOOD PRESSURE: 91 MMHG | BODY MASS INDEX: 24.33 KG/M2 | HEIGHT: 67 IN | HEART RATE: 66 BPM | SYSTOLIC BLOOD PRESSURE: 151 MMHG

## 2022-07-20 DIAGNOSIS — I25.10 ATHEROSCLEROTIC HEART DISEASE OF NATIVE CORONARY ARTERY WITHOUT ANGINA PECTORIS: ICD-10-CM

## 2022-07-20 PROCEDURE — G0463: CPT

## 2022-07-20 PROCEDURE — 93005 ELECTROCARDIOGRAM TRACING: CPT

## 2022-07-21 NOTE — REASON FOR VISIT
[FreeTextEntry1] : 47 M with CAD s/p CABG x 2 (LIMA to LAD, L Rad to OM), preserved EF, PAD, HTN, presents for follow up. He saw vascular surgery for his PAD and was given an exercise program. He has not seen improvement in his clauditation. He was started on brillinta and ASA. He denies chest pain, SOB, PND or orthopnea.

## 2022-07-21 NOTE — ASSESSMENT
[FreeTextEntry1] : 47 M with CAD s/p CABG x 2 (LIMA to LAD, L Rad to OM), preserved EF, PAD, HTN, presents to establish care with a cardiologist. \par \par CAD: s/p CABG:\par -c/w Aspirin 81mg and brillinta 90mg BID (DAPT for at least 6 months), metoprolol succ 50mg daily, atorvastatin 80mg daily\par -last a1c 6 and  in 2/2022, will recheck in two months\par \par PAD: with claudication\par -CT with R SFA and L SFA with collateral circulation \par -follow up with vascular surgery \par \par Ursula Araujo MD

## 2022-07-25 ENCOUNTER — EMERGENCY (EMERGENCY)
Facility: HOSPITAL | Age: 47
LOS: 1 days | Discharge: HOME | End: 2022-07-25
Attending: EMERGENCY MEDICINE | Admitting: EMERGENCY MEDICINE
Payer: MEDICAID

## 2022-07-25 ENCOUNTER — NON-APPOINTMENT (OUTPATIENT)
Age: 47
End: 2022-07-25

## 2022-07-25 VITALS
WEIGHT: 165.35 LBS | DIASTOLIC BLOOD PRESSURE: 85 MMHG | RESPIRATION RATE: 18 BRPM | OXYGEN SATURATION: 100 % | TEMPERATURE: 99 F | HEIGHT: 68 IN | SYSTOLIC BLOOD PRESSURE: 148 MMHG | HEART RATE: 81 BPM

## 2022-07-25 PROCEDURE — 99283 EMERGENCY DEPT VISIT LOW MDM: CPT

## 2022-07-25 RX ORDER — AMOXICILLIN 250 MG/5ML
1 SUSPENSION, RECONSTITUTED, ORAL (ML) ORAL
Qty: 21 | Refills: 0
Start: 2022-07-25 | End: 2022-07-31

## 2022-07-25 RX ORDER — AMOXICILLIN 250 MG/5ML
500 SUSPENSION, RECONSTITUTED, ORAL (ML) ORAL ONCE
Refills: 0 | Status: COMPLETED | OUTPATIENT
Start: 2022-07-25 | End: 2022-07-25

## 2022-07-25 RX ADMIN — Medication 500 MILLIGRAM(S): at 08:55

## 2022-07-25 NOTE — ED ADULT TRIAGE NOTE - CHIEF COMPLAINT QUOTE
Patient presents to ED from home complaining of toothache and facial swelling. Patient took wife's Augmentin last night and this morning.

## 2022-07-25 NOTE — ED PROVIDER NOTE - OBJECTIVE STATEMENT
47M presents to the ED with dental pain and facial swelling. His dentist said that he would see him when the swelling goes down but he should take an antibiotic first. He did not prescribe the antibiotic. He told him to go to the ED.

## 2022-07-25 NOTE — ED PROVIDER NOTE - NSFOLLOWUPINSTRUCTIONS_ED_ALL_ED_FT
Dental Abscess    WHAT YOU NEED TO KNOW:    A dental abscess is a collection of pus in or around a tooth. A dental abscess is caused by bacteria. The bacteria can enter the tooth when the enamel (outer part of the tooth) is damaged by tooth decay. Bacteria can also enter the tooth through a chip in the tooth or a cut in the gum. Food particles that are stuck between the teeth for a long time may also lead to an abscess.   Dental Abscess         DISCHARGE INSTRUCTIONS:    Return to the emergency department if:   •You have severe pain in your tooth or jaw.      •You have trouble breathing because of pain or swelling.      Call your doctor if:   •Your symptoms get worse, even after treatment.      •Your mouth is bleeding.      •You cannot eat or drink because of pain or swelling.      •Your abscess returns.      •You have an injury that causes a crack in your tooth.      •You have questions or concerns about your condition or care.      Medicines: You may need any of the following:   •Antibiotics help treat a bacterial infection.         •Acetaminophen decreases pain and fever. It is available without a doctor's order. Ask how much to take and how often to take it. Follow directions. Read the labels of all other medicines you are using to see if they also contain acetaminophen, or ask your doctor or pharmacist. Acetaminophen can cause liver damage if not taken correctly.      •Take your medicine as directed. Contact your healthcare provider if you think your medicine is not helping or if you have side effects. Tell him or her if you are allergic to any medicine. Keep a list of the medicines, vitamins, and herbs you take. Include the amounts, and when and why you take them. Bring the list or the pill bottles to follow-up visits. Carry your medicine list with you in case of an emergency.      Self-care:   •Rinse your mouth every 2 hours with salt water. This will help keep the area clean.       •Gently brush your teeth twice a day with a soft tooth brush. This will help keep the area clean.       •Eat soft foods as directed. Soft foods may cause less pain. Examples include applesauce, yogurt, and cooked pasta. Ask your healthcare provider how long to follow this instruction.       •Apply a warm compress to your tooth or gum. Use a cotton ball or gauze soaked in warm water. Remove the compress in 10 minutes or when it becomes cool. Repeat 3 times a day.       Prevent another abscess:   •Brush your teeth at least 2 times a day with fluoride toothpaste.      •Use dental floss at least once a day to clean between your teeth.      •Rinse your mouth with water or mouthwash after meals and snacks. Chew sugarless gum.      •Avoid sugary and starchy food that can stick between your teeth. Limit drinks high in sugar, such as soda or fruit juice.      •See your dentist every 6 months for dental cleanings and oral exams.      Follow up with your doctor or dentist in 24 hours, or as directed: Your healthcare provider will need to check your teeth and gums. Write down your questions so you remember to ask them during your visits.

## 2022-07-25 NOTE — ED PROVIDER NOTE - PATIENT PORTAL LINK FT
You can access the FollowMyHealth Patient Portal offered by [facility] by registering at the following website: [email or web address]. By joining [employer]’s FollowMyHealth portal, you will also be able to view your health information using other applications (apps) compatible with our system.

## 2022-07-25 NOTE — ED ADULT NURSE NOTE - OBJECTIVE STATEMENT
Assumed pt care for a 47 yr old male complaining of pain to this upper tooth. Pt reports for the last two days has had pain to this tooth with some swelling. Pt denies any discharge bleeding or trauma. Denies any fever chills or ear pain. Awaiting further disposition.

## 2022-08-30 NOTE — ED ADULT TRIAGE NOTE - ESI TRIAGE ACUITY LEVEL, MLM
[IUD] : has an intrauterine device [Y] : Patient is sexually active [N] : Patient denies prior pregnancies [Menarche Age: ____] : age at menarche was [unfilled] [No] : Patient does not have concerns regarding sex [GonorrheaDate] : 06/30/22 [TextBox_63] : NEG [ChlamydiaDate] : 06/30/22 3 [TextBox_68] : NEG [TrichomonasDate] : 06/30/22 [TextBox_73] : NEG [LMPDate] : 08/03/22 [de-identified] : KYLEENA [PGHxTotal] : 0 [FreeTextEntry1] : 08/03/22

## 2022-09-19 ENCOUNTER — APPOINTMENT (OUTPATIENT)
Dept: VASCULAR SURGERY | Facility: HOSPITAL | Age: 47
End: 2022-09-19

## 2022-09-28 ENCOUNTER — OUTPATIENT (OUTPATIENT)
Dept: OUTPATIENT SERVICES | Facility: HOSPITAL | Age: 47
LOS: 1 days | End: 2022-09-28
Payer: SELF-PAY

## 2022-09-28 ENCOUNTER — APPOINTMENT (OUTPATIENT)
Dept: FAMILY MEDICINE | Facility: HOSPITAL | Age: 47
End: 2022-09-28

## 2022-09-28 VITALS
TEMPERATURE: 97.4 F | WEIGHT: 168 LBS | DIASTOLIC BLOOD PRESSURE: 84 MMHG | BODY MASS INDEX: 26.31 KG/M2 | SYSTOLIC BLOOD PRESSURE: 132 MMHG | RESPIRATION RATE: 16 BRPM | HEART RATE: 74 BPM | OXYGEN SATURATION: 99 %

## 2022-09-28 DIAGNOSIS — Z00.00 ENCOUNTER FOR GENERAL ADULT MEDICAL EXAMINATION WITHOUT ABNORMAL FINDINGS: ICD-10-CM

## 2022-09-28 PROCEDURE — 83036 HEMOGLOBIN GLYCOSYLATED A1C: CPT

## 2022-09-28 PROCEDURE — G0463: CPT

## 2022-09-28 PROCEDURE — 80061 LIPID PANEL: CPT

## 2022-09-29 DIAGNOSIS — I10 ESSENTIAL (PRIMARY) HYPERTENSION: ICD-10-CM

## 2022-09-29 DIAGNOSIS — I25.10 ATHEROSCLEROTIC HEART DISEASE OF NATIVE CORONARY ARTERY WITHOUT ANGINA PECTORIS: ICD-10-CM

## 2022-09-29 DIAGNOSIS — I73.9 PERIPHERAL VASCULAR DISEASE, UNSPECIFIED: ICD-10-CM

## 2022-09-29 DIAGNOSIS — Z95.1 PRESENCE OF AORTOCORONARY BYPASS GRAFT: ICD-10-CM

## 2022-10-03 NOTE — PLAN
[FreeTextEntry1] : 47 M with CAD s/p CABG x 2 (LIMA to LAD, L Rad to OM), preserved EF, PAD, HTN, presents for follow up\par \par # CAD: s/p CABG:\par -c/w Aspirin 81mg and brillinta 90mg BID (DAPT for at least 6 months), metoprolol succ 50mg daily, atorvastatin 80mg daily\par -last a1c 6 and  in 2/2022. Repeat of A1C and Lipid profile done.\par \par # PAD: with claudication\par -follow up with vascular surgery \par - Continue ASA and Brillanta\par \par #HTN\par - BP today 132/84\par - continue HCTZ and amlodipine\par \par rtc in 3 months for HTN follow up\par \par d/w Dr. Osullivan\par \par

## 2022-10-03 NOTE — HISTORY OF PRESENT ILLNESS
[FreeTextEntry1] : CAD/ PAD follow up [de-identified] : 47 M with CAD s/p CABG x 2 (LIMA to LAD, L Rad to OM), preserved EF, PAD, HTN, presents for follow up. He saw vascular surgery for his PAD and was given an exercise program, no sugical intervention at this time. He has not seen improvement in his clauditation. He was started on Brilinta and ASA. He denies chest pain, SOB, PND or orthopnea. He also followed with cardiology who recommended checking repeat  A1C and and Lipid profile. Denies fevers, chills, n/v, cough, sick contacts, SOB, CP, urinary /bowel changes.\par \par

## 2022-10-25 ENCOUNTER — OUTPATIENT (OUTPATIENT)
Dept: OUTPATIENT SERVICES | Facility: HOSPITAL | Age: 47
LOS: 1 days | End: 2022-10-25

## 2022-10-25 DIAGNOSIS — I25.10 ATHEROSCLEROTIC HEART DISEASE OF NATIVE CORONARY ARTERY WITHOUT ANGINA PECTORIS: ICD-10-CM

## 2022-10-26 ENCOUNTER — APPOINTMENT (OUTPATIENT)
Dept: CARDIOLOGY | Facility: HOSPITAL | Age: 47
End: 2022-10-26

## 2022-10-26 ENCOUNTER — NON-APPOINTMENT (OUTPATIENT)
Age: 47
End: 2022-10-26

## 2022-10-26 ENCOUNTER — OUTPATIENT (OUTPATIENT)
Dept: OUTPATIENT SERVICES | Facility: HOSPITAL | Age: 47
LOS: 1 days | End: 2022-10-26
Payer: SELF-PAY

## 2022-10-26 VITALS
HEIGHT: 67 IN | OXYGEN SATURATION: 99 % | DIASTOLIC BLOOD PRESSURE: 88 MMHG | BODY MASS INDEX: 26.37 KG/M2 | HEART RATE: 77 BPM | WEIGHT: 168 LBS | SYSTOLIC BLOOD PRESSURE: 146 MMHG

## 2022-10-26 DIAGNOSIS — I25.10 ATHEROSCLEROTIC HEART DISEASE OF NATIVE CORONARY ARTERY WITHOUT ANGINA PECTORIS: ICD-10-CM

## 2022-10-26 PROCEDURE — 93005 ELECTROCARDIOGRAM TRACING: CPT

## 2022-10-26 PROCEDURE — G0463: CPT

## 2022-10-27 NOTE — PHYSICAL EXAM
[Well Developed] : well developed [Well Nourished] : well nourished [No Acute Distress] : no acute distress [Normal Conjunctiva] : normal conjunctiva [Normal Venous Pressure] : normal venous pressure [Normal S1, S2] : normal S1, S2 [No Murmur] : no murmur [No Rub] : no rub [No Gallop] : no gallop [Clear Lung Fields] : clear lung fields [Good Air Entry] : good air entry [No Respiratory Distress] : no respiratory distress  [Soft] : abdomen soft [Non Tender] : non-tender [No Masses/organomegaly] : no masses/organomegaly [Normal Gait] : normal gait [No Edema] : no edema [No Cyanosis] : no cyanosis [No Clubbing] : no clubbing [No Rash] : no rash [Moves all extremities] : moves all extremities [No Focal Deficits] : no focal deficits [Alert and Oriented] : alert and oriented [de-identified] : DPP weakly palpable b/l

## 2022-10-27 NOTE — REASON FOR VISIT
[FreeTextEntry1] : 47 M with CAD s/p CABG x 2 (LIMA to LAD, L Rad to OM), preserved EF, PAD, HTN, presents for follow up. He reports continued claudication despite an exercise program. He is compliant with DAPT, statin. He denies chest pain, SOB, PND or orthopnea.

## 2022-10-27 NOTE — REVIEW OF SYSTEMS
[Leg Claudication] : intermittent leg claudication [Negative] : Heme/Lymph [SOB] : no shortness of breath [Dyspnea on exertion] : not dyspnea during exertion [Chest Discomfort] : no chest discomfort [Lower Ext Edema] : no extremity edema [Palpitations] : no palpitations [Orthopnea] : no orthopnea [PND] : no PND [Syncope] : no syncope [Cough] : no cough [Wheezing] : no wheezing [Coughing Up Blood] : no hemoptysis [Snoring] : no snoring

## 2022-10-28 DIAGNOSIS — I73.9 PERIPHERAL VASCULAR DISEASE, UNSPECIFIED: ICD-10-CM

## 2022-11-03 LAB
CHOLEST SERPL-MCNC: 186 MG/DL
ESTIMATED AVERAGE GLUCOSE: 117 MG/DL
HBA1C MFR BLD HPLC: 5.7 %
HDLC SERPL-MCNC: 53 MG/DL
LDLC SERPL CALC-MCNC: 99 MG/DL
NONHDLC SERPL-MCNC: 133 MG/DL
TRIGL SERPL-MCNC: 169 MG/DL

## 2022-11-14 NOTE — PRE-ANESTHESIA EVALUATION ADULT - NSANTHINDVINFOSD_GEN_ALL_CORE
Subjective:   Rik Lyman is a 59 y.o. male who was seen for Follow-up (New to provider/Routine 6 month follow up )    Patient with a history of high blood pressure. Compliant with losartan and hydrochlorothiazide. No headaches, vision changes, dizziness, lightheadedness, edema. Patient also with history of diabetes. On metformin daily. Reports he checks his sugars at home and notes well controlled in the 150s. Patient on Trulicity as well once a week. He also has high cholesterol and takes cholesterol medicine daily    Home Medications    Medication Sig Start Date End Date Taking? Authorizing Provider   Trulicity 1.5 CI/4.0 mL sub-q pen INJECT 0.5M ML SUBCUTANEOUS ROUTE EVERY 7 DAYS 11/6/22  Yes Raymond Ivory MD   hydroCHLOROthiazide (HYDRODIURIL) 12.5 mg tablet Take 1 Tablet by mouth daily. 10/13/22  Yes Raymond Ivory MD   atorvastatin (LIPITOR) 40 mg tablet Take 1 Tablet by mouth daily. 10/13/22  Yes Raymond Ivory MD   metFORMIN (GLUCOPHAGE) 500 mg tablet Take 1 Tablet by mouth two (2) times daily (with meals). 10/7/22  Yes Queenie Avalos MD   losartan (COZAAR) 100 mg tablet TAKE 1 TABLET DAILY 11/29/21  Yes Queenie Avalos MD      No Known Allergies  Social History     Tobacco Use    Smoking status: Former     Years: 10.00     Types: Cigarettes    Smokeless tobacco: Never   Vaping Use    Vaping Use: Never used   Substance Use Topics    Alcohol use: Never    Drug use: Never        Review of Systems   As noted above in HPI. Objective:   Visit Vitals  /87   Pulse 82   Temp 98.7 °F (37.1 °C) (Temporal)   Ht 6' (1.829 m)   Wt 242 lb 6.4 oz (110 kg)   SpO2 96%   BMI 32.88 kg/m²        General: alert, oriented, not in distress  Chest/Lungs: clear breath sounds, no wheezing or crackles  Heart: normal rate, regular rhythm, no murmur  Extremities: no focal deformities, no edema  Skin: no active skin lesions      Assessment & Plan:     1. Essential hypertension  Controlled. Goal less than 140/90 per JNC 8 guidelines. Continue to recommend DASH diet and getting at least 150 minutes of moderate intensity activity a week  - CBC WITH AUTOMATED DIFF; Future  - METABOLIC PANEL, BASIC; Future  - hydroCHLOROthiazide (HYDRODIURIL) 12.5 mg tablet; Take 1 Tablet by mouth daily. Dispense: 90 Tablet; Refill: 1  - losartan (COZAAR) 100 mg tablet; TAKE 1 TABLET DAILY  Dispense: 90 Tablet; Refill: 3    2. Controlled type 2 diabetes mellitus without complication, without long-term current use of insulin (HCC)  Controlled. Continue Trulicity and metformin  - MICROALBUMIN, UR, RAND W/ MICROALB/CREAT RATIO; Future  - HEMOGLOBIN A1C WITH EAG; Future  - dulaglutide (Trulicity) 1.5 TJ/2.4 mL sub-q pen; 0.5 mL by SubCUTAneous route every seven (7) days. Dispense: 4 Each; Refill: 3  - metFORMIN (GLUCOPHAGE) 500 mg tablet; Take 1 Tablet by mouth two (2) times daily (with meals). Dispense: 180 Tablet; Refill: 3    3. Moderate mixed hyperlipidemia not requiring statin therapy  Stable  - LIPID PANEL; Future  - atorvastatin (LIPITOR) 40 mg tablet; Take 1 Tablet by mouth daily. Dispense: 90 Tablet; Refill: 1    4. Need for hepatitis C screening test  - HEPATITIS C AB; Future            I have discussed the diagnosis with the patient and the intended plan as seen in the above orders. The patient has received an after-visit summary and questions were answered concerning future plans. I have discussed medication side effects and warnings with the patient as well. I have reviewed the plan of care with the patient, accepted their input and they are in agreement with the treatment goals. Previous lab and imaging results were reviewed by me.        Geno Mejia MD  November 14, 2022 patient

## 2023-02-01 ENCOUNTER — NON-APPOINTMENT (OUTPATIENT)
Age: 48
End: 2023-02-01

## 2023-02-01 ENCOUNTER — OUTPATIENT (OUTPATIENT)
Dept: OUTPATIENT SERVICES | Facility: HOSPITAL | Age: 48
LOS: 1 days | End: 2023-02-01
Payer: SELF-PAY

## 2023-02-01 ENCOUNTER — APPOINTMENT (OUTPATIENT)
Dept: CARDIOLOGY | Facility: HOSPITAL | Age: 48
End: 2023-02-01

## 2023-02-01 VITALS
OXYGEN SATURATION: 99 % | WEIGHT: 183 LBS | SYSTOLIC BLOOD PRESSURE: 164 MMHG | HEART RATE: 82 BPM | DIASTOLIC BLOOD PRESSURE: 89 MMHG | BODY MASS INDEX: 28.72 KG/M2 | HEIGHT: 67 IN

## 2023-02-01 DIAGNOSIS — I25.10 ATHEROSCLEROTIC HEART DISEASE OF NATIVE CORONARY ARTERY WITHOUT ANGINA PECTORIS: ICD-10-CM

## 2023-02-01 PROCEDURE — G0463: CPT

## 2023-02-01 PROCEDURE — 93005 ELECTROCARDIOGRAM TRACING: CPT

## 2023-02-16 NOTE — ASSESSMENT
[FreeTextEntry1] : CAD (coronary artery disease) (414.00) (I25.10)\par  · - c/w Aspirin 81mg and brillinta 90mg BID, metoprolol\par     succ 50mg daily, atorvastatin 80mg daily. WIll keep brillinta for at least one more month to complete one year post CABG.\par     -improved LDL <100 on most recent lipid profile, continue ezetimibe for goal LDL <70;\par     f/u lipid profile now\par PAD (peripheral artery disease) (443.9) (I73.9)\par  · -CT with R SFA and L SFA with collateral circulation; currently undergoing exercise\par     program\par     -pt to follow up with vascular surgery now given no improvement with exercise program and medications. He may need bypass. \par

## 2023-02-16 NOTE — REASON FOR VISIT
[FreeTextEntry1] : 47 M with CAD s/p CABG x 2 (LIMA to LAD, L Rad to OM), preserved EF, PAD, HTN, presents for follow up. He is compliant with DAPT, statin. He denies chest pain, SOB, PND or orthopnea. SInce his last visit, he started ezetemibe. He still reports claudication with walking up a hill near his home. \par  \par

## 2023-03-29 ENCOUNTER — APPOINTMENT (OUTPATIENT)
Dept: CARDIOLOGY | Facility: CLINIC | Age: 48
End: 2023-03-29
Payer: MEDICAID

## 2023-03-29 VITALS
OXYGEN SATURATION: 98 % | DIASTOLIC BLOOD PRESSURE: 96 MMHG | SYSTOLIC BLOOD PRESSURE: 165 MMHG | HEIGHT: 67 IN | HEART RATE: 83 BPM

## 2023-03-29 PROCEDURE — 93000 ELECTROCARDIOGRAM COMPLETE: CPT

## 2023-03-29 PROCEDURE — 99244 OFF/OP CNSLTJ NEW/EST MOD 40: CPT

## 2023-03-29 NOTE — PHYSICAL EXAM
[General Appearance - In No Acute Distress] : no acute distress [Normal Conjunctiva] : the conjunctiva exhibited no abnormalities [Normal Oral Mucosa] : normal oral mucosa [Heart Sounds] : normal S1 and S2 [Murmurs] : no murmurs present [Edema] : no peripheral edema present [FreeTextEntry1] : Dopplerable PT/DP bilateral  [Auscultation Breath Sounds / Voice Sounds] : lungs were clear to auscultation bilaterally [Abdomen Soft] : soft [Abdomen Tenderness] : non-tender [Abnormal Walk] : normal gait [Cyanosis, Localized] : no localized cyanosis [] : no rash [Skin Lesions] : no skin lesions [No Skin Ulcers] : no skin ulcer [Oriented To Time, Place, And Person] : oriented to person, place, and time [Impaired Insight] : insight and judgment were intact [Affect] : the affect was normal

## 2023-03-29 NOTE — ASSESSMENT
[FreeTextEntry1] : PAD with lifestyle limiting claudication, prior R toe wound with delayed healing. Known CTOs bilateral SFAs. No BTK disease. \par \par -exercise therapy; GDMT; RF modification\par -given persistent claudication despite walking and intensification of GDMT will set-up for diagnostic peripheral angiography\par -based on anatomy will discuss surgical vs endovascular approach to re-establishing flow BTK

## 2023-03-29 NOTE — REASON FOR VISIT
[Consultation] : a consultation regarding [Peripheral Vascular Disease] : peripheral vascular disease [FreeTextEntry1] : 47 M with CAD s/p CABG x 2 (LIMA to LAD, L Rad to OM), preserved EF, PAD with known R and L SFA  with claudication, HTN who is referred for vascular medicine consultation. \par \par He has claudication after walking a little over a block. He has had a toe ulcer in the past that took many months to heal. He had a CTA and arterial duplex done previously. He has known  of the bilateral SFAs. He denies any CP, SOB or palpitations. He walks every day; has to stop for relief from pain but then keeps walking. No active wounds now.  He is on DAPT, high intensity statin and zetia. \par \par Non-invasive testing:\par 5/24/2022: RABI 0.49, LABI 0.56\par CTA: RSFA ostial moderate disease, distal RSFA  with reconstitution\par LSFA with ostial occlusion and reconstitution at the L Popliteal \par No BTK disease. \par

## 2023-04-13 ENCOUNTER — TRANSCRIPTION ENCOUNTER (OUTPATIENT)
Age: 48
End: 2023-04-13

## 2023-04-13 ENCOUNTER — OUTPATIENT (OUTPATIENT)
Dept: OUTPATIENT SERVICES | Facility: HOSPITAL | Age: 48
LOS: 1 days | End: 2023-04-13
Payer: MEDICAID

## 2023-04-13 VITALS
RESPIRATION RATE: 16 BRPM | HEART RATE: 93 BPM | TEMPERATURE: 98 F | OXYGEN SATURATION: 98 % | SYSTOLIC BLOOD PRESSURE: 161 MMHG | DIASTOLIC BLOOD PRESSURE: 97 MMHG

## 2023-04-13 VITALS — HEIGHT: 66 IN | WEIGHT: 175.05 LBS

## 2023-04-13 DIAGNOSIS — I73.9 PERIPHERAL VASCULAR DISEASE, UNSPECIFIED: ICD-10-CM

## 2023-04-13 DIAGNOSIS — Z95.1 PRESENCE OF AORTOCORONARY BYPASS GRAFT: Chronic | ICD-10-CM

## 2023-04-13 LAB
ANION GAP SERPL CALC-SCNC: 12 MMOL/L — SIGNIFICANT CHANGE UP (ref 5–17)
BLD GP AB SCN SERPL QL: NEGATIVE — SIGNIFICANT CHANGE UP
BUN SERPL-MCNC: 23 MG/DL — SIGNIFICANT CHANGE UP (ref 7–23)
CALCIUM SERPL-MCNC: 9.6 MG/DL — SIGNIFICANT CHANGE UP (ref 8.4–10.5)
CHLORIDE SERPL-SCNC: 100 MMOL/L — SIGNIFICANT CHANGE UP (ref 96–108)
CO2 SERPL-SCNC: 27 MMOL/L — SIGNIFICANT CHANGE UP (ref 22–31)
CREAT SERPL-MCNC: 0.87 MG/DL — SIGNIFICANT CHANGE UP (ref 0.5–1.3)
EGFR: 106 ML/MIN/1.73M2 — SIGNIFICANT CHANGE UP
GLUCOSE SERPL-MCNC: 132 MG/DL — HIGH (ref 70–99)
HCT VFR BLD CALC: 45.2 % — SIGNIFICANT CHANGE UP (ref 39–50)
HGB BLD-MCNC: 15.3 G/DL — SIGNIFICANT CHANGE UP (ref 13–17)
MCHC RBC-ENTMCNC: 31.2 PG — SIGNIFICANT CHANGE UP (ref 27–34)
MCHC RBC-ENTMCNC: 33.8 GM/DL — SIGNIFICANT CHANGE UP (ref 32–36)
MCV RBC AUTO: 92.1 FL — SIGNIFICANT CHANGE UP (ref 80–100)
NRBC # BLD: 0 /100 WBCS — SIGNIFICANT CHANGE UP (ref 0–0)
PLATELET # BLD AUTO: 253 K/UL — SIGNIFICANT CHANGE UP (ref 150–400)
POTASSIUM SERPL-MCNC: 3.3 MMOL/L — LOW (ref 3.5–5.3)
POTASSIUM SERPL-SCNC: 3.3 MMOL/L — LOW (ref 3.5–5.3)
RBC # BLD: 4.91 M/UL — SIGNIFICANT CHANGE UP (ref 4.2–5.8)
RBC # FLD: 12.7 % — SIGNIFICANT CHANGE UP (ref 10.3–14.5)
RH IG SCN BLD-IMP: POSITIVE — SIGNIFICANT CHANGE UP
SODIUM SERPL-SCNC: 139 MMOL/L — SIGNIFICANT CHANGE UP (ref 135–145)
WBC # BLD: 8.07 K/UL — SIGNIFICANT CHANGE UP (ref 3.8–10.5)
WBC # FLD AUTO: 8.07 K/UL — SIGNIFICANT CHANGE UP (ref 3.8–10.5)

## 2023-04-13 PROCEDURE — 80048 BASIC METABOLIC PNL TOTAL CA: CPT

## 2023-04-13 PROCEDURE — C1725: CPT

## 2023-04-13 PROCEDURE — 93005 ELECTROCARDIOGRAM TRACING: CPT

## 2023-04-13 PROCEDURE — C1894: CPT

## 2023-04-13 PROCEDURE — 37223: CPT

## 2023-04-13 PROCEDURE — 37221: CPT

## 2023-04-13 PROCEDURE — C1769: CPT

## 2023-04-13 PROCEDURE — C1876: CPT

## 2023-04-13 PROCEDURE — 86900 BLOOD TYPING SEROLOGIC ABO: CPT

## 2023-04-13 PROCEDURE — 85027 COMPLETE CBC AUTOMATED: CPT

## 2023-04-13 PROCEDURE — 86901 BLOOD TYPING SEROLOGIC RH(D): CPT

## 2023-04-13 PROCEDURE — 75716 ARTERY X-RAYS ARMS/LEGS: CPT | Mod: 26,59

## 2023-04-13 PROCEDURE — 36415 COLL VENOUS BLD VENIPUNCTURE: CPT

## 2023-04-13 PROCEDURE — C1887: CPT

## 2023-04-13 PROCEDURE — 75716 ARTERY X-RAYS ARMS/LEGS: CPT | Mod: 59

## 2023-04-13 PROCEDURE — 93010 ELECTROCARDIOGRAM REPORT: CPT

## 2023-04-13 PROCEDURE — 99152 MOD SED SAME PHYS/QHP 5/>YRS: CPT

## 2023-04-13 PROCEDURE — 86850 RBC ANTIBODY SCREEN: CPT

## 2023-04-13 RX ORDER — EZETIMIBE 10 MG/1
1 TABLET ORAL
Refills: 0 | DISCHARGE

## 2023-04-13 RX ORDER — AMLODIPINE BESYLATE 2.5 MG/1
1 TABLET ORAL
Refills: 0 | DISCHARGE

## 2023-04-13 RX ORDER — METOPROLOL TARTRATE 50 MG
1 TABLET ORAL
Qty: 30 | Refills: 2
Start: 2023-04-13 | End: 2023-07-11

## 2023-04-13 RX ORDER — FENTANYL CITRATE 50 UG/ML
25 INJECTION INTRAVENOUS ONCE
Refills: 0 | Status: DISCONTINUED | OUTPATIENT
Start: 2023-04-13 | End: 2023-04-13

## 2023-04-13 RX ORDER — HYDROCHLOROTHIAZIDE 25 MG
1 TABLET ORAL
Qty: 30 | Refills: 2
Start: 2023-04-13 | End: 2023-07-11

## 2023-04-13 RX ORDER — SODIUM CHLORIDE 9 MG/ML
1000 INJECTION INTRAMUSCULAR; INTRAVENOUS; SUBCUTANEOUS
Refills: 0 | Status: DISCONTINUED | OUTPATIENT
Start: 2023-04-13 | End: 2023-04-27

## 2023-04-13 RX ORDER — HYDROCHLOROTHIAZIDE 25 MG
1 TABLET ORAL
Refills: 0 | DISCHARGE

## 2023-04-13 RX ORDER — EZETIMIBE 10 MG/1
1 TABLET ORAL
Qty: 30 | Refills: 2
Start: 2023-04-13 | End: 2023-07-11

## 2023-04-13 RX ORDER — TICAGRELOR 90 MG/1
1 TABLET ORAL
Qty: 60 | Refills: 1
Start: 2023-04-13 | End: 2023-06-11

## 2023-04-13 RX ORDER — SODIUM CHLORIDE 9 MG/ML
3 INJECTION INTRAMUSCULAR; INTRAVENOUS; SUBCUTANEOUS EVERY 8 HOURS
Refills: 0 | Status: DISCONTINUED | OUTPATIENT
Start: 2023-04-13 | End: 2023-04-27

## 2023-04-13 RX ORDER — AMLODIPINE BESYLATE 2.5 MG/1
1 TABLET ORAL
Qty: 30 | Refills: 2
Start: 2023-04-13 | End: 2023-07-11

## 2023-04-13 RX ORDER — POTASSIUM BICARBONATE 978 MG/1
25 TABLET, EFFERVESCENT ORAL ONCE
Refills: 0 | Status: COMPLETED | OUTPATIENT
Start: 2023-04-13 | End: 2023-04-13

## 2023-04-13 RX ORDER — SODIUM CHLORIDE 9 MG/ML
250 INJECTION INTRAMUSCULAR; INTRAVENOUS; SUBCUTANEOUS ONCE
Refills: 0 | Status: COMPLETED | OUTPATIENT
Start: 2023-04-13 | End: 2023-04-13

## 2023-04-13 RX ORDER — ASPIRIN/CALCIUM CARB/MAGNESIUM 324 MG
1 TABLET ORAL
Qty: 0 | Refills: 0 | DISCHARGE

## 2023-04-13 RX ORDER — ATORVASTATIN CALCIUM 80 MG/1
1 TABLET, FILM COATED ORAL
Qty: 30 | Refills: 11
Start: 2023-04-13 | End: 2024-04-06

## 2023-04-13 RX ORDER — ASPIRIN/CALCIUM CARB/MAGNESIUM 324 MG
1 TABLET ORAL
Qty: 30 | Refills: 11
Start: 2023-04-13 | End: 2024-04-06

## 2023-04-13 RX ORDER — ACETAMINOPHEN 500 MG
1000 TABLET ORAL ONCE
Refills: 0 | Status: COMPLETED | OUTPATIENT
Start: 2023-04-13 | End: 2023-04-13

## 2023-04-13 RX ADMIN — SODIUM CHLORIDE 75 MILLILITER(S): 9 INJECTION INTRAMUSCULAR; INTRAVENOUS; SUBCUTANEOUS at 08:10

## 2023-04-13 RX ADMIN — POTASSIUM BICARBONATE 25 MILLIEQUIVALENT(S): 978 TABLET, EFFERVESCENT ORAL at 08:10

## 2023-04-13 RX ADMIN — SODIUM CHLORIDE 3 MILLILITER(S): 9 INJECTION INTRAMUSCULAR; INTRAVENOUS; SUBCUTANEOUS at 13:31

## 2023-04-13 RX ADMIN — SODIUM CHLORIDE 250 MILLILITER(S): 9 INJECTION INTRAMUSCULAR; INTRAVENOUS; SUBCUTANEOUS at 07:30

## 2023-04-13 RX ADMIN — FENTANYL CITRATE 25 MICROGRAM(S): 50 INJECTION INTRAVENOUS at 13:23

## 2023-04-13 RX ADMIN — FENTANYL CITRATE 25 MICROGRAM(S): 50 INJECTION INTRAVENOUS at 12:33

## 2023-04-13 RX ADMIN — Medication 1000 MILLIGRAM(S): at 11:36

## 2023-04-13 RX ADMIN — Medication 400 MILLIGRAM(S): at 11:06

## 2023-04-13 NOTE — ASU DISCHARGE PLAN (ADULT/PEDIATRIC) - NS MD DC FALL RISK RISK
For information on Fall & Injury Prevention, visit: [email or web address] OR  [email or web address] OR  https://www.cdc.gov/steadi/patient.html

## 2023-04-13 NOTE — ASU PREOP CHECKLIST - VIA
Face to face report given with opportunity to observe patient.  Report given to ALANIS Rodas.    Kendal Jackman  3/23/2017, 7:45 AM       stretcher

## 2023-04-13 NOTE — CHART NOTE - NSCHARTNOTEFT_GEN_A_CORE
Removal of Femoral Sheath    Pulses in the left lower extremity are audible by doppler. The patient was placed in the supine position. The insertion site was identified and the sutures were removed per protocol.  The 7 Vietnamese femoral sheath was then removed. Direct pressure was applied for  25 minutes.     Monitoring of the left groin and both lower extremities including neuro-vascular checks and vital signs every 15 minutes x 4, then every 30 minutes x 2, then every 1 hour was ordered.    Complications: None/Other    Comments:  Patient tolerated procedure well. Good hemostasis achieved post sheath pull. No acute distress noted. Importance of medication adherence with DAPT stressed to patient, as well as groin care. Patient verbalized understanding. Will continue to monitor closely. Removal of Femoral Sheath    Pulses in the left lower extremity are audible by doppler. Fentanyl 25mcg IVP x1 administered pre-procedure for pain. The patient was placed in the supine position. The insertion site was identified and the sutures were removed per protocol. The 7 Ghanaian femoral sheath was then removed. Direct pressure was applied for 25 minutes.     Monitoring of the left groin and both lower extremities including neuro-vascular checks and vital signs every 15 minutes x 4, then every 30 minutes x 2, then every 1 hour was ordered.    Complications: None/Other    Comments:  Patient tolerated procedure well. Good hemostasis achieved post sheath pull. No acute distress noted. Importance of medication adherence with DAPT stressed to patient, as well as groin care. Patient verbalized understanding. Will continue to monitor closely.

## 2023-04-13 NOTE — ASU DISCHARGE PLAN (ADULT/PEDIATRIC) - ASU DC SPECIAL INSTRUCTIONSFT
Wound Care:   the day AFTER your procedure remove bandage GENTLY, and clean using  mild soap and gentle warm, water stream, pat dry. leave OPEN to air. YOU MAY SHOWER   DO NOT apply lotions, creams, ointments, powder, perfumes to your incision site  DO NOT SOAK your site for 1 week ( no baths, no pools, no tubs, etc...)  Check  your groin and/or wrist daily. A small amount of bruising, and soreness are normal    ACTIVITY: for 24 hours   - DO NOT DRIVE  - DO NOT make any important decisions or sign legal documents   - DO NOT operate heavy machineries   - you may resume sexual activity in 48 hours, unless otherwise instructed by your cardiologist     If your procedure was done through the [facility]: for the NEXT 5 DAYS  - Limit climbing stairs, DO NOT soak in bathtub or pool  - no strenuous activities, pushing, pulling, straining  - Do not lift anything 10lbs or heavier     MEDICATION:   take your medications as explained ( see discharge paperwork)   If you received a STENT, you will be taking antiplatelet medications to KEEP YOUR STENT OPEN ( eg: Aspirin, Plavix, Brilinta, Effient, etc).  Take as prescribed DO NOT STOP taking them without consulting with your cardiologist first.     Follow heart healthy diet recommended by your doctor, , if you smoke STOP SMOKING ( may call [phone] for center of tobacco control if you need assistance)     CALL your doctor to make appointment in 2 WEEKS     ***CALL YOUR DOCTOR***  if you experience: fever, chills, body aches, or severe pain, swelling, redness, heat or yellow discharge at incision site  If you experience Bleeding or excruciating pain at the procedural site, swelling (golf ball size) at your procedural site  If you experience CHEST PAIN  If you experience extremity numbness, tingling, temperature change (of your procedural site)   If you are unable to reach your doctor, you may contact:   -Cardiology Office at [facility] at [phone] or   - [facility] [phone]  - [facility] [phone]

## 2023-04-13 NOTE — ASU DISCHARGE PLAN (ADULT/PEDIATRIC) - HISTORY OF COVID-19 VACCINATION
Medication Requested:  methylpheniDATE ER (CONCERTA) 36 MG CR tablet (Discontinued 6-24-22 , not needed)    Per 6-20-22 chart note:  > discontinue Concerta---due to lack of continuing benefit    Pharmacy is requesting RF. Call placed to pharmacy and spoke w/ Asha. Writer  stated Provider DC'd med on 6/20/22 as not needed.   Yes

## 2023-04-13 NOTE — ASU DISCHARGE PLAN (ADULT/PEDIATRIC) - DISCHARGE PLAN IS COMPLETE AND GIVEN TO PATIENT
Ok to give pt Morphine 2mg IVP now for abdominal pain TORB Dr. Hanna with critical care. MD made aware contraindication noted when placing order, okayed to continue to give Morphine.    : Yes

## 2023-04-13 NOTE — H&P CARDIOLOGY - HISTORY OF PRESENT ILLNESS
47 M with CAD s/p CABG x 2 (LIMA to LAD, L Rad to OM), preserved EF, PAD with known R and L SFA  with claudication, HTN who  He has claudication after walking a little over a block. He has had a toe ulcer in the past that took many months to heal. He had a CTA and arterial duplex done previously. He has known  of the bilateral SFAs. He denies any CP, SOB or palpitations. He walks every day; has to stop for relief from pain but then keeps walking. No active wounds now. He is on DAPT, high intensity statin and zetia. Now presents for peripheral angiogram.    Non-invasive testin2022: RABI 0.49, LABI 0.56  CTA: RSFA ostial moderate disease, distal RSFA  with reconstitution  LSFA with ostial occlusion and reconstitution at the L Popliteal   No BTK disease.             Card: Roselia [address]              Summary:   1. Normal global left ventricular systolic function.   2. Left ventricular ejection fraction, by visual estimation, is 65 to   70%.   3. Mildly increased LV wall thickness.   4. Normal left ventricular internal cavity size.   5. Normal right ventricular size and function.   6. The left atrium is normal in size.   7. The right atrium is normal in size.   8. Mild mitral valve regurgitation.   9. Aortic valve leaflet calcification. No aortic valve stenosis.  10. There is no evidence of pericardial effusion.

## 2023-04-13 NOTE — ASU DISCHARGE PLAN (ADULT/PEDIATRIC) - CARE PROVIDER_API CALL
Sofia Chanel; PhD)  Internal Medicine  [address] [place] [address]  Phone: [phone]  Fax: [phone]  Established Patient  Follow Up Time: 2 weeks    Avtar Avalos  CARDIOLOGY  [address] [facility], [address]  [place] [address]  Phone: [phone]  Fax: [phone]  Established Patient  Follow Up Time: 2 weeks

## 2023-04-13 NOTE — ASU DISCHARGE PLAN (ADULT/PEDIATRIC) - PROVIDER TOKENS
PROVIDER:[TOKEN:[[id number]],FOLLOWUP:[2 weeks],ESTABLISHEDPATIENT:[T]],PROVIDER:[TOKEN:[[id number]],FOLLOWUP:[2 weeks],ESTABLISHEDPATIENT:[T]]

## 2023-04-13 NOTE — ASU PATIENT PROFILE, ADULT - INTERNATIONAL TRAVEL
Doing well.  No complaints.  Reports fetal movement.  Denies contractions, leakage of fluid, vaginal bleeding.  All questions answered.  RTC in 2 weeks or sooner if needed.     No

## 2023-04-26 ENCOUNTER — APPOINTMENT (OUTPATIENT)
Dept: CARDIOLOGY | Facility: CLINIC | Age: 48
End: 2023-04-26
Payer: COMMERCIAL

## 2023-04-26 VITALS
HEART RATE: 76 BPM | SYSTOLIC BLOOD PRESSURE: 141 MMHG | WEIGHT: 183 LBS | HEIGHT: 67 IN | BODY MASS INDEX: 28.72 KG/M2 | OXYGEN SATURATION: 98 % | DIASTOLIC BLOOD PRESSURE: 83 MMHG

## 2023-04-26 PROCEDURE — 99215 OFFICE O/P EST HI 40 MIN: CPT

## 2023-04-26 NOTE — ASSESSMENT
[FreeTextEntry1] : PAD with lifestyle limiting claudication, prior R toe wound with delayed healing. Known CTOs bilateral SFAs. No BTK disease. \par \par -exercise therapy; GDMT; RF modification\par -given persistent claudication despite walking and intensification of GDMT underwent diagnostic peripheral angiography, s/p RCIA and REIA stent, unable to cross RSFA \par -plan for pedal approach to open up RSFA  given unable to cross from above\par \par

## 2023-04-26 NOTE — REASON FOR VISIT
[Follow-Up - Clinic] : a clinic follow-up of [Peripheral Vascular Disease] : peripheral vascular disease [FreeTextEntry1] : 47 M with CAD s/p CABG x 2 (LIMA to LAD, L Rad to OM), preserved EF, PAD with known R and L SFA  with claudication, HTN who is referred for vascular medicine follow-up. \par \par s/p RCIA stent. Unable to cross  from above. Planned for pedal approach tomorrow. Groin site healed. Bruising. Palpable pulse. \par \par Relevant PMHx: \par He has claudication after walking a little over a block. He has had a toe ulcer in the past that took many months to heal. He had a CTA and arterial duplex done previously. He has known  of the bilateral SFAs. He denies any CP, SOB or palpitations. He walks every day; has to stop for relief from pain but then keeps walking. No active wounds now.  He is on DAPT, high intensity statin and zetia. \par \par Non-invasive testing:\par 5/24/2022: RABI 0.49, LABI 0.56\par CTA: RSFA ostial moderate disease, distal RSFA  with reconstitution\par LSFA with ostial occlusion and reconstitution at the L Popliteal \par No BTK disease. \par

## 2023-04-26 NOTE — PHYSICAL EXAM
[General Appearance - In No Acute Distress] : no acute distress [Normal Conjunctiva] : the conjunctiva exhibited no abnormalities [Normal Oral Mucosa] : normal oral mucosa [Auscultation Breath Sounds / Voice Sounds] : lungs were clear to auscultation bilaterally [Heart Sounds] : normal S1 and S2 [Murmurs] : no murmurs present [Edema] : no peripheral edema present [FreeTextEntry1] : Dopplerable PT/DP bilateral  [Abdomen Tenderness] : non-tender [Abdomen Soft] : soft [Abnormal Walk] : normal gait [Cyanosis, Localized] : no localized cyanosis [] : no ischemic changes [Skin Lesions] : no skin lesions [No Skin Ulcers] : no skin ulcer [Oriented To Time, Place, And Person] : oriented to person, place, and time [Impaired Insight] : insight and judgment were intact [Affect] : the affect was normal

## 2023-04-27 ENCOUNTER — OUTPATIENT (OUTPATIENT)
Dept: OUTPATIENT SERVICES | Facility: HOSPITAL | Age: 48
LOS: 1 days | End: 2023-04-27
Payer: SELF-PAY

## 2023-04-27 ENCOUNTER — TRANSCRIPTION ENCOUNTER (OUTPATIENT)
Age: 48
End: 2023-04-27

## 2023-04-27 VITALS
TEMPERATURE: 99 F | OXYGEN SATURATION: 99 % | SYSTOLIC BLOOD PRESSURE: 131 MMHG | DIASTOLIC BLOOD PRESSURE: 85 MMHG | HEART RATE: 76 BPM | RESPIRATION RATE: 15 BRPM

## 2023-04-27 VITALS
RESPIRATION RATE: 15 BRPM | DIASTOLIC BLOOD PRESSURE: 85 MMHG | SYSTOLIC BLOOD PRESSURE: 165 MMHG | TEMPERATURE: 98 F | WEIGHT: 165.35 LBS | HEART RATE: 80 BPM | OXYGEN SATURATION: 100 % | HEIGHT: 60 IN

## 2023-04-27 DIAGNOSIS — I73.9 PERIPHERAL VASCULAR DISEASE, UNSPECIFIED: ICD-10-CM

## 2023-04-27 DIAGNOSIS — Z95.1 PRESENCE OF AORTOCORONARY BYPASS GRAFT: Chronic | ICD-10-CM

## 2023-04-27 LAB
ANION GAP SERPL CALC-SCNC: 13 MMOL/L — SIGNIFICANT CHANGE UP (ref 5–17)
BUN SERPL-MCNC: 17 MG/DL — SIGNIFICANT CHANGE UP (ref 7–23)
CALCIUM SERPL-MCNC: 10 MG/DL — SIGNIFICANT CHANGE UP (ref 8.4–10.5)
CHLORIDE SERPL-SCNC: 103 MMOL/L — SIGNIFICANT CHANGE UP (ref 96–108)
CO2 SERPL-SCNC: 27 MMOL/L — SIGNIFICANT CHANGE UP (ref 22–31)
CREAT SERPL-MCNC: 0.88 MG/DL — SIGNIFICANT CHANGE UP (ref 0.5–1.3)
EGFR: 105 ML/MIN/1.73M2 — SIGNIFICANT CHANGE UP
GLUCOSE SERPL-MCNC: 116 MG/DL — HIGH (ref 70–99)
HCT VFR BLD CALC: 43.8 % — SIGNIFICANT CHANGE UP (ref 39–50)
HGB BLD-MCNC: 14.5 G/DL — SIGNIFICANT CHANGE UP (ref 13–17)
MCHC RBC-ENTMCNC: 30.5 PG — SIGNIFICANT CHANGE UP (ref 27–34)
MCHC RBC-ENTMCNC: 33.1 GM/DL — SIGNIFICANT CHANGE UP (ref 32–36)
MCV RBC AUTO: 92.2 FL — SIGNIFICANT CHANGE UP (ref 80–100)
NRBC # BLD: 0 /100 WBCS — SIGNIFICANT CHANGE UP (ref 0–0)
PLATELET # BLD AUTO: 363 K/UL — SIGNIFICANT CHANGE UP (ref 150–400)
POTASSIUM SERPL-MCNC: 3.5 MMOL/L — SIGNIFICANT CHANGE UP (ref 3.5–5.3)
POTASSIUM SERPL-SCNC: 3.5 MMOL/L — SIGNIFICANT CHANGE UP (ref 3.5–5.3)
RBC # BLD: 4.75 M/UL — SIGNIFICANT CHANGE UP (ref 4.2–5.8)
RBC # FLD: 12.3 % — SIGNIFICANT CHANGE UP (ref 10.3–14.5)
SODIUM SERPL-SCNC: 143 MMOL/L — SIGNIFICANT CHANGE UP (ref 135–145)
WBC # BLD: 8.13 K/UL — SIGNIFICANT CHANGE UP (ref 3.8–10.5)
WBC # FLD AUTO: 8.13 K/UL — SIGNIFICANT CHANGE UP (ref 3.8–10.5)

## 2023-04-27 PROCEDURE — 75710 ARTERY X-RAYS ARM/LEG: CPT | Mod: 26

## 2023-04-27 PROCEDURE — C1887: CPT

## 2023-04-27 PROCEDURE — 93005 ELECTROCARDIOGRAM TRACING: CPT

## 2023-04-27 PROCEDURE — 75710 ARTERY X-RAYS ARM/LEG: CPT

## 2023-04-27 PROCEDURE — 36140 INTRO NDL ICATH UPR/LXTR ART: CPT | Mod: 59

## 2023-04-27 PROCEDURE — 99152 MOD SED SAME PHYS/QHP 5/>YRS: CPT

## 2023-04-27 PROCEDURE — 93010 ELECTROCARDIOGRAM REPORT: CPT

## 2023-04-27 PROCEDURE — C1769: CPT

## 2023-04-27 PROCEDURE — 36247 INS CATH ABD/L-EXT ART 3RD: CPT

## 2023-04-27 PROCEDURE — 80048 BASIC METABOLIC PNL TOTAL CA: CPT

## 2023-04-27 PROCEDURE — C1894: CPT

## 2023-04-27 PROCEDURE — 36415 COLL VENOUS BLD VENIPUNCTURE: CPT

## 2023-04-27 PROCEDURE — 85027 COMPLETE CBC AUTOMATED: CPT

## 2023-04-27 RX ORDER — ATORVASTATIN CALCIUM 80 MG/1
1 TABLET, FILM COATED ORAL
Qty: 0 | Refills: 0 | DISCHARGE
Start: 2023-04-27

## 2023-04-27 RX ORDER — METOPROLOL TARTRATE 50 MG
1 TABLET ORAL
Qty: 0 | Refills: 0 | DISCHARGE
Start: 2023-04-27

## 2023-04-27 RX ORDER — HYDROCHLOROTHIAZIDE 25 MG
1 TABLET ORAL
Qty: 0 | Refills: 0 | DISCHARGE
Start: 2023-04-27

## 2023-04-27 RX ORDER — TICAGRELOR 90 MG/1
90 TABLET ORAL
Refills: 0 | Status: DISCONTINUED | OUTPATIENT
Start: 2023-04-27 | End: 2023-05-11

## 2023-04-27 RX ORDER — AMLODIPINE BESYLATE 2.5 MG/1
1 TABLET ORAL
Qty: 0 | Refills: 0 | DISCHARGE
Start: 2023-04-27

## 2023-04-27 RX ORDER — SODIUM CHLORIDE 9 MG/ML
1000 INJECTION INTRAMUSCULAR; INTRAVENOUS; SUBCUTANEOUS
Refills: 0 | Status: DISCONTINUED | OUTPATIENT
Start: 2023-04-27 | End: 2023-05-11

## 2023-04-27 RX ORDER — TICAGRELOR 90 MG/1
1 TABLET ORAL
Qty: 0 | Refills: 0 | DISCHARGE
Start: 2023-04-27

## 2023-04-27 RX ORDER — ASPIRIN/CALCIUM CARB/MAGNESIUM 324 MG
1 TABLET ORAL
Qty: 0 | Refills: 0 | DISCHARGE
Start: 2023-04-27

## 2023-04-27 RX ORDER — EZETIMIBE 10 MG/1
1 TABLET ORAL
Qty: 0 | Refills: 0 | DISCHARGE
Start: 2023-04-27

## 2023-04-27 RX ADMIN — SODIUM CHLORIDE 75 MILLILITER(S): 9 INJECTION INTRAMUSCULAR; INTRAVENOUS; SUBCUTANEOUS at 15:18

## 2023-04-27 RX ADMIN — TICAGRELOR 90 MILLIGRAM(S): 90 TABLET ORAL at 17:16

## 2023-04-27 NOTE — ASU DISCHARGE PLAN (ADULT/PEDIATRIC) - ASU DC SPECIAL INSTRUCTIONSFT
Please see preprinted discharge instruction sheet    You will be scheduled by Dr. Caba for a repeat procedure in the next few weeks.

## 2023-04-27 NOTE — ASU DISCHARGE PLAN (ADULT/PEDIATRIC) - NS MD DC FALL RISK RISK
English
For information on Fall & Injury Prevention, visit: [email or web address] OR  [email or web address] OR  https://www.cdc.gov/steadi/patient.html
Rhomboid Transposition Flap Text: The defect edges were debeveled with a #15 scalpel blade.  Given the location of the defect and the proximity to free margins a rhomboid transposition flap was deemed most appropriate.  Using a sterile surgical marker, an appropriate rhomboid flap was drawn incorporating the defect.    The area thus outlined was incised deep to adipose tissue with a #15 scalpel blade.  The skin margins were undermined to an appropriate distance in all directions utilizing iris scissors.

## 2023-04-27 NOTE — ASU DISCHARGE PLAN (ADULT/PEDIATRIC) - PROVIDER TOKENS
PROVIDER:[TOKEN:[[id number]],FOLLOWUP:[Routine],ESTABLISHEDPATIENT:[T]],FREE:[LAST:[Araujo],FIRST:[Dr. Vergara],PHONE:[phone]],FAX:[(   )    -],ADDRESS:[[address] [place] [address]],SCHEDULEDAPPT:[06/27/2023],SCHEDULEDAPPTTIME:[04:15 PM],ESTABLISHEDPATIENT:[T]]

## 2023-04-27 NOTE — PROVIDER CONTACT NOTE (OTHER) - ASSESSMENT
A&0x4, +1 dorsalis pedis pulse as per baseline. extremity is warm, pink in color, mobile. denies tingling.

## 2023-04-27 NOTE — CHART NOTE - NSCHARTNOTEFT_GEN_A_CORE
Removal of Femoral Sheath    Pulses in the right/left lower extremity are audible by doppler. The patient was placed in the supine position. The insertion site was identified and the sutures were removed per protocol.  The 5 Hebrew femoral sheath was then removed. Direct pressure was applied for  20 minutes.     Monitoring of the right/left groin and right tibial puncture site and both lower extremities including neuro-vascular checks and vital signs every 15 minutes x 4, then every 30 minutes x 2, then every 1 hour x 2 then every 4 hours x 72 hours was ordered.    Complications: None    Comments:    Continue DAPT, Statin, BB/CCB   Monitor site and neurovascular status as per protocol  If pt stable may d/c home after ambulation and required bedrest at about 1930.  Will be rescheduled for next procedure in a few weeks.    Talita [facility]-BC  Cardiology

## 2023-04-27 NOTE — H&P CARDIOLOGY - HISTORY OF PRESENT ILLNESS
47 M with CAD s/p CABG x 2 (LIMA to LAD, L Rad to OM), preserved EF, HTN,  PAD with known R and L SFA  with claudication and had an unsuccessful peripheral angiogram on 4/13/23 who presents for for planned intervention of the [facility]  with pedal access with Dr. Brody Orr. Patient has claudication after walking a little over a block. He has had a toe ulcer in the past that took many months to heal. He had a CTA and arterial duplex done previously. He has known  of the bilateral SFAs. He denies any CP, SOB or palpitations. He walks every day; has to stop for relief from pain but then keeps walking. No active wounds now. He is on DAPT, high intensity statin and zetia. Now presents for peripheral angiogram.              Card: Josette Tabor Drtza              Summary:   1. Normal global left ventricular systolic function.   2. Left ventricular ejection fraction, by visual estimation, is 65 to   70%.   3. Mildly increased LV wall thickness.   4. Normal left ventricular internal cavity size.   5. Normal right ventricular size and function.   6. The left atrium is normal in size.   7. The right atrium is normal in size.   8. Mild mitral valve regurgitation.   9. Aortic valve leaflet calcification. No aortic valve stenosis.  10. There is no evidence of pericardial effusion.    < from: Invasive Peripheral Vascular Reporting (04.13.23 @ 08:16) >  Conclusions:   s/p BMS to the RCIA to REIA, unsuccessful attempt at revascularization  at retrograde approach to the [facility]     Recommendations:     1. Monitor access site.    2. GDMT. RF modification. SET.   3. Set-up in 2-3 weeks for planned intervention of the [facility]  with  pedal access.    < end of copied text >

## 2023-04-27 NOTE — ASU PATIENT PROFILE, ADULT - FALL HARM RISK - UNIVERSAL INTERVENTIONS
Bed in lowest position, wheels locked, appropriate side rails in place/Call bell, personal items and telephone in reach/Instruct patient to call for assistance before getting out of bed or chair/Non-slip footwear when patient is out of bed/[place] to call system/Physically safe environment - no spills, clutter or unnecessary equipment/Purposeful Proactive Rounding/Room/bathroom lighting operational, light cord in reach

## 2023-04-27 NOTE — ASU DISCHARGE PLAN (ADULT/PEDIATRIC) - CARE PROVIDER_API CALL
Sofia Chanel (MD; PhD)  Internal Medicine  [address] [place] [address]  Phone: [phone]  Fax: [phone]  Established Patient  Follow Up Time: Routine    Dr. Ursula Araujo  [address] [place] [address]  Phone: [phone]  Fax: (   )    -  Established Patient  Scheduled Appointment: 06/27/2023 04:15 PM

## 2023-06-07 ENCOUNTER — OUTPATIENT (OUTPATIENT)
Dept: OUTPATIENT SERVICES | Facility: HOSPITAL | Age: 48
LOS: 1 days | End: 2023-06-07
Payer: SELF-PAY

## 2023-06-07 ENCOUNTER — NON-APPOINTMENT (OUTPATIENT)
Age: 48
End: 2023-06-07

## 2023-06-07 ENCOUNTER — APPOINTMENT (OUTPATIENT)
Dept: CARDIOLOGY | Facility: HOSPITAL | Age: 48
End: 2023-06-07

## 2023-06-07 VITALS
HEART RATE: 79 BPM | DIASTOLIC BLOOD PRESSURE: 78 MMHG | HEIGHT: 67 IN | BODY MASS INDEX: 28.72 KG/M2 | WEIGHT: 183 LBS | OXYGEN SATURATION: 98 % | SYSTOLIC BLOOD PRESSURE: 134 MMHG

## 2023-06-07 DIAGNOSIS — I25.10 ATHEROSCLEROTIC HEART DISEASE OF NATIVE CORONARY ARTERY WITHOUT ANGINA PECTORIS: ICD-10-CM

## 2023-06-07 DIAGNOSIS — Z95.1 PRESENCE OF AORTOCORONARY BYPASS GRAFT: Chronic | ICD-10-CM

## 2023-06-07 PROCEDURE — G0463: CPT

## 2023-06-07 PROCEDURE — 93005 ELECTROCARDIOGRAM TRACING: CPT

## 2023-06-11 NOTE — ASSESSMENT
[FreeTextEntry1] : CAD (coronary artery disease) (414.00) (I25.10)\par  · - c/w Aspirin 81mg and brillinta 90mg BID, metoprolol\par  succ 50mg daily, atorvastatin 80mg daily. \par  -improved LDL <100 on most recent lipid profile, continue ezetimibe for goal LDL <70;\par  f/u lipid profile now\par PAD (peripheral artery disease) (443.9) (I73.9)\par  · -CT with R SFA and L SFA with collateral circulation; currently undergoing exercise\par  program\par  -pt s/p interventions with Dr. Orr with plans to go back for another intervention. DAPT plan per Vascular team given his PAD\par \par \par

## 2023-06-11 NOTE — REASON FOR VISIT
[FreeTextEntry1] : 47 M with CAD s/p CABG x 2 (LIMA to LAD, L Rad to OM), preserved EF, PAD, HTN, presents for follow up. He is compliant with DAPT, statin. He denies chest pain, SOB, PND or orthopnea. SInce his last visit, he started ezetemibe. He still reports claudication with walking up a hill near his home. \par \par He has been following vascular cardiology for his PAD, "given persistent claudication despite walking and intensification of GDMT underwent diagnostic peripheral angiography, s/p RCIA and REIA stent, unable to cross RSFA , plan for pedal approach to open up RSFA  given unable to cross from above"\par \par \par

## 2023-06-19 ENCOUNTER — TRANSCRIPTION ENCOUNTER (OUTPATIENT)
Age: 48
End: 2023-06-19

## 2023-06-20 ENCOUNTER — TRANSCRIPTION ENCOUNTER (OUTPATIENT)
Age: 48
End: 2023-06-20

## 2023-06-20 ENCOUNTER — OUTPATIENT (OUTPATIENT)
Dept: OUTPATIENT SERVICES | Facility: HOSPITAL | Age: 48
LOS: 1 days | End: 2023-06-20
Payer: MEDICAID

## 2023-06-20 VITALS
TEMPERATURE: 98 F | WEIGHT: 165.35 LBS | HEIGHT: 66 IN | SYSTOLIC BLOOD PRESSURE: 145 MMHG | DIASTOLIC BLOOD PRESSURE: 78 MMHG | OXYGEN SATURATION: 98 %

## 2023-06-20 VITALS
RESPIRATION RATE: 15 BRPM | OXYGEN SATURATION: 99 % | HEART RATE: 76 BPM | SYSTOLIC BLOOD PRESSURE: 148 MMHG | DIASTOLIC BLOOD PRESSURE: 80 MMHG

## 2023-06-20 DIAGNOSIS — I73.9 PERIPHERAL VASCULAR DISEASE, UNSPECIFIED: ICD-10-CM

## 2023-06-20 DIAGNOSIS — Z95.1 PRESENCE OF AORTOCORONARY BYPASS GRAFT: Chronic | ICD-10-CM

## 2023-06-20 LAB
ANION GAP SERPL CALC-SCNC: 16 MMOL/L — SIGNIFICANT CHANGE UP (ref 5–17)
BUN SERPL-MCNC: 16 MG/DL — SIGNIFICANT CHANGE UP (ref 7–23)
CALCIUM SERPL-MCNC: 9.6 MG/DL — SIGNIFICANT CHANGE UP (ref 8.4–10.5)
CHLORIDE SERPL-SCNC: 101 MMOL/L — SIGNIFICANT CHANGE UP (ref 96–108)
CO2 SERPL-SCNC: 21 MMOL/L — LOW (ref 22–31)
CREAT SERPL-MCNC: 0.82 MG/DL — SIGNIFICANT CHANGE UP (ref 0.5–1.3)
EGFR: 108 ML/MIN/1.73M2 — SIGNIFICANT CHANGE UP
GLUCOSE SERPL-MCNC: 103 MG/DL — HIGH (ref 70–99)
HCT VFR BLD CALC: 45 % — SIGNIFICANT CHANGE UP (ref 39–50)
HGB BLD-MCNC: 15.5 G/DL — SIGNIFICANT CHANGE UP (ref 13–17)
MCHC RBC-ENTMCNC: 31.2 PG — SIGNIFICANT CHANGE UP (ref 27–34)
MCHC RBC-ENTMCNC: 34.4 GM/DL — SIGNIFICANT CHANGE UP (ref 32–36)
MCV RBC AUTO: 90.5 FL — SIGNIFICANT CHANGE UP (ref 80–100)
NRBC # BLD: 0 /100 WBCS — SIGNIFICANT CHANGE UP (ref 0–0)
PLATELET # BLD AUTO: 240 K/UL — SIGNIFICANT CHANGE UP (ref 150–400)
POTASSIUM SERPL-MCNC: 3.5 MMOL/L — SIGNIFICANT CHANGE UP (ref 3.5–5.3)
POTASSIUM SERPL-SCNC: 3.5 MMOL/L — SIGNIFICANT CHANGE UP (ref 3.5–5.3)
RBC # BLD: 4.97 M/UL — SIGNIFICANT CHANGE UP (ref 4.2–5.8)
RBC # FLD: 13 % — SIGNIFICANT CHANGE UP (ref 10.3–14.5)
SODIUM SERPL-SCNC: 138 MMOL/L — SIGNIFICANT CHANGE UP (ref 135–145)
WBC # BLD: 8.04 K/UL — SIGNIFICANT CHANGE UP (ref 3.8–10.5)
WBC # FLD AUTO: 8.04 K/UL — SIGNIFICANT CHANGE UP (ref 3.8–10.5)

## 2023-06-20 PROCEDURE — C1894: CPT

## 2023-06-20 PROCEDURE — C1887: CPT

## 2023-06-20 PROCEDURE — C1874: CPT

## 2023-06-20 PROCEDURE — C1725: CPT

## 2023-06-20 PROCEDURE — C1760: CPT

## 2023-06-20 PROCEDURE — 37226: CPT

## 2023-06-20 PROCEDURE — 75716 ARTERY X-RAYS ARMS/LEGS: CPT | Mod: 59

## 2023-06-20 PROCEDURE — 85027 COMPLETE CBC AUTOMATED: CPT

## 2023-06-20 PROCEDURE — C1769: CPT

## 2023-06-20 PROCEDURE — 93005 ELECTROCARDIOGRAM TRACING: CPT

## 2023-06-20 PROCEDURE — C2623: CPT

## 2023-06-20 PROCEDURE — 80048 BASIC METABOLIC PNL TOTAL CA: CPT

## 2023-06-20 RX ORDER — TICAGRELOR 90 MG/1
90 TABLET ORAL EVERY 12 HOURS
Refills: 0 | Status: DISCONTINUED | OUTPATIENT
Start: 2023-06-20 | End: 2023-06-20

## 2023-06-20 RX ORDER — SODIUM CHLORIDE 9 MG/ML
1000 INJECTION INTRAMUSCULAR; INTRAVENOUS; SUBCUTANEOUS
Refills: 0 | Status: DISCONTINUED | OUTPATIENT
Start: 2023-06-20 | End: 2023-06-20

## 2023-06-20 RX ADMIN — TICAGRELOR 90 MILLIGRAM(S): 90 TABLET ORAL at 21:02

## 2023-06-20 RX ADMIN — SODIUM CHLORIDE 75 MILLILITER(S): 9 INJECTION INTRAMUSCULAR; INTRAVENOUS; SUBCUTANEOUS at 16:03

## 2023-06-20 NOTE — ASU PREOP CHECKLIST - HEART RATE (BEATS/MIN)
Okay I have someone else I can put and her spot    Next available for her will be Friday January 18th
Please take care of
Please talk to Dr Sandoval Asencio
Pt had to cancel surgery   Mare Aguirre did it already
Take care of what?  I dont know anything about this
77

## 2023-06-20 NOTE — ASU PATIENT PROFILE, ADULT - BRADEN SCORE (IF 18 OR LESS ACTIVATE SKIN INJURY RISK INCREASED GUIDELINE), MLM
[facility] INITIAL VISIT      PCP: CAMILA Heller    Chief Complaint   Patient presents with   • Pain     Right sided sciatic nerve pain       HPI:  Dallin Baeza is a 26 year old male with chronic low back and right leg pain as described below. Referred by CAMILA Heller for consultation and management.    Pain Score (0-10): Current 3/10;  Worst 8/10;  Least 4/10;  Average 6/10;  Acceptable 1/10  Duration: 2011  Context of pain: Pain started following an active lifestyle including lifting, hiking, and strenuous physical activity  Location: low back   Radiation: right leg and foot   Quality: sharp, shooting, stabbing  Improves: laying supine   Exacerbates:ý walking, prolonged standing/sitting    Numbness/Tingling: right leg   Weakness: none   Sleep: 8 hours   Mood: depressed, sad, irritable   ý  Bowel and bladder - Denies new onset incontinence, or saddle anesthesia    Interventions:  1. Injections:    · None  2. Physical Therapy: Currently in a structured PT program.  3. Surgeries:   · Right ankle reconstruction   · Left JAKI  4. Medications (pain/mood/depression): (with doses, duration of use, side effects, etc...)  Current  · Tramadol (ULTRAM) 50 mg 1 tablet by mouth every 8 hours as needed for pain \"arthritis pain as needed, doesn't really help for my back\"  · Meloxicam (MOBIC) 15 mg tablet by mouth daily - Patient used for arthritis pain and is unsure if it is helping  · Ibuprofen and Aleve - \"makes the pain more manageable\"      Previous:  · Cyclobenzaprine - Patient used for sleep in the past \"relaxing, helped with sleep\"  · Hydrocodone & Oxycodone for post surgery   5. Other:      Patient Active Problem List   Diagnosis   • SO-JOANN (systemic onset juvenile idiopathic arthritis) (CMS/HCC)   • AVN (avascular necrosis of bone) (CMS/HCC)   • Immunocompromised state (CMS/HCC)   • Left shoulder pain   • Still's disease (CMS/HCC)   • Long-term use of high-risk medication   • Lumbar  radiculitis       Past Medical History:   Diagnosis Date   • Lumbar radiculitis 9/24/2018   • Rheumatoid arthritis (CMS/HCC)    • Systemic juvenile idiopathic arthritis (CMS/HCC)        Current Outpatient Medications   Medication   • Tocilizumab (ACTEMRA IV)   • traMADol (ULTRAM) 50 MG tablet   • meloxicam (MOBIC) 15 MG tablet   • triamcinolone (KENALOG) 0.5 % ointment     No current facility-administered medications for this visit.        ALLERGIES:   Allergen Reactions   • Gammaplex [Immune Globulin (Human)] ANAPHYLAXIS       Social History     Tobacco Use   • Smoking status: Never Smoker   • Smokeless tobacco: Never Used   Substance Use Topics   • Alcohol use: Not on file   • Drug use: Not on file       Family History   Problem Relation Age of Onset   • Autoimmune Disease Mother         sweets syndrome   • Myocardial Infarction Father    • Rheumatoid Arthritis Sister          REVIEW OF SYSTEMS (bold is positive):  CONST: No fever, sweats, chills, weight changes, fatigueý   ýEYES: No loss of vision or diplopiaý  ýENT: No hearing loss, tinnitus, ear pain, ear drainage. No nasal discharge. No sore throat, hoarsenessý  ýCV: No chest pain, palpitations, orthopnea, claudication or PNDý  ýRESP: No dyspnea, Shortness of breath, wheezing, cough, sputum production or hemoptysisý  ýGI: No constipation, diarrhea, stool incontinence, nausea vomitingý  ýGU: No dysuria, hematuria,urinary incontinence ý  ýMSK: Per HPI  ýSKIN: No rash, mole changes, itching  ýNEURO: No headache, dizziness, seizures, syncope  ýPSYCH: No anxiety, depression or suicidal ideationý  ýENDO: No polyuria or polydipsia, No heat intoleranceý  ýHEM/LYMPH: No easy bruising or bleeding, No lymphadenopathyý  ýALLERGIC/IMMUNE:, No urticaria or hives, No new allergies ý  ýý  SOAPP: 6    Oswestry Disability  50 %      PHYSICAL EXAMINATION:  Constitutional: NAD  Psychiatric: Alert, awake, and oriented   Ears, nose, mouth, and throat: atraumatic, mucous membranes  moist  Resp: Normal respiratory excursion  Cardiovascular: Palpable peripheral pulses  Extremities: No c/c/e  Skin: Warm, dry, intact. no rashes  ýMSK: Full ROM in all major joints  ýý  Low Back Exam:  Visual Exam: No gross atrophy appreciated, no rash appreciated  No tenderness to palpation in area of lumbar paraspinal area  Normal and painless lumbar flexion/extension   Facet Loading - Positive B/L  SLR - Negative B/Lýýýýýý ý    Hip  SIJ non tender B/L  GTB non tender B/L  Donnell's Test - Negative B/L  Gait- normal, no antalgia  ý  Neurologic Exam:  Sensory Exam: Grossly intact throughout bilateral lower extremity L1-S1   Reflex: +2 reflexes at patella (L4) and achilles (S1)     Motor:   Lower extremity 5/5 strength: with hip flexion (L1/L2), knee extension (L3/L4), great toe dorsiflexion (L5), foot plantarflexion (S1)     Imaging studies:      ASSESSMENT AND PLAN:  Dallin Baeza is a 26 year old male with past medical history significant of rheumatoid arthritis. He reports a low back pain that radiates to his right lower extremity and into his foot. Due to his RA, he has undergone a left total hip arthroplasty, right total ankle reconstruction and there are talks of a right total hip arthroplasty and possible shoulder surgery. Based on today's evaluation he likely has neuropathic pain possibly from lumbar radiculitis. Our multimodal treatment plan as detailed below    Diagnosis:   1. Lumbar radiculitis        Plan:  1. Medications:   Begin Gabapentin 300 mg PO qHS for 2 days. Then increase Gabapentin to 300 mg PO BID on day 3-5. Then increase Gabapentin to 300 mg PO TID on day 6 and beyond.     2. Diagnostics: Lumbar MRI  3. Interventions: Lumbar POLA L4-5, right parasaggital   4. Devices: None indicated at this time.  5. Consults: None indicated at this time.  6. Physical Therapy: Prescribed a structured PT program for lumbar stabilization exercises, core strengthening exercises, postural training, hip girdle  stretches and strengthening exercises- first appointment tomorrow.   7. Opioid Risk Assessment:   · Pain psych assessment:  · Urine tox screen N/A  · Prescription Monitoring Program verification performed at this visit on 09/24/18  · Opioid contract N/A  · Discussed with patient the risks/benefits of opioids  8. Follow-up in 4 weeks     Pablito Flores MD, D. ASIF  Interventional Pain Medicine  [place], [place]    Future Appointments   Date Time Provider Department Center   9/25/2018  9:30 AM Anastasia Villalobos, PT [id number] MCO   9/27/2018  8:30 AM [facility] FDL CHEMO CHAIR MCOON2 MCO   10/3/2018 10:30 AM Raquel Garcia MD [id number] FDL   10/8/2018  7:00 AM Brian Rose MD OMG OJS   11/28/2018  9:15 AM Raquel Garcia MD FDLRHE [facility]DL   1/22/2019  8:30 AM FDL XRAY FDLRAD [facility]DL   1/22/2019  9:00 AM Scar Parsons DPM FDLPOD FDL   9/23/2019  9:20 AM CAMILA Heller FDLFP FDL         Justification for interventional therapy:  · Patient with average pain > 6/10  · Patient has exhausted conservative therapy  · Patient continuing home exercise program.    The risks, consequences, alternatives, and benefits of various treatment options were discussed with the patient in great detail, including conservative management, injections and procedures.    DEPRESSION ASSESSMENT/PLAN: Performed  Over the last 2 weeks, how often have you been bothered by the following problems?         PHQ2 Score:  3  1. Little interest or pleasure in doing things?:  More than half the days  2. Feeling down, depressed, or hopeless?:  Several days     PHQ9 Score:  10  3. Trouble falling, staying asleep or sleeping too much?:  Nearly every day  4. Feeling tired or having little energy?:  Not at all  5. Poor appetite or overeating?:  Several days  6. Feeling bad about yourself - or that you are a failure or that you have let yourself or your family down?:  Not at all  7. Trouble concentrating on things such as reading a newspaper or  watching television?:  Not at all  8. Moving or speaking so slowly that other people could have noticed? Or the opposite - being so fidgety or restless that you were moving around a lot more than usual?:  Nearly every day  9. Thoughts that you would be better off dead, or of hurting yourself in some way?:  Not at all     If you checked off any problems, how difficult have these problems made it for you to do your work, take care of tings at home, or get along with other people?:  very difficult  0 - 4 = Normal  5 - 9 = Mild Symptoms  10 - 14 = Moderate Symptoms  15 - 19 = Moderate - Severe Symptoms  20 - 27 = Severe Symptoms                              22

## 2023-06-20 NOTE — H&P CARDIOLOGY - NSICDXPASTSURGICALHX_GEN_ALL_CORE_FT
PAST SURGICAL HISTORY:  S/P CABG (coronary artery bypass graft) CABG x 2 (LIMA to LAD, L Rad to OM),

## 2023-06-20 NOTE — ASU DISCHARGE PLAN (ADULT/PEDIATRIC) - CARE PROVIDER_API CALL
Sofia Caba [facility]  Cardiovascular Disease  [address] [place] [address]  Phone: [phone]  Fax: [phone]  Established Patient  Follow Up Time: 2 weeks

## 2023-06-20 NOTE — H&P CARDIOLOGY - HISTORY OF PRESENT ILLNESS
47 M with CAD s/p CABG x 2 (LIMA to LAD, L Rad to OM), preserved EF, HTN,  PAD with known R and L SFA  with claudication and had an unsuccessful peripheral angiogram on 4/13/23 who presents for for planned intervention. Pt had unsuccessful peripheral intervention 4/27 RSFA .     Patient has claudication after walking a little over a block. He has had a toe ulcer in the past that took many months to heal. He had a CTA and arterial duplex done previously. He has known  of the bilateral SFAs. He denies any CP, SOB or palpitations. He walks every day; has to stop for relief from pain but then keeps walking. No active wounds now. He is on DAPT, high intensity statin and zetia. Now presents for peripheral angiogram.  Diagnostic Conclusions:   TASC D RSFA , unsuccessful attempt at revascularization. Crossed  distal cap but unable to cross proximal cap from pedal  access.    Recommendations:   1. Monitor access sites.    2. RF modification. GDMT. SET. Follow-up in outpatient office. Allow  SFA to heal and likely recanalize. Will bring back in 4  weeks to re-attempt.    General Impressions:   Patient: JENIFFER HE        MRN: [id number]                Study  Date: 04/27/2023   09:52     Page 1 of 4    AM                    47 M with CAD s/p CABG x 2 (LIMA to LAD, L Rad to OM), preserved EF, HTN,  PAD with known R and L SFA  with claudication and had an unsuccessful peripheral angiogram on 4/13/23 who presents for for planned intervention. Pt had unsuccessful peripheral intervention 4/27 RSFA .     Patient has claudication after walking a little over a block. He has had a toe ulcer in the past that took many months to heal. He had a CTA and arterial duplex done previously. He has known  of the bilateral SFAs. He denies any CP, SOB or palpitations. He walks every day; has to stop for relief from pain but then keeps walking. No active wounds now. He is on DAPT, high intensity statin and zetia. Now presents for peripheral angiogram.  Diagnostic Conclusions:   4/27/2023  TASC D RSFA , unsuccessful attempt at revascularization. Crossed  distal cap but unable to cross proximal cap from pedal  access.    Recommendations:   1. Monitor access sites.    2. RF modification. GDMT. SET. Follow-up in outpatient office. Allow  SFA to heal and likely recanalize. Will bring back in 4  weeks to re-attempt.    General Impressions:   Patient: JENIFFER HE        MRN: [id number]                Study  Date: 04/27/2023   09:52     Page 1 of 4    AM

## 2023-06-20 NOTE — ASU DISCHARGE PLAN (ADULT/PEDIATRIC) - NS MD DC FALL RISK RISK
For information on Fall & Injury Prevention, visit: [email or web address] OR  [email or web address].[email or web address]-injury OR  https://www.cdc.gov/steadi/patient.html

## 2023-06-21 ENCOUNTER — NON-APPOINTMENT (OUTPATIENT)
Age: 48
End: 2023-06-21

## 2023-06-23 NOTE — PLAN
[FreeTextEntry1] : 47 M with CAD s/p CABG x 2 (LIMA to LAD, L Rad to OM), preserved EF, PAD, HTN, presents for follow up\par \par # CAD: s/p CABG:\par -c/w Aspirin 81mg and brillinta 90mg BID (DAPT for at least 6 months), metoprolol succ 50mg daily, atorvastatin 80mg daily\par -last a1c 6 and  in 2/2022. Repeat of A1C and Lipid profile done.\par \par # PAD: with claudication\par -follow up with vascular surgery \par - Continue ASA and Brillanta\par \par #HTN\par - BP today 127/80\par - continue HCTZ and amlodipine\par \par rtc in 3 months for HTN follow up\par \par d/w Dr. Camacho\par

## 2023-06-23 NOTE — HISTORY OF PRESENT ILLNESS
[FreeTextEntry1] : s/p CABG follow up [de-identified] : This is a 46M with strong family Hx of CAD (both parents with CABG at 60's), HTN, smoker (1ppd x 20 years, quit 1 month ago), complained of worsening of leg pain while walking was diagnosed with PAD after abnormal VIN in Jan 2022, recently had to be treated with IV and PO antibiotics for Rt 5th toe non healing ulcer in Jan 2022 (pt followed with vascular dr Chino).Patient also endorsed intermittent CP for weeks and was evaluated by Dr Shea and underwent stress test on 3/9/22. Stress test showed moderate to severe reversible perfusion in the apical cap, apical inferior, mid inferior wall, basal inferior wall suggestive of ischemia and referred for [facility]. CT Surgery Dr. Laguerre consulted for CABG evaluation given multi-vessel CAD seen on C. He is s/p CABGx2 [LIMA-LAD, L Rad = OM] performed on 3/25. Post-op course uncomplicated. Pt DC'd home on 3/29. Now seen at home today (4/5) recovering well w/ no complaints besides some pain which is relieved by Oxycodone at night. Emotional support and education provided. All questions answered.\par

## 2023-07-10 ENCOUNTER — EMERGENCY (EMERGENCY)
Facility: HOSPITAL | Age: 48
LOS: 1 days | Discharge: HOME | End: 2023-07-10
Attending: EMERGENCY MEDICINE
Payer: MEDICAID

## 2023-07-10 VITALS
TEMPERATURE: 98 F | RESPIRATION RATE: 18 BRPM | DIASTOLIC BLOOD PRESSURE: 95 MMHG | OXYGEN SATURATION: 98 % | SYSTOLIC BLOOD PRESSURE: 183 MMHG | HEART RATE: 83 BPM | WEIGHT: 165.35 LBS | HEIGHT: 66 IN

## 2023-07-10 DIAGNOSIS — Z95.1 PRESENCE OF AORTOCORONARY BYPASS GRAFT: Chronic | ICD-10-CM

## 2023-07-10 LAB
ALBUMIN SERPL ELPH-MCNC: 4.9 G/DL — SIGNIFICANT CHANGE UP (ref 3.3–5)
ALP SERPL-CCNC: 77 U/L — SIGNIFICANT CHANGE UP (ref 40–120)
ALT FLD-CCNC: 202 U/L — HIGH (ref 10–45)
ANION GAP SERPL CALC-SCNC: 15 MMOL/L — SIGNIFICANT CHANGE UP (ref 5–17)
APTT BLD: 30.7 SEC — SIGNIFICANT CHANGE UP (ref 27.5–35.5)
AST SERPL-CCNC: 128 U/L — HIGH (ref 10–40)
BASOPHILS # BLD AUTO: 0.04 K/UL — SIGNIFICANT CHANGE UP (ref 0–0.2)
BASOPHILS NFR BLD AUTO: 0.5 % — SIGNIFICANT CHANGE UP (ref 0–2)
BILIRUB SERPL-MCNC: 0.4 MG/DL — SIGNIFICANT CHANGE UP (ref 0.2–1.2)
BUN SERPL-MCNC: 22 MG/DL — SIGNIFICANT CHANGE UP (ref 7–23)
CALCIUM SERPL-MCNC: 10 MG/DL — SIGNIFICANT CHANGE UP (ref 8.4–10.5)
CHLORIDE SERPL-SCNC: 103 MMOL/L — SIGNIFICANT CHANGE UP (ref 96–108)
CO2 SERPL-SCNC: 24 MMOL/L — SIGNIFICANT CHANGE UP (ref 22–31)
CREAT SERPL-MCNC: 0.86 MG/DL — SIGNIFICANT CHANGE UP (ref 0.5–1.3)
EGFR: 105 ML/MIN/1.73M2 — SIGNIFICANT CHANGE UP
EOSINOPHIL # BLD AUTO: 0.25 K/UL — SIGNIFICANT CHANGE UP (ref 0–0.5)
EOSINOPHIL NFR BLD AUTO: 3 % — SIGNIFICANT CHANGE UP (ref 0–6)
GLUCOSE SERPL-MCNC: 101 MG/DL — HIGH (ref 70–99)
HCT VFR BLD CALC: 43.3 % — SIGNIFICANT CHANGE UP (ref 39–50)
HGB BLD-MCNC: 14.5 G/DL — SIGNIFICANT CHANGE UP (ref 13–17)
IMM GRANULOCYTES NFR BLD AUTO: 0.6 % — SIGNIFICANT CHANGE UP (ref 0–0.9)
INR BLD: 1.07 RATIO — SIGNIFICANT CHANGE UP (ref 0.88–1.16)
LYMPHOCYTES # BLD AUTO: 3.58 K/UL — HIGH (ref 1–3.3)
LYMPHOCYTES # BLD AUTO: 43.2 % — SIGNIFICANT CHANGE UP (ref 13–44)
MCHC RBC-ENTMCNC: 30.7 PG — SIGNIFICANT CHANGE UP (ref 27–34)
MCHC RBC-ENTMCNC: 33.5 GM/DL — SIGNIFICANT CHANGE UP (ref 32–36)
MCV RBC AUTO: 91.7 FL — SIGNIFICANT CHANGE UP (ref 80–100)
MONOCYTES # BLD AUTO: 0.89 K/UL — SIGNIFICANT CHANGE UP (ref 0–0.9)
MONOCYTES NFR BLD AUTO: 10.7 % — SIGNIFICANT CHANGE UP (ref 2–14)
NEUTROPHILS # BLD AUTO: 3.47 K/UL — SIGNIFICANT CHANGE UP (ref 1.8–7.4)
NEUTROPHILS NFR BLD AUTO: 42 % — LOW (ref 43–77)
NRBC # BLD: 0 /100 WBCS — SIGNIFICANT CHANGE UP (ref 0–0)
NT-PROBNP SERPL-SCNC: <36 PG/ML — SIGNIFICANT CHANGE UP (ref 0–300)
PLATELET # BLD AUTO: 293 K/UL — SIGNIFICANT CHANGE UP (ref 150–400)
POTASSIUM SERPL-MCNC: 3.9 MMOL/L — SIGNIFICANT CHANGE UP (ref 3.5–5.3)
POTASSIUM SERPL-SCNC: 3.9 MMOL/L — SIGNIFICANT CHANGE UP (ref 3.5–5.3)
PROT SERPL-MCNC: 8.1 G/DL — SIGNIFICANT CHANGE UP (ref 6–8.3)
PROTHROM AB SERPL-ACNC: 12.3 SEC — SIGNIFICANT CHANGE UP (ref 10.5–13.4)
RBC # BLD: 4.72 M/UL — SIGNIFICANT CHANGE UP (ref 4.2–5.8)
RBC # FLD: 13.2 % — SIGNIFICANT CHANGE UP (ref 10.3–14.5)
SODIUM SERPL-SCNC: 142 MMOL/L — SIGNIFICANT CHANGE UP (ref 135–145)
TROPONIN T, HIGH SENSITIVITY RESULT: 10 NG/L — SIGNIFICANT CHANGE UP (ref 0–51)
WBC # BLD: 8.28 K/UL — SIGNIFICANT CHANGE UP (ref 3.8–10.5)
WBC # FLD AUTO: 8.28 K/UL — SIGNIFICANT CHANGE UP (ref 3.8–10.5)

## 2023-07-10 PROCEDURE — 93971 EXTREMITY STUDY: CPT

## 2023-07-10 PROCEDURE — 99285 EMERGENCY DEPT VISIT HI MDM: CPT

## 2023-07-10 PROCEDURE — 80053 COMPREHEN METABOLIC PANEL: CPT

## 2023-07-10 PROCEDURE — 85025 COMPLETE CBC W/AUTO DIFF WBC: CPT

## 2023-07-10 PROCEDURE — 83880 ASSAY OF NATRIURETIC PEPTIDE: CPT

## 2023-07-10 PROCEDURE — 85610 PROTHROMBIN TIME: CPT

## 2023-07-10 PROCEDURE — 84484 ASSAY OF TROPONIN QUANT: CPT

## 2023-07-10 PROCEDURE — 85730 THROMBOPLASTIN TIME PARTIAL: CPT

## 2023-07-10 PROCEDURE — 93005 ELECTROCARDIOGRAM TRACING: CPT

## 2023-07-10 PROCEDURE — 99285 EMERGENCY DEPT VISIT HI MDM: CPT | Mod: 25

## 2023-07-10 PROCEDURE — 93971 EXTREMITY STUDY: CPT | Mod: 26,RT

## 2023-07-10 NOTE — ED PROVIDER NOTE - IV ALTEPLASE EXCL REL HIDDEN
glimepiride (AMARYL) 4 MG tablet         Last Written Prescription Date: 5/17/17  Last Fill Quantity: 180, # refills: 0  Last Office Visit with [facility], P or [facility] prescribing provider:  4/14/17   Next 5 appointments (look out 90 days)     Sep 26, 2017  3:15 PM CDT   [facility] Visit with Lay Valencia MD   [facility]    [address] [place] [phone]            Nov 07, 2017  3:00 PM CST   Return Visit with Sami Hillman MD   [facility] ([facility])    [address] [place] [phone]                   BP Readings from Last 3 Encounters:   05/31/17 (!) 129/94   05/26/17 129/78   05/09/17 120/74     Lab Results   Component Value Date    MICROL 11 04/14/2017     Lab Results   Component Value Date    UMALCR 7.76 04/14/2017     Creatinine   Date Value Ref Range Status   05/23/2017 0.83 0.66 - 1.25 mg/dL Final   ]  GFR Estimate   Date Value Ref Range Status   05/23/2017 >90  Non  GFR Calc   >60 mL/min/1.7m2 Final   03/28/2017 >90  Non  GFR Calc   >60 mL/min/1.7m2 Final   09/24/2016 >90  Non  GFR Calc   >60 mL/min/1.7m2 Final     GFR Estimate If Black   Date Value Ref Range Status   05/23/2017 >90   GFR Calc   >60 mL/min/1.7m2 Final   03/28/2017 >90   GFR Calc   >60 mL/min/1.7m2 Final   09/24/2016 >90   GFR Calc   >60 mL/min/1.7m2 Final     Lab Results   Component Value Date    CHOL 102 04/14/2017     Lab Results   Component Value Date    HDL 35 04/14/2017     Lab Results   Component Value Date    LDL 35 04/14/2017     Lab Results   Component Value Date    TRIG 161 04/14/2017     Lab Results   Component Value Date    CHOLHDLRATIO 4.8 03/02/2015     Lab Results   Component Value Date    AST 20 05/23/2017     Lab Results   Component Value Date    ALT 41 05/23/2017     Lab Results   Component  Value Date    A1C 7.7 03/28/2017    A1C 7.4 09/16/2016    A1C 7.1 03/18/2016    A1C 7.5 10/30/2015    A1C 6.8 03/02/2015     Potassium   Date Value Ref Range Status   05/23/2017 3.6 3.4 - 5.3 mmol/L Final         Edna James  [facility]       show

## 2023-07-10 NOTE — CONSULT NOTE ADULT - SUBJECTIVE AND OBJECTIVE BOX
VASCULAR SURGERY CONSULT NOTE    HPI: 48M PMH PAD, CAD sp PTCA s/p R superficial femoral artery angioplasty, and stents placed p/t ED for persistent RLE edema.     In the ED, patient was afebrile, VSS WBC 8.28 U/S shows patent RLE stents, no DVTs with known L SFA occlusion.      PMH/PSH: HTN (hypertension)    PAD (peripheral artery disease)    S/P CABG (coronary artery bypass graft)     MEDS:    ALLERGIES:    REVIEW OF SYSTEMS: All ROS negative except as per HPI.  ____________________________________________    VITALS:T(C): 36.9, Max: 36.9 (07-10)  T(F): 98.5, Max: 98.5 (07-10)  HR: 75 (75 - 83)  BP: 147/88 (147/88 - 183/95)  BP(mean): 108 (108 - 108)  ABP: --  ABP(mean): --  RR: 17 (17 - 18)  SpO2: 99% (98% - 99%)  CVP(mm Hg): --  CVP(cm H2O): --  room air          PHYSICAL EXAM:  General: AAOx3, no acute distress.  Respiratory: breathing comfortably, no increased WOB   Abdomen: soft, nontender, nondistended, no rebound, no guarding  Extremities: Moves all four.   ____________________________________________    LABS:                      14.5  8.28 )-----------( 293    ( 10 Jul 2023 21:20 )             43.3  142  |  103  |  22  ----------------------------<  101<H>    (07-10)  3.9   |  24  |  0.86          Ca    10.0      07-10  Mg    x  Phos  x        LIVER FUNCTIONS - ( 10 Jul 2023 21:20 )  Alb: 4.9 g/dL / Pro: 8.1 g/dL / ALK PHOS: 77 U/L / ALT: 202 U/L / AST: 128 U/L / GGT: x      PT/INR - ( 10 Jul 2023 21:17 )   PT: 12.3 sec;   INR: 1.07 ratio         PTT - ( 10 Jul 2023 21:17 )  PTT:30.7 sec  Urinalysis Basic - ( 10 Jul 2023 21:20 )    Color: x / Appearance: x / SG: x / pH: x  Gluc: 101 mg/dL / Ketone: x  / Bili: x / Urobili: x   Blood: x / Protein: x / Nitrite: x   Leuk Esterase: x / RBC: x / WBC x   Sq Epi: x / Non Sq Epi: x / Bacteria: x      ____________________________________________    RADIOLOGY & ADDITIONAL STUDIES: VASCULAR SURGERY CONSULT NOTE    HPI: 48M PMH PAD, CAD sp PTCA s/p R superficial femoral artery angioplasty, and stents placed p/t ED for persistent RLE edema.     In the ED, patient was afebrile, VSS WBC 8.28 U/S shows patent RLE stents, no DVTs with known L SFA occlusion.      PMH/PSH: HTN (hypertension)    PAD (peripheral artery disease)    S/P CABG (coronary artery bypass graft)     MEDS:    ALLERGIES:    REVIEW OF SYSTEMS: All ROS negative except as per HPI.  ____________________________________________    VITALS:T(C): 36.9, Max: 36.9 (07-10)  T(F): 98.5, Max: 98.5 (07-10)  HR: 75 (75 - 83)  BP: 147/88 (147/88 - 183/95)  BP(mean): 108 (108 - 108)  ABP: --  ABP(mean): --  RR: 17 (17 - 18)  SpO2: 99% (98% - 99%)  CVP(mm Hg): --  CVP(cm H2O): --  room air          PHYSICAL EXAM:  General: AAOx3, no acute distress.  Respiratory: breathing comfortably, no increased WOB   Abdomen: soft, nontender, nondistended, no rebound, no guarding  Extremities: Moves all four.   - RLE: Palpable DP pulse, PT signals  - LLE: DP and PT signals  ____________________________________________    LABS:                      14.5  8.28 )-----------( 293    ( 10 Jul 2023 21:20 )             43.3  142  |  103  |  22  ----------------------------<  101<H>    (07-10)  3.9   |  24  |  0.86          Ca    10.0      07-10  Mg    x  Phos  x        LIVER FUNCTIONS - ( 10 Jul 2023 21:20 )  Alb: 4.9 g/dL / Pro: 8.1 g/dL / ALK PHOS: 77 U/L / ALT: 202 U/L / AST: 128 U/L / GGT: x      PT/INR - ( 10 Jul 2023 21:17 )   PT: 12.3 sec;   INR: 1.07 ratio         PTT - ( 10 Jul 2023 21:17 )  PTT:30.7 sec  Urinalysis Basic - ( 10 Jul 2023 21:20 )    Color: x / Appearance: x / SG: x / pH: x  Gluc: 101 mg/dL / Ketone: x  / Bili: x / Urobili: x   Blood: x / Protein: x / Nitrite: x   Leuk Esterase: x / RBC: x / WBC x   Sq Epi: x / Non Sq Epi: x / Bacteria: x      ____________________________________________    RADIOLOGY & ADDITIONAL STUDIES:

## 2023-07-10 NOTE — ED PROVIDER NOTE - CARE PROVIDER_API CALL
Bannazadeh, Mohsen  Vascular Surgery  [address], [address] [address]  Phone: [phone]  Fax: [phone]  Established Patient  Follow Up Time: 1-3 Days

## 2023-07-10 NOTE — ED PROVIDER NOTE - PROGRESS NOTE DETAILS
Masoud Gross MD PGY-3  Vasc Surg consulted for ? SFA stent occlusion. Seen at bedside. Masoud Gross MD PGY-3  US addended, left occlusion old - not new. Vascular rec OP follow-up for same. No leukocytosis, fever/chills, hold off on ABx for now.     Pt noted to have transaminitis, discussed with pt. Recommended RUQ US to r/o biliary pathology though seems more hepatocellular in etiology. Not > 500, so can likeyl f/u as OP with PCP for repeat labs & reassessment.

## 2023-07-10 NOTE — ED PROVIDER NOTE - PHYSICAL EXAMINATION
GEN: Awake, AOx3, NAD.  HEENT: NCAT  ---EYES: no scleral icterus, EOMI, PERRLA  CARDIO: RRR. Normal S1/S2, no m/r/g. No JVD.  RESP: Normal respiratory effort  ABD: Soft, NTND.   : No CVAT.   MSK: No obvious deformity or ROM deficit. 1-2+ pitting edmea to RLE a/w erythema, warmth; no discharge   SKIN: Warm, dry.  NEURO: Moves all four extremities spontaneously  PSYCH: Appropriate mood & affect.

## 2023-07-10 NOTE — ED PROVIDER NOTE - CLINICAL SUMMARY MEDICAL DECISION MAKING FREE TEXT BOX
48M with significant vasculopathic hx p/w RLE swelling, erythema after recent SFA stents. QDoc labs & imaging reviewed. DDx broad but includes DVT, stent malfunction w/o compartment syndrome, cellulitis (though no access or wounds near the right foot or leg & pt also w/o SIRS).   - Labs  - VA Duplex 48M with significant vasculopathic hx p/w RLE swelling, erythema after recent SFA stents. QDoc labs & imaging reviewed. DDx broad but includes DVT, stent malfunction w/o compartment syndrome, cellulitis (though no access or wounds near the right foot or leg & pt also w/o SIRS).   - Labs  - VA Duplex    ERIC Alva MD: Agree with resident/ACP MDM, assessment and plan as above. Will consult vascular surgery. Labs and venous duplex ordered in triage by Qdoc. Pt will require vessel imaging, vascular consultation. Pt currently pain free, ambulatory, lower extremities appear well-perfused, warm, pink b/l. Will reassess

## 2023-07-10 NOTE — ED ADULT NURSE NOTE - OBJECTIVE STATEMENT
48 year old male coming in for swelling of the legs. PMH  of PAD, CAD. Patient had recent stent placement in right lower extremity on 6/20. Patient is coming in for swelling that he states began one week after the surgery. The right foot is swollen and red. Pt states painful but not more on palpation. Pulses strong with doppler. Left leg is asymptomatic but pt states he is due for more stent placements in the left leg. Pt states surgical site is CDI. No shortness of breath or difficulty breathing. No chest pain, pressure or palpitations. No abdominal pain, nausea or vomiting. No fever, chills, or body aches.

## 2023-07-10 NOTE — ED PROVIDER NOTE - PATIENT PORTAL LINK FT
You can access the FollowMyHealth Patient Portal offered by [facility] by registering at the following website: [email or web address]. By joining [employer]’s FollowMyHealth portal, you will also be able to view your health information using other applications (apps) compatible with our system.

## 2023-07-10 NOTE — ED PROVIDER NOTE - RAPID ASSESSMENT
Private Physician Sofia Caba,   48y m pmh PAD, CAD sp PTCA Pt had recent stents RLE two weeks ago. PT comes to ed c/o persistent swelling rle, since procedure. No fever, chills. no pain on ambulation. on Berlinta/asa. No cp/sob. PE WDWN male awake alert nad rle mod edema to foot w/o ttp  Doug Masterson MD, Facep    PT seen as Qdoc/triage, full evaluation to be performed once pt is transferred to main ED  Doug Masterson MD, Facep

## 2023-07-10 NOTE — ED PROVIDER NOTE - NSCAREINITIATED _GEN_ER
difficulty feeding self/left sided weakness, s/p CVA/difficulty chewing/difficulty swallowing Masoud Gross(Resident)

## 2023-07-10 NOTE — CONSULT NOTE ADULT - ASSESSMENT
48M PMH PAD, CAD sp PTCA s/p R superficial femoral artery angioplasty, and stents placed p/t ED for persistent RLE edema. In the ED, patient was afebrile, VSS WBC 8.28 U/S shows patent RLE stents, no DVTs with known L SFA occlusion.     PLAN  - No acute surgical intervention  - Patient to follow-up with Dr Plascencia in office  - Dispo home    Discussed with fellow on behalf of Dr Plascencia    Vascular Surgery  [phone]

## 2023-07-10 NOTE — ED PROVIDER NOTE - OBJECTIVE STATEMENT
48M with h/o PAD, CAD s/p SFA stents 2 weeks ago p/w RLE swelilng, erythema w/o fever/chills. Pt called his OP provider whose office recommended he come in for evaluation. Pt denies fever/chills/numbness/tingling.

## 2023-07-10 NOTE — ED PROVIDER NOTE - DIFFERENTIAL DIAGNOSIS
Ddx includes, however, is not limited to: cellulitis, stasis changes, arterial occlusion, dvt, other Differential Diagnosis

## 2023-07-10 NOTE — ED PROVIDER NOTE - NS ED ROS FT
GEN: No fever, chills, night sweats, weight loss  EYES: No vision changes, irritation, itchiness  ENT: No ear pain, congestion, sore throat  RESP: No cough or trouble breathing  CARDIOVASCULAR: No chest pain or palpitations  GI: No nausea/vomiting, diarrhea, constipation  :  No change in urine output; no dysuria, hematuria, or discharge  MSK: as per HPI  SKIN: as per HPI  NEURO: No headache; no abnormal movements; no numbness or tingling  Remainder negative, except as per the HPI

## 2023-07-10 NOTE — ED PROVIDER NOTE - NSFOLLOWUPINSTRUCTIONS_ED_ALL_ED_FT
You were seen in the Emergency Department for leg swelling after your recent procedure. We obtained an ultrasound to assess for possible clot or complication of your recent procedure. This did not show any problems; we also consulted Vascular Surgery who recommended that you follow-up as an outpatient with Dr. Plascencia with vascular surgery.     We did note that you have a mild elevation in your liver enzymes, which can occur for multiple reasons. You didn't have any pain in that area or evidence for an emergent liver problem, but we recommend that you don't take more than 500mg of Acetaminophen ("Tylenol") per dose, don't take over the counter herbal supplements, and follow-up with your Primary Care Provider to have your liver function testing repeated within 1 week.    You should return to the Emergency Department if you develop progression of redness, fever/chills, weakness, numbness or cold lower extremity, or any other new or concerning symptoms that you would like to have evaluated.

## 2023-07-11 ENCOUNTER — NON-APPOINTMENT (OUTPATIENT)
Age: 48
End: 2023-07-11

## 2023-07-11 VITALS
HEART RATE: 77 BPM | TEMPERATURE: 98 F | RESPIRATION RATE: 18 BRPM | OXYGEN SATURATION: 98 % | DIASTOLIC BLOOD PRESSURE: 96 MMHG | SYSTOLIC BLOOD PRESSURE: 153 MMHG

## 2023-07-19 ENCOUNTER — APPOINTMENT (OUTPATIENT)
Dept: CARDIOLOGY | Facility: CLINIC | Age: 48
End: 2023-07-19
Payer: SELF-PAY

## 2023-07-19 VITALS — HEART RATE: 87 BPM | SYSTOLIC BLOOD PRESSURE: 150 MMHG | OXYGEN SATURATION: 98 % | DIASTOLIC BLOOD PRESSURE: 87 MMHG

## 2023-07-19 PROCEDURE — 99215 OFFICE O/P EST HI 40 MIN: CPT

## 2023-07-19 RX ORDER — AMLODIPINE BESYLATE 10 MG/1
10 TABLET ORAL DAILY
Qty: 90 | Refills: 3 | Status: ACTIVE | COMMUNITY
Start: 2022-03-30 | End: 1900-01-01

## 2023-07-19 RX ORDER — ASPIRIN 81 MG/1
81 TABLET, CHEWABLE ORAL
Qty: 90 | Refills: 3 | Status: ACTIVE | COMMUNITY
Start: 2022-03-04 | End: 1900-01-01

## 2023-07-19 RX ORDER — METOPROLOL SUCCINATE 50 MG/1
50 TABLET, EXTENDED RELEASE ORAL
Qty: 90 | Refills: 3 | Status: ACTIVE | COMMUNITY
Start: 2022-03-30 | End: 1900-01-01

## 2023-07-19 RX ORDER — EZETIMIBE 10 MG/1
10 TABLET ORAL
Qty: 90 | Refills: 3 | Status: ACTIVE | COMMUNITY
Start: 2022-10-27 | End: 1900-01-01

## 2023-07-20 NOTE — REASON FOR VISIT
[Follow-Up - Clinic] : a clinic follow-up of [Peripheral Vascular Disease] : peripheral vascular disease [FreeTextEntry1] : 49 yo M with CAD s/p CABG x 2 (LIMA to LAD, L Rad to OM), preserved EF, PAD with known R and L SFA  with claudication, HTN who is referred for vascular medicine follow-up. \par \par s/p RSFA stent 7/10/23. Last week with leg swelling. Went to the ED?\par \par s/p RCIA stent. Unable to cross  from above. Planned for pedal approach tomorrow. Groin site healed. Bruising. Palpable pulse. \par \par Relevant PMHx: \par He has claudication after walking a little over a block. He has had a toe ulcer in the past that took many months to heal. He had a CTA and arterial duplex done previously. He has known  of the bilateral SFAs. He denies any CP, SOB or palpitations. He walks every day; has to stop for relief from pain but then keeps walking. No active wounds now.  He is on DAPT, high intensity statin and zetia. \par \par Non-invasive testing:\par 5/24/2022: RABI 0.49, LABI 0.56\par CTA: RSFA ostial moderate disease, distal RSFA  with reconstitution\par LSFA with ostial occlusion and reconstitution at the L Popliteal \par No BTK disease. \par

## 2023-07-20 NOTE — ASSESSMENT
[FreeTextEntry1] : PAD with lifestyle limiting claudication, prior R toe wound with delayed healing. Known CTOs bilateral SFAs. No BTK disease. s/p RSFA IVL, DCBA and selective stenting of the proximal and distal JEN.\par \par -exercise therapy; GDMT; RF modification\par -given persistent claudication despite walking and intensification of GDMT underwent diagnostic peripheral angiography, s/p RCIA and REIA stent, RSFA DESx3\par -plan for LSFA intervention in 2-3 weeks \par \par

## 2023-07-20 NOTE — PHYSICAL EXAM
[General Appearance - In No Acute Distress] : no acute distress [Normal Conjunctiva] : the conjunctiva exhibited no abnormalities [Normal Oral Mucosa] : normal oral mucosa [Auscultation Breath Sounds / Voice Sounds] : lungs were clear to auscultation bilaterally [Heart Sounds] : normal S1 and S2 [Murmurs] : no murmurs present [Edema] : no peripheral edema present [FreeTextEntry1] : Dopplerable PT/DP left, right 1+DP/PT [Abdomen Soft] : soft [Abdomen Tenderness] : non-tender [Abnormal Walk] : normal gait [Cyanosis, Localized] : no localized cyanosis [] : no rash [Skin Lesions] : no skin lesions [No Skin Ulcers] : no skin ulcer [Oriented To Time, Place, And Person] : oriented to person, place, and time [Impaired Insight] : insight and judgment were intact [Affect] : the affect was normal

## 2023-08-09 ENCOUNTER — APPOINTMENT (OUTPATIENT)
Dept: FAMILY MEDICINE | Facility: HOSPITAL | Age: 48
End: 2023-08-09

## 2023-08-09 ENCOUNTER — NON-APPOINTMENT (OUTPATIENT)
Age: 48
End: 2023-08-09

## 2023-08-09 ENCOUNTER — OUTPATIENT (OUTPATIENT)
Dept: OUTPATIENT SERVICES | Facility: HOSPITAL | Age: 48
LOS: 1 days | End: 2023-08-09
Payer: SELF-PAY

## 2023-08-09 VITALS
BODY MASS INDEX: 29.92 KG/M2 | TEMPERATURE: 98.5 F | HEART RATE: 80 BPM | RESPIRATION RATE: 16 BRPM | SYSTOLIC BLOOD PRESSURE: 146 MMHG | DIASTOLIC BLOOD PRESSURE: 87 MMHG | WEIGHT: 191 LBS | OXYGEN SATURATION: 98 %

## 2023-08-09 DIAGNOSIS — I73.9 PERIPHERAL VASCULAR DISEASE, UNSPECIFIED: ICD-10-CM

## 2023-08-09 DIAGNOSIS — Z95.1 PRESENCE OF AORTOCORONARY BYPASS GRAFT: ICD-10-CM

## 2023-08-09 DIAGNOSIS — Z00.00 ENCOUNTER FOR GENERAL ADULT MEDICAL EXAMINATION WITHOUT ABNORMAL FINDINGS: ICD-10-CM

## 2023-08-09 DIAGNOSIS — Z95.1 PRESENCE OF AORTOCORONARY BYPASS GRAFT: Chronic | ICD-10-CM

## 2023-08-09 DIAGNOSIS — I25.10 ATHEROSCLEROTIC HEART DISEASE OF NATIVE CORONARY ARTERY WITHOUT ANGINA PECTORIS: ICD-10-CM

## 2023-08-09 DIAGNOSIS — R74.01 ELEVATION OF LEVELS OF LIVER TRANSAMINASE LEVELS: ICD-10-CM

## 2023-08-09 DIAGNOSIS — I10 ESSENTIAL (PRIMARY) HYPERTENSION: ICD-10-CM

## 2023-08-09 PROCEDURE — 80053 COMPREHEN METABOLIC PANEL: CPT

## 2023-08-09 PROCEDURE — G0463: CPT

## 2023-08-09 RX ORDER — ATORVASTATIN CALCIUM 80 MG/1
80 TABLET, FILM COATED ORAL
Qty: 90 | Refills: 3 | Status: DISCONTINUED | COMMUNITY
Start: 2022-03-30 | End: 2023-08-09

## 2023-08-11 ENCOUNTER — NON-APPOINTMENT (OUTPATIENT)
Age: 48
End: 2023-08-11

## 2023-08-11 NOTE — HISTORY OF PRESENT ILLNESS
[FreeTextEntry1] : Hospital follow up [de-identified] : Patient is a 48y M with pmhx PAD s/p R superficial femoral artery angioplasty 6/20/2023 (3 stents total on right side. and left iliac artery stent (April 2023), CAD s/p CABGx2 and known Left superfiial femoral artery occlusion here for follow up after being in hospital [facility] ED on 7/10/23 for RLE edema due to known total occlusion of superficial femoral artery. Patient found to have transaminitis in ED. Abdominal US was done. Patient seen by cardiology 7/20/2023.  Patient to follow up with vascular surgery (Dr. Plascencia) for left femoral superficial artery intervention- has appointment 8/15.  Patient taking medications as prescribed.  Patient denies chest pain, SOB, abdominal pain, yellowing of skin or sclera, vomiting, diarrhea. No personal or family history of liver pathology. Patient does drink alcohol.  RLE edema present. worse during day, 50% reduced with elevation at night. Patient also has left sided pain with walking.

## 2023-08-11 NOTE — PHYSICAL EXAM
[No Acute Distress] : no acute distress [Well Nourished] : well nourished [Well Developed] : well developed [Well-Appearing] : well-appearing [Normal Sclera/Conjunctiva] : normal sclera/conjunctiva [PERRL] : pupils equal round and reactive to light [EOMI] : extraocular movements intact [No JVD] : no jugular venous distention [No Lymphadenopathy] : no lymphadenopathy [No Respiratory Distress] : no respiratory distress  [No Accessory Muscle Use] : no accessory muscle use [Clear to Auscultation] : lungs were clear to auscultation bilaterally [Normal Rate] : normal rate  [Regular Rhythm] : with a regular rhythm [Normal S1, S2] : normal S1 and S2 [No Carotid Bruits] : no carotid bruits [No Abdominal Bruit] : a ~M bruit was not heard ~T in the abdomen [No Palpable Aorta] : no palpable aorta [Soft] : abdomen soft [Non Tender] : non-tender [Non-distended] : non-distended [No HSM] : no HSM [No Rash] : no rash [de-identified] : systolic murmur of right sternal border.  [de-identified] : Left lower extremity 1+ edema. Weak R DP pulse and capillary refill 4 seconds. Left side Cap refill <3 sconds. Unable to palpate DP pulse.  [de-identified] : Negative mcdaniel sign. no fluid wave.

## 2023-08-11 NOTE — REVIEW OF SYSTEMS
[Claudication] : leg claudication [Lower Ext Edema] : lower extremity edema [Fever] : no fever [Chills] : no chills [Discharge] : no discharge [Earache] : no earache [Chest Pain] : no chest pain [Palpitations] : no palpitations [Orthopena] : no orthopnea [Shortness Of Breath] : no shortness of breath [Abdominal Pain] : no abdominal pain [Nausea] : no nausea [Constipation] : no constipation [Diarrhea] : no diarrhea [Vomiting] : no vomiting [Dysuria] : no dysuria [Incontinence] : no incontinence [Joint Pain] : no joint pain [Headache] : no headache

## 2023-08-11 NOTE — INTERPRETER SERVICES
[Patient Declined  Services] : - None: Patient declined  services [FreeTextEntry3] : Polish- MD shares pt language

## 2023-08-12 LAB
ALBUMIN SERPL ELPH-MCNC: 5.2 G/DL
ALP BLD-CCNC: 78 U/L
ALT SERPL-CCNC: 190 U/L
ANION GAP SERPL CALC-SCNC: 13 MMOL/L
AST SERPL-CCNC: 95 U/L
BILIRUB SERPL-MCNC: 0.3 MG/DL
BUN SERPL-MCNC: 22 MG/DL
CALCIUM SERPL-MCNC: 10.5 MG/DL
CHLORIDE SERPL-SCNC: 100 MMOL/L
CO2 SERPL-SCNC: 29 MMOL/L
CREAT SERPL-MCNC: 0.9 MG/DL
EGFR: 104 ML/MIN/1.73M2
GLUCOSE SERPL-MCNC: 111 MG/DL
POTASSIUM SERPL-SCNC: 4.2 MMOL/L
PROT SERPL-MCNC: 8.2 G/DL
SODIUM SERPL-SCNC: 142 MMOL/L

## 2023-08-14 ENCOUNTER — TRANSCRIPTION ENCOUNTER (OUTPATIENT)
Age: 48
End: 2023-08-14

## 2023-08-15 ENCOUNTER — TRANSCRIPTION ENCOUNTER (OUTPATIENT)
Age: 48
End: 2023-08-15

## 2023-08-15 ENCOUNTER — OUTPATIENT (OUTPATIENT)
Dept: OUTPATIENT SERVICES | Facility: HOSPITAL | Age: 48
LOS: 1 days | End: 2023-08-15
Payer: SELF-PAY

## 2023-08-15 VITALS
RESPIRATION RATE: 16 BRPM | HEART RATE: 77 BPM | TEMPERATURE: 98 F | SYSTOLIC BLOOD PRESSURE: 162 MMHG | OXYGEN SATURATION: 100 % | HEIGHT: 66 IN | WEIGHT: 160.06 LBS | DIASTOLIC BLOOD PRESSURE: 84 MMHG

## 2023-08-15 VITALS
SYSTOLIC BLOOD PRESSURE: 140 MMHG | TEMPERATURE: 98 F | RESPIRATION RATE: 17 BRPM | OXYGEN SATURATION: 99 % | DIASTOLIC BLOOD PRESSURE: 99 MMHG | HEART RATE: 72 BPM

## 2023-08-15 DIAGNOSIS — I73.9 PERIPHERAL VASCULAR DISEASE, UNSPECIFIED: ICD-10-CM

## 2023-08-15 DIAGNOSIS — Z95.1 PRESENCE OF AORTOCORONARY BYPASS GRAFT: Chronic | ICD-10-CM

## 2023-08-15 LAB
ANION GAP SERPL CALC-SCNC: 13 MMOL/L — SIGNIFICANT CHANGE UP (ref 5–17)
BUN SERPL-MCNC: 19 MG/DL — SIGNIFICANT CHANGE UP (ref 7–23)
CALCIUM SERPL-MCNC: 9.4 MG/DL — SIGNIFICANT CHANGE UP (ref 8.4–10.5)
CHLORIDE SERPL-SCNC: 103 MMOL/L — SIGNIFICANT CHANGE UP (ref 96–108)
CO2 SERPL-SCNC: 26 MMOL/L — SIGNIFICANT CHANGE UP (ref 22–31)
CREAT SERPL-MCNC: 0.83 MG/DL — SIGNIFICANT CHANGE UP (ref 0.5–1.3)
EGFR: 107 ML/MIN/1.73M2 — SIGNIFICANT CHANGE UP
GLUCOSE SERPL-MCNC: 122 MG/DL — HIGH (ref 70–99)
HCT VFR BLD CALC: 45.1 % — SIGNIFICANT CHANGE UP (ref 39–50)
HGB BLD-MCNC: 15.2 G/DL — SIGNIFICANT CHANGE UP (ref 13–17)
MCHC RBC-ENTMCNC: 30.7 PG — SIGNIFICANT CHANGE UP (ref 27–34)
MCHC RBC-ENTMCNC: 33.7 GM/DL — SIGNIFICANT CHANGE UP (ref 32–36)
MCV RBC AUTO: 91.1 FL — SIGNIFICANT CHANGE UP (ref 80–100)
NRBC # BLD: 0 /100 WBCS — SIGNIFICANT CHANGE UP (ref 0–0)
PLATELET # BLD AUTO: 239 K/UL — SIGNIFICANT CHANGE UP (ref 150–400)
POTASSIUM SERPL-MCNC: 3.9 MMOL/L — SIGNIFICANT CHANGE UP (ref 3.5–5.3)
POTASSIUM SERPL-SCNC: 3.9 MMOL/L — SIGNIFICANT CHANGE UP (ref 3.5–5.3)
RBC # BLD: 4.95 M/UL — SIGNIFICANT CHANGE UP (ref 4.2–5.8)
RBC # FLD: 13.2 % — SIGNIFICANT CHANGE UP (ref 10.3–14.5)
SODIUM SERPL-SCNC: 142 MMOL/L — SIGNIFICANT CHANGE UP (ref 135–145)
WBC # BLD: 8.24 K/UL — SIGNIFICANT CHANGE UP (ref 3.8–10.5)
WBC # FLD AUTO: 8.24 K/UL — SIGNIFICANT CHANGE UP (ref 3.8–10.5)

## 2023-08-15 PROCEDURE — 93010 ELECTROCARDIOGRAM REPORT: CPT

## 2023-08-15 PROCEDURE — C1760: CPT

## 2023-08-15 PROCEDURE — 37226: CPT

## 2023-08-15 PROCEDURE — 75710 ARTERY X-RAYS ARM/LEG: CPT | Mod: 26,59

## 2023-08-15 PROCEDURE — 76937 US GUIDE VASCULAR ACCESS: CPT | Mod: 26

## 2023-08-15 PROCEDURE — C2623: CPT

## 2023-08-15 PROCEDURE — C1887: CPT

## 2023-08-15 PROCEDURE — 85027 COMPLETE CBC AUTOMATED: CPT

## 2023-08-15 PROCEDURE — 93005 ELECTROCARDIOGRAM TRACING: CPT

## 2023-08-15 PROCEDURE — 80048 BASIC METABOLIC PNL TOTAL CA: CPT

## 2023-08-15 PROCEDURE — C1725: CPT

## 2023-08-15 PROCEDURE — C1874: CPT

## 2023-08-15 PROCEDURE — 99152 MOD SED SAME PHYS/QHP 5/>YRS: CPT

## 2023-08-15 PROCEDURE — C1894: CPT

## 2023-08-15 PROCEDURE — C1769: CPT

## 2023-08-15 RX ORDER — SODIUM CHLORIDE 9 MG/ML
1000 INJECTION INTRAMUSCULAR; INTRAVENOUS; SUBCUTANEOUS
Refills: 0 | Status: DISCONTINUED | OUTPATIENT
Start: 2023-08-15 | End: 2023-08-29

## 2023-08-15 RX ORDER — SODIUM CHLORIDE 9 MG/ML
250 INJECTION INTRAMUSCULAR; INTRAVENOUS; SUBCUTANEOUS ONCE
Refills: 0 | Status: COMPLETED | OUTPATIENT
Start: 2023-08-15 | End: 2023-08-15

## 2023-08-15 RX ORDER — AMLODIPINE BESYLATE 2.5 MG/1
10 TABLET ORAL DAILY
Refills: 0 | Status: DISCONTINUED | OUTPATIENT
Start: 2023-08-15 | End: 2023-08-29

## 2023-08-15 RX ORDER — ACETAMINOPHEN 500 MG
1000 TABLET ORAL ONCE
Refills: 0 | Status: COMPLETED | OUTPATIENT
Start: 2023-08-15 | End: 2023-08-15

## 2023-08-15 RX ORDER — METOPROLOL TARTRATE 50 MG
50 TABLET ORAL DAILY
Refills: 0 | Status: DISCONTINUED | OUTPATIENT
Start: 2023-08-15 | End: 2023-08-29

## 2023-08-15 RX ADMIN — SODIUM CHLORIDE 75 MILLILITER(S): 9 INJECTION INTRAMUSCULAR; INTRAVENOUS; SUBCUTANEOUS at 07:31

## 2023-08-15 RX ADMIN — SODIUM CHLORIDE 140 MILLILITER(S): 9 INJECTION INTRAMUSCULAR; INTRAVENOUS; SUBCUTANEOUS at 11:42

## 2023-08-15 RX ADMIN — Medication 400 MILLIGRAM(S): at 12:06

## 2023-08-15 RX ADMIN — Medication 1000 MILLIGRAM(S): at 12:56

## 2023-08-15 RX ADMIN — SODIUM CHLORIDE 750 MILLILITER(S): 9 INJECTION INTRAMUSCULAR; INTRAVENOUS; SUBCUTANEOUS at 07:30

## 2023-08-15 NOTE — ASU PATIENT PROFILE, ADULT - AS SC BRADEN NUTRITION
(4) excellent TCB to verify if result was discussed at [address] [place] [address] today 11/1/17    Please transfer [id number] anytime. Thank you.

## 2023-08-15 NOTE — H&P CARDIOLOGY - HISTORY OF PRESENT ILLNESS
49 y/o Male with CAD s/p CABG x 2 (LIMA to LAD, L Rad to OM in 2021),  preserved EF, HTN, PAD with known R and L SFA  with claudication and s/p JEN of right SFA in June 2023, presents for planned angiogram of left leg. Patient has claudication after walking a little over a block. He has had a toe ulcer in the past that took many months to heal. He denies any CP, SOB or palpitations. He walks every day, has intermittent claudication him from walking . No active wounds now.   Peripheral angiogram from 4/27/2023:   TASC D RSFA , unsuccessful attempt at revascularization. Crossed  distal cap but unable to cross proximal cap from pedal  access.    Peripheral angiogram from June 20, 2023:  Interventional Details   6-45 destication   SFA  crossed with .035 floppy Glide and CXI   predilated with 4 mm PTA   Shokewave angioplasty  with 6-60   DCB with 6 mm medtronic    selective sent 6-120 proximal SFA  JEN   selctive stent 6-80 distal SFA JEN overlapped with 6-40   Distal right Superficial Femoral: The initial stenosis was 99 %.  Guidewire crossing was successful.

## 2023-08-15 NOTE — H&P CARDIOLOGY - NSICDXFAMILYHX_GEN_ALL_CORE_FT
42 yo M with PMH of asthma and essential HTN presenting with sepsis 2/2 cellulitis of the upper lip and was found to have MRSA bacteremia, likely from lip abcess with surrounding cellulitis, now s/p I&D. 42 yo M with PMH of asthma and essential HTN presenting with sepsis 2/2 cellulitis of the upper lip and was found to have MRSA bacteremia, likely from lip abcess with surrounding cellulitis, now s/p I&D, improving. FAMILY HISTORY:  Father  Still living? Unknown  FH: CAD (coronary artery disease), Age at diagnosis: Age Unknown    Mother  Still living? Unknown  FH: CAD (coronary artery disease), Age at diagnosis: Age Unknown

## 2023-08-15 NOTE — ASU PATIENT PROFILE, ADULT - FALL HARM RISK - UNIVERSAL INTERVENTIONS
Bed in lowest position, wheels locked, appropriate side rails in place/Call bell, personal items and telephone in reach/Instruct patient to call for assistance before getting out of bed or chair/Non-slip footwear when patient is out of bed/[place] to call system/Physically safe environment - no spills, clutter or unnecessary equipment/Purposeful Proactive Rounding/Room/bathroom lighting operational, light cord in reach

## 2023-08-15 NOTE — ASU DISCHARGE PLAN (ADULT/PEDIATRIC) - NS MD DC FALL RISK RISK
For information on Fall & Injury Prevention, visit: [email or web address] OR  [email or web address] OR  https://www.cdc.gov/steadi/patient.html

## 2023-08-25 ENCOUNTER — APPOINTMENT (OUTPATIENT)
Dept: FAMILY MEDICINE | Facility: HOSPITAL | Age: 48
End: 2023-08-25

## 2023-08-25 ENCOUNTER — OUTPATIENT (OUTPATIENT)
Dept: OUTPATIENT SERVICES | Facility: HOSPITAL | Age: 48
LOS: 1 days | End: 2023-08-25
Payer: SELF-PAY

## 2023-08-25 ENCOUNTER — OUTPATIENT (OUTPATIENT)
Dept: OUTPATIENT SERVICES | Facility: HOSPITAL | Age: 48
LOS: 1 days | End: 2023-08-25
Payer: COMMERCIAL

## 2023-08-25 VITALS
BODY MASS INDEX: 28.98 KG/M2 | SYSTOLIC BLOOD PRESSURE: 140 MMHG | HEART RATE: 70 BPM | WEIGHT: 185 LBS | RESPIRATION RATE: 14 BRPM | TEMPERATURE: 98.6 F | OXYGEN SATURATION: 98 % | DIASTOLIC BLOOD PRESSURE: 83 MMHG

## 2023-08-25 DIAGNOSIS — Z29.9 ENCOUNTER FOR PROPHYLACTIC MEASURES, UNSPECIFIED: ICD-10-CM

## 2023-08-25 DIAGNOSIS — Z12.11 ENCOUNTER FOR SCREENING FOR MALIGNANT NEOPLASM OF COLON: ICD-10-CM

## 2023-08-25 DIAGNOSIS — Z00.00 ENCOUNTER FOR GENERAL ADULT MEDICAL EXAMINATION WITHOUT ABNORMAL FINDINGS: ICD-10-CM

## 2023-08-25 DIAGNOSIS — Z23 ENCOUNTER FOR IMMUNIZATION: ICD-10-CM

## 2023-08-25 DIAGNOSIS — R74.01 ELEVATION OF LEVELS OF LIVER TRANSAMINASE LEVELS: ICD-10-CM

## 2023-08-25 DIAGNOSIS — Z95.1 PRESENCE OF AORTOCORONARY BYPASS GRAFT: Chronic | ICD-10-CM

## 2023-08-25 PROCEDURE — 36415 COLL VENOUS BLD VENIPUNCTURE: CPT

## 2023-08-25 PROCEDURE — G0463: CPT

## 2023-08-25 PROCEDURE — 82274 ASSAY TEST FOR BLOOD FECAL: CPT

## 2023-08-25 PROCEDURE — 87340 HEPATITIS B SURFACE AG IA: CPT

## 2023-08-25 PROCEDURE — 86706 HEP B SURFACE ANTIBODY: CPT

## 2023-08-25 PROCEDURE — 86803 HEPATITIS C AB TEST: CPT

## 2023-08-25 PROCEDURE — 86704 HEP B CORE ANTIBODY TOTAL: CPT

## 2023-08-25 PROCEDURE — 80053 COMPREHEN METABOLIC PANEL: CPT

## 2023-08-25 PROCEDURE — 82728 ASSAY OF FERRITIN: CPT

## 2023-08-26 LAB
ALBUMIN SERPL ELPH-MCNC: 4.9 G/DL
ALP BLD-CCNC: 84 U/L
ALT SERPL-CCNC: 166 U/L
ANION GAP SERPL CALC-SCNC: 15 MMOL/L
AST SERPL-CCNC: 68 U/L
BILIRUB SERPL-MCNC: 0.3 MG/DL
BUN SERPL-MCNC: 21 MG/DL
CALCIUM SERPL-MCNC: 9.8 MG/DL
CHLORIDE SERPL-SCNC: 102 MMOL/L
CO2 SERPL-SCNC: 24 MMOL/L
CREAT SERPL-MCNC: 1 MG/DL
EGFR: 92 ML/MIN/1.73M2
FERRITIN SERPL-MCNC: 258 NG/ML
GLUCOSE SERPL-MCNC: 88 MG/DL
HBV CORE IGG+IGM SER QL: NONREACTIVE
HBV SURFACE AB SER QL: NONREACTIVE
HBV SURFACE AG SER QL: NONREACTIVE
HCV AB SER QL: NONREACTIVE
HCV S/CO RATIO: 0.12 S/CO
POTASSIUM SERPL-SCNC: 4 MMOL/L
PROT SERPL-MCNC: 7.9 G/DL
SODIUM SERPL-SCNC: 141 MMOL/L

## 2023-08-29 NOTE — REVIEW OF SYSTEMS
[Claudication] : leg claudication [Lower Ext Edema] : lower extremity edema [Fever] : no fever [Chills] : no chills [Vision Problems] : no vision problems [Hearing Loss] : no hearing loss [Chest Pain] : no chest pain [Palpitations] : no palpitations [Shortness Of Breath] : no shortness of breath [Cough] : no cough [Dyspnea on Exertion] : not dyspnea on exertion [Abdominal Pain] : no abdominal pain [Nausea] : no nausea [Constipation] : no constipation [Diarrhea] : no diarrhea [Vomiting] : no vomiting [Incontinence] : no incontinence [Hematuria] : no hematuria [Joint Pain] : no joint pain [Muscle Weakness] : no muscle weakness [Back Pain] : no back pain [Skin Rash] : no skin rash [Headache] : no headache [Dizziness] : no dizziness [FreeTextEntry5] : See hpi

## 2023-08-29 NOTE — HISTORY OF PRESENT ILLNESS
[FreeTextEntry8] : CSP- COLON CANCER SCREENING [FreeTextEntry1] : Elevated LFTs/ post surgery [de-identified] : Patient is a 48y M with pmhx PAD s/p R superficial femoral artery angioplasty 6/20/2023 (3 stents total on right side. and left iliac artery stent (April 2023),  CAD s/p CABGx2 and known Left superficial femoral artery occlusion here for follow up after being in hospital [facility] ED on 7/10/23 for RLE edema due to known total occlusion of superficial femoral artery.  At ER visit 7/10, pt was found to have transaminitis. Abdominal US was NOT done. Repeat LFTs in office 8/9  still elevated.   Patient is doing well after left superficial femoral artery angioplasty with stent on 8/15. Much less claudication than before surgery. Patient was taking Tylenol 1000q 4 hrs after surgery. Has not taken in a few days.  No chest pain, SOB, palpitations, no worsening of leg edema or pain.   No personal of fhx of liver pathology, exposure to hepatitis virus, recent travel to area with endemic hepatitis b or c.

## 2023-08-29 NOTE — INTERPRETER SERVICES
[Patient Declined  Services] : - None: Patient declined  services [FreeTextEntry3] : Patient shares language with MD [TWNoteComboBox1] : Polish

## 2023-08-29 NOTE — PHYSICAL EXAM
[Normal Sclera/Conjunctiva] : normal sclera/conjunctiva [PERRL] : pupils equal round and reactive to light [No JVD] : no jugular venous distention [No Lymphadenopathy] : no lymphadenopathy [Supple] : supple [Normal] : normal rate, regular rhythm, normal S1 and S2 and no murmur heard [No Abdominal Bruit] : a ~M bruit was not heard ~T in the abdomen [Normal Bowel Sounds] : normal bowel sounds [Normal Anterior Cervical Nodes] : no anterior cervical lymphadenopathy [No Spinal Tenderness] : no spinal tenderness [No Joint Swelling] : no joint swelling [Normal Affect] : the affect was normal [Normal Insight/Judgement] : insight and judgment were intact [Soft] : abdomen soft [Non Tender] : non-tender [Non-distended] : non-distended [No Masses] : no abdominal mass palpated [No HSM] : no HSM [de-identified] : 1+ LE edema b/l

## 2023-08-29 NOTE — HISTORY OF PRESENT ILLNESS
[FreeTextEntry8] : CSP- COLON CANCER SCREENING [FreeTextEntry1] : Elevated LFTs/ post surgery [de-identified] : Patient is a 48y M with pmhx PAD s/p R superficial femoral artery angioplasty 6/20/2023 (3 stents total on right side. and left iliac artery stent (April 2023),  CAD s/p CABGx2 and known Left superficial femoral artery occlusion here for follow up after being in hospital [facility] ED on 7/10/23 for RLE edema due to known total occlusion of superficial femoral artery.  At ER visit 7/10, pt was found to have transaminitis. Abdominal US was NOT done. Repeat LFTs in office 8/9  still elevated.   Patient is doing well after left superficial femoral artery angioplasty with stent on 8/15. Much less claudication than before surgery. Patient was taking Tylenol 1000q 4 hrs after surgery. Has not taken in a few days.  No chest pain, SOB, palpitations, no worsening of leg edema or pain.   No personal of fhx of liver pathology, exposure to hepatitis virus, recent travel to area with endemic hepatitis b or c.

## 2023-08-29 NOTE — PHYSICAL EXAM
[Normal Sclera/Conjunctiva] : normal sclera/conjunctiva [PERRL] : pupils equal round and reactive to light [No JVD] : no jugular venous distention [No Lymphadenopathy] : no lymphadenopathy [Supple] : supple [Normal] : normal rate, regular rhythm, normal S1 and S2 and no murmur heard [No Abdominal Bruit] : a ~M bruit was not heard ~T in the abdomen [Normal Bowel Sounds] : normal bowel sounds [Normal Anterior Cervical Nodes] : no anterior cervical lymphadenopathy [No Spinal Tenderness] : no spinal tenderness [No Joint Swelling] : no joint swelling [Normal Affect] : the affect was normal [Normal Insight/Judgement] : insight and judgment were intact [Soft] : abdomen soft [Non Tender] : non-tender [Non-distended] : non-distended [No Masses] : no abdominal mass palpated [No HSM] : no HSM [de-identified] : 1+ LE edema b/l

## 2023-08-30 ENCOUNTER — APPOINTMENT (OUTPATIENT)
Dept: CARDIOLOGY | Facility: CLINIC | Age: 48
End: 2023-08-30
Payer: COMMERCIAL

## 2023-08-30 VITALS
DIASTOLIC BLOOD PRESSURE: 67 MMHG | BODY MASS INDEX: 29.03 KG/M2 | SYSTOLIC BLOOD PRESSURE: 137 MMHG | HEIGHT: 67 IN | WEIGHT: 185 LBS | HEART RATE: 78 BPM | OXYGEN SATURATION: 97 %

## 2023-08-30 VITALS
HEIGHT: 67 IN | BODY MASS INDEX: 29.03 KG/M2 | OXYGEN SATURATION: 97 % | WEIGHT: 185 LBS | HEART RATE: 79 BPM | SYSTOLIC BLOOD PRESSURE: 137 MMHG | DIASTOLIC BLOOD PRESSURE: 76 MMHG

## 2023-08-30 PROCEDURE — 99215 OFFICE O/P EST HI 40 MIN: CPT

## 2023-08-30 NOTE — REASON FOR VISIT
[Follow-Up - Clinic] : a clinic follow-up of [Coronary Artery Disease] : coronary artery disease [Peripheral Vascular Disease] : peripheral vascular disease [Prior Myocardial Infarction] : a prior myocardial infarction [FreeTextEntry1] : 47 yo M with CAD s/p CABG x 2 (LIMA to LAD, L Rad to OM), preserved EF, PAD with known R and L SFA  with claudication s/l bilateral SFA intervention, HTN who returns for vascular medicine follow-up.   s/p LSFA stent 8/15/23. He is walking without claudication. Very happy. Primary care is checking his LFTs; they have been mildly elevated, going for liver ultrasound this week. No CP, SOB or palpitations. No wounds.   Relevant PMHx:  He has claudication after walking a little over a block. He has had a toe ulcer in the past that took many months to heal. He had a CTA and arterial duplex done previously. He has known  of the bilateral SFAs. He denies any CP, SOB or palpitations. He walks every day; has to stop for relief from pain but then keeps walking. No active wounds now.  He is on DAPT, high intensity statin and zetia. s/p bilateral SFA stenting of the CTOs.  Non-invasive testin2022: RABI 0.49, LABI 0.56 CTA: RSFA ostial moderate disease, distal RSFA  with reconstitution LSFA with ostial occlusion and reconstitution at the L Popliteal  No BTK disease.

## 2023-08-30 NOTE — ASSESSMENT
[FreeTextEntry1] : PAD with lifestyle limiting claudication, prior R toe wound with delayed healing. Known CTOs bilateral SFAs. No BTK disease. s/p RSFA IVL, DCBA and selective stenting of the proximal and distal SFA JEN x2 and LSFA IVL, DCBA and selective stenting of the proximal and distal SFA JEN x2.  -exercise therapy; GDMT; RF modification -LFT follow-up and liver ultrasound; needs repeat lipid panel; statin can be switched out if needed; avoid NSAIDS -f/u 3 months  -can stop Brilinta next visit given recent peripheral JEN

## 2023-08-30 NOTE — PHYSICAL EXAM
[General Appearance - In No Acute Distress] : no acute distress [Normal Conjunctiva] : the conjunctiva exhibited no abnormalities [Normal Oral Mucosa] : normal oral mucosa [Auscultation Breath Sounds / Voice Sounds] : lungs were clear to auscultation bilaterally [Heart Sounds] : normal S1 and S2 [Murmurs] : no murmurs present [Edema] : no peripheral edema present [FreeTextEntry1] : 1+ palpable  PT/DP bilateral [Abdomen Soft] : soft [Abdomen Tenderness] : non-tender [Abnormal Walk] : normal gait [Cyanosis, Localized] : no localized cyanosis [] : no rash [Skin Lesions] : no skin lesions [No Skin Ulcers] : no skin ulcer [Oriented To Time, Place, And Person] : oriented to person, place, and time [Impaired Insight] : insight and judgment were intact [Affect] : the affect was normal

## 2023-08-31 NOTE — H&P CARDIOLOGY - WEIGHT IN LBS
160 Niacinamide Counseling: I recommended taking niacin or niacinamide, also know as vitamin B3, twice daily. Recent evidence suggests that taking vitamin B3 (500 mg twice daily) can reduce the risk of actinic keratoses and non-melanoma skin cancers. Side effects of vitamin B3 include flushing and headache.

## 2023-09-01 ENCOUNTER — NON-APPOINTMENT (OUTPATIENT)
Age: 48
End: 2023-09-01

## 2023-09-01 ENCOUNTER — RESULT REVIEW (OUTPATIENT)
Age: 48
End: 2023-09-01

## 2023-09-01 ENCOUNTER — OUTPATIENT (OUTPATIENT)
Dept: OUTPATIENT SERVICES | Facility: HOSPITAL | Age: 48
LOS: 1 days | End: 2023-09-01
Payer: COMMERCIAL

## 2023-09-01 DIAGNOSIS — R74.01 ELEVATION OF LEVELS OF LIVER TRANSAMINASE LEVELS: ICD-10-CM

## 2023-09-01 DIAGNOSIS — Z95.1 PRESENCE OF AORTOCORONARY BYPASS GRAFT: Chronic | ICD-10-CM

## 2023-09-01 DIAGNOSIS — Z00.00 ENCOUNTER FOR GENERAL ADULT MEDICAL EXAMINATION WITHOUT ABNORMAL FINDINGS: ICD-10-CM

## 2023-09-01 PROCEDURE — 76705 ECHO EXAM OF ABDOMEN: CPT

## 2023-09-01 PROCEDURE — 76705 ECHO EXAM OF ABDOMEN: CPT | Mod: 26

## 2023-09-12 ENCOUNTER — NON-APPOINTMENT (OUTPATIENT)
Age: 48
End: 2023-09-12

## 2023-09-18 ENCOUNTER — NON-APPOINTMENT (OUTPATIENT)
Age: 48
End: 2023-09-18

## 2023-09-21 LAB — HEMOCCULT STL QL IA: NEGATIVE

## 2023-10-16 ENCOUNTER — RESULT REVIEW (OUTPATIENT)
Age: 48
End: 2023-10-16

## 2023-10-16 ENCOUNTER — APPOINTMENT (OUTPATIENT)
Dept: FAMILY MEDICINE | Facility: HOSPITAL | Age: 48
End: 2023-10-16

## 2023-10-16 ENCOUNTER — OUTPATIENT (OUTPATIENT)
Dept: OUTPATIENT SERVICES | Facility: HOSPITAL | Age: 48
LOS: 1 days | End: 2023-10-16
Payer: SELF-PAY

## 2023-10-16 VITALS
DIASTOLIC BLOOD PRESSURE: 99 MMHG | HEART RATE: 79 BPM | RESPIRATION RATE: 14 BRPM | BODY MASS INDEX: 29.76 KG/M2 | WEIGHT: 190 LBS | SYSTOLIC BLOOD PRESSURE: 156 MMHG | OXYGEN SATURATION: 99 % | TEMPERATURE: 98.5 F

## 2023-10-16 DIAGNOSIS — Z87.891 PERSONAL HISTORY OF NICOTINE DEPENDENCE: ICD-10-CM

## 2023-10-16 DIAGNOSIS — Z00.00 ENCOUNTER FOR GENERAL ADULT MEDICAL EXAMINATION WITHOUT ABNORMAL FINDINGS: ICD-10-CM

## 2023-10-16 DIAGNOSIS — R74.01 ELEVATION OF LEVELS OF LIVER TRANSAMINASE LEVELS: ICD-10-CM

## 2023-10-16 DIAGNOSIS — Z95.1 PRESENCE OF AORTOCORONARY BYPASS GRAFT: Chronic | ICD-10-CM

## 2023-10-16 PROCEDURE — G0463: CPT

## 2023-10-17 ENCOUNTER — OUTPATIENT (OUTPATIENT)
Dept: OUTPATIENT SERVICES | Facility: HOSPITAL | Age: 48
LOS: 1 days | End: 2023-10-17
Payer: COMMERCIAL

## 2023-10-17 ENCOUNTER — APPOINTMENT (OUTPATIENT)
Dept: CT IMAGING | Facility: HOSPITAL | Age: 48
End: 2023-10-17
Payer: COMMERCIAL

## 2023-10-17 DIAGNOSIS — Z87.891 PERSONAL HISTORY OF NICOTINE DEPENDENCE: ICD-10-CM

## 2023-10-17 DIAGNOSIS — Z95.1 PRESENCE OF AORTOCORONARY BYPASS GRAFT: Chronic | ICD-10-CM

## 2023-10-17 PROCEDURE — 36415 COLL VENOUS BLD VENIPUNCTURE: CPT

## 2023-10-17 PROCEDURE — 80061 LIPID PANEL: CPT

## 2023-10-17 PROCEDURE — 71250 CT THORAX DX C-: CPT | Mod: 26

## 2023-10-17 PROCEDURE — 71250 CT THORAX DX C-: CPT

## 2023-10-24 ENCOUNTER — NON-APPOINTMENT (OUTPATIENT)
Age: 48
End: 2023-10-24

## 2023-10-25 ENCOUNTER — NON-APPOINTMENT (OUTPATIENT)
Age: 48
End: 2023-10-25

## 2023-10-26 LAB
CHOLEST SERPL-MCNC: 249 MG/DL
HDLC SERPL-MCNC: 53 MG/DL
LDLC SERPL CALC-MCNC: 160 MG/DL
NONHDLC SERPL-MCNC: 197 MG/DL
TRIGL SERPL-MCNC: 200 MG/DL

## 2023-11-08 ENCOUNTER — APPOINTMENT (OUTPATIENT)
Dept: CARDIOLOGY | Facility: CLINIC | Age: 48
End: 2023-11-08
Payer: COMMERCIAL

## 2023-11-08 VITALS
DIASTOLIC BLOOD PRESSURE: 83 MMHG | BODY MASS INDEX: 29.82 KG/M2 | HEIGHT: 67 IN | SYSTOLIC BLOOD PRESSURE: 149 MMHG | HEART RATE: 79 BPM | OXYGEN SATURATION: 99 % | WEIGHT: 190 LBS

## 2023-11-08 PROCEDURE — 99215 OFFICE O/P EST HI 40 MIN: CPT

## 2024-01-03 ENCOUNTER — LABORATORY RESULT (OUTPATIENT)
Age: 49
End: 2024-01-03

## 2024-01-03 ENCOUNTER — APPOINTMENT (OUTPATIENT)
Dept: CARDIOLOGY | Facility: CLINIC | Age: 49
End: 2024-01-03
Payer: COMMERCIAL

## 2024-01-03 ENCOUNTER — NON-APPOINTMENT (OUTPATIENT)
Age: 49
End: 2024-01-03

## 2024-01-03 VITALS
HEIGHT: 67 IN | HEART RATE: 79 BPM | OXYGEN SATURATION: 97 % | DIASTOLIC BLOOD PRESSURE: 88 MMHG | BODY MASS INDEX: 30.92 KG/M2 | SYSTOLIC BLOOD PRESSURE: 160 MMHG | TEMPERATURE: 98.2 F | WEIGHT: 197 LBS

## 2024-01-03 VITALS
BODY MASS INDEX: 30.92 KG/M2 | HEIGHT: 67 IN | TEMPERATURE: 98.2 F | RESPIRATION RATE: 16 BRPM | OXYGEN SATURATION: 97 % | HEART RATE: 79 BPM | WEIGHT: 197 LBS

## 2024-01-03 DIAGNOSIS — Z82.3 FAMILY HISTORY OF STROKE: ICD-10-CM

## 2024-01-03 DIAGNOSIS — Z83.42 FAMILY HISTORY OF FAMILIAL HYPERCHOLESTEROLEMIA: ICD-10-CM

## 2024-01-03 DIAGNOSIS — Z87.891 PERSONAL HISTORY OF NICOTINE DEPENDENCE: ICD-10-CM

## 2024-01-03 DIAGNOSIS — R94.39 ABNORMAL RESULT OF OTHER CARDIOVASCULAR FUNCTION STUDY: ICD-10-CM

## 2024-01-03 DIAGNOSIS — Z95.1 PRESENCE OF AORTOCORONARY BYPASS GRAFT: ICD-10-CM

## 2024-01-03 DIAGNOSIS — R01.1 CARDIAC MURMUR, UNSPECIFIED: ICD-10-CM

## 2024-01-03 DIAGNOSIS — Z86.79 PERSONAL HISTORY OF OTHER DISEASES OF THE CIRCULATORY SYSTEM: ICD-10-CM

## 2024-01-03 DIAGNOSIS — Z78.9 OTHER SPECIFIED HEALTH STATUS: ICD-10-CM

## 2024-01-03 DIAGNOSIS — Z84.89 FAMILY HISTORY OF OTHER SPECIFIED CONDITIONS: ICD-10-CM

## 2024-01-03 DIAGNOSIS — Z82.49 FAMILY HISTORY OF ISCHEMIC HEART DISEASE AND OTHER DISEASES OF THE CIRCULATORY SYSTEM: ICD-10-CM

## 2024-01-03 PROCEDURE — 93000 ELECTROCARDIOGRAM COMPLETE: CPT

## 2024-01-03 PROCEDURE — 99204 OFFICE O/P NEW MOD 45 MIN: CPT

## 2024-01-03 NOTE — REASON FOR VISIT
[Hyperlipidemia] : hyperlipidemia [CV Risk Factors and Non-Cardiac Disease] : CV risk factors and non-cardiac disease [Coronary Artery Disease] : coronary artery disease [Carotid, Aortic and Peripheral Vascular Disease] : carotid, aortic and peripheral vascular disease [FreeTextEntry3] : Dr Sofia Caba [FreeTextEntry1] : This is a 48-year-old male with a past medical history of hypertension, coronary artery disease, s/p CABG x 2, PAD is here for lipid consultation. Patient was sent in by his Cardiologist was LDL-C lowering therapy. Patient follows with Dr Sofia Caba. He had his CABG at [facility] with Dr Laguerre (CABG was done two years ago). His PCP is Desean.   Patient was first started on statins two years ago. He was taken off the statin about 3 months ago because of hepatomegaly. No statin associated myopathy reported. Patient has never gotten a Lp(a). He has been off the brilinta since October and has been on baby Aspirin.   Patient does not have any complains of chest pain, SOB, palpitations reported.  SH: ~25 pack year hx (quit 2 years ago), used to be a heavy drinker (quit two years ago), Currently drinks 2-3 beers on the weekends.  FH: - Kids: healthy - 2 Brothers: 39 years stroke stents, 60 years stents placed  - Father: CABG at 68 - Mother: CABG at ~60  #HLD - c/w Zetia 10 mg for now  - Lipitor stopped because of LFTs and hepatomegaly  - lipid (10/2023): , LDL-C 160, HDL 53 - on lipitor 80 mg and zetia 10 - will get approval for leqvio   #HTN - will follow up with PCP

## 2024-01-03 NOTE — DISCUSSION/SUMMARY
[FreeTextEntry1] : This is a 48-year-old male with past medical history significant for hypertension, hypercholesterolemia, peripheral arterial disease, coronary artery disease, status post coronary artery bypass surgery a March 25, 2022, (LIMA to the LAD and left radial to the obtuse marginal artery), status post right superficial femoral artery stent July 10, 2023, history of left superficial femoral artery occlusion statin intolerance, who comes in for lipid consultation. The patient is followed closely by his cardiologist, and his peripheral arterial intervention last. The Patient follows up with Dr Sofia Caba and had his CABG at [facility] with Dr Laguerre.  His PCP is Desean.  The Patient was first started on statins two years ago. He was taken off the statin about 3 months ago because of hepatomegaly which could represent a fatty liver/JETER. No statin associated myopathy reported.  He was born in [place] has no history rheumatic fever.  He does not drink excessive caffeine. Currently drinks 2-3 beers on the weekends and was drinking more at a younger age. Cardiac risk factors include hypertension, hyperlipidemia, history of smoking for approximately 25 years (stopped 2 years ago), and positive family's atherosclerotic heart disease ( 2 Brothers: 39 years stroke stents, 60 years stents placed,  Father: CABG at 68, Mother: CABG at ~60). The patient had been on Lipitor therapy but this was discontinued secondary to hepatomegaly and elevated liver function test. He is currently only taking Zetia 10 mg daily. He has been off statin for few months. Lipid profile done October 16, 2023 demonstrated cholesterol 249, HDL 53, LDL calculated 160, triglycerides 200, non-HDL cholesterol 197 mg/dL. The above noted results were supposedly on atorvastatin and Zetia therapy. This patient's LDL target is less than 70 mg/dL. If he is unable to tolerate statin therapy is a candidate for injectable lipid-lowering therapy.  He prefers to try Leqvio which is subcutaneous injection 3 times year 1, followed by twice a year thereafter and is a small interfering mRNA molecule. If he does not get coverage for Leqvio therapy, he would then consider PCSK9 biweekly injectable therapy. The patient's blood pressure is elevated today and he will follow-up with his primary cardiologist primary care physician. He will follow-up with me in 1 month, at which point he should be on some form of injectable lipid-lowering therapy and continue Zetia. Lifestyle modification was reinforced.  I will for the patient the opportunity work with our registered dietitian.  He reports that he is on a good diet currently. The patient understands that aerobic exercises must be increased to 40 minutes 4 times per week. A detailed discussion of lifestyle modification was done today. The patient has a good understanding of the diagnosis, and treatment plan. Lifestyle modification was also outlined.  Thank you for allowing to participate in the care of your patient.  Blue does not hesitate to call if he have any questions.

## 2024-01-03 NOTE — PHYSICAL EXAM

## 2024-01-04 ENCOUNTER — APPOINTMENT (OUTPATIENT)
Dept: CARDIOLOGY | Facility: CLINIC | Age: 49
End: 2024-01-04

## 2024-01-20 RX ORDER — EVOLOCUMAB 140 MG/ML
140 INJECTION, SOLUTION SUBCUTANEOUS
Qty: 6 | Refills: 1 | Status: ACTIVE | COMMUNITY
Start: 2024-01-19 | End: 1900-01-01

## 2024-01-31 NOTE — ED ADULT NURSE NOTE - NSFALLRSKASSESASSIST_ED_ALL_ED
Fax received from [facility] yes

## 2024-02-05 ENCOUNTER — APPOINTMENT (OUTPATIENT)
Dept: CARDIOLOGY | Facility: CLINIC | Age: 49
End: 2024-02-05

## 2024-02-07 ENCOUNTER — APPOINTMENT (OUTPATIENT)
Dept: CARDIOLOGY | Facility: CLINIC | Age: 49
End: 2024-02-07
Payer: COMMERCIAL

## 2024-02-07 VITALS
HEART RATE: 72 BPM | BODY MASS INDEX: 29.82 KG/M2 | OXYGEN SATURATION: 100 % | WEIGHT: 190 LBS | SYSTOLIC BLOOD PRESSURE: 143 MMHG | HEIGHT: 67 IN | DIASTOLIC BLOOD PRESSURE: 85 MMHG

## 2024-02-07 PROCEDURE — 99215 OFFICE O/P EST HI 40 MIN: CPT

## 2024-02-08 NOTE — PHYSICAL EXAM
[General Appearance - In No Acute Distress] : no acute distress [Normal Conjunctiva] : the conjunctiva exhibited no abnormalities [Normal Oral Mucosa] : normal oral mucosa [Auscultation Breath Sounds / Voice Sounds] : lungs were clear to auscultation bilaterally [Heart Sounds] : normal S1 and S2 [Murmurs] : no murmurs present [Edema] : no peripheral edema present [Abdomen Soft] : soft [Abdomen Tenderness] : non-tender [Abnormal Walk] : normal gait [Cyanosis, Localized] : no localized cyanosis [] : no rash [Skin Lesions] : no skin lesions [No Skin Ulcers] : no skin ulcer [Oriented To Time, Place, And Person] : oriented to person, place, and time [Impaired Insight] : insight and judgment were intact [Affect] : the affect was normal [FreeTextEntry1] : 1+ palpable  PT/DP bilateral

## 2024-02-08 NOTE — REASON FOR VISIT
[Follow-Up - Clinic] : a clinic follow-up of [Peripheral Vascular Disease] : peripheral vascular disease [FreeTextEntry1] : 47 yo M with CAD s/p CABG x 2 (LIMA to LAD, L Rad to OM), preserved EF, PAD with known R and L SFA  with claudication s/l bilateral SFA intervention, HTN who returns for vascular medicine follow-up.   Here for follow-up. Still hasn't done Liver MRI to follow-up liver lesions seen on chest CT.  Saw Dr. Gaston, plan was to get insurance approval for Gibran, He can't afford the co-pay, he has no insurance. They are going to see what the copay is for repatha. Either way will need to work with patient services and the company to see what can be offered.    Otherwise he is walking a lot. No CP, SOB, palpitations. No claudication. His leg swelling that happens with being on his feet all day is controlled with compression stockings. His weight is stable. We reviewed how much salt he is eating.   Relevant PMHx:  He has claudication after walking a little over a block. He has had a toe ulcer in the past that took many months to heal. He had a CTA and arterial duplex done previously. He has known  of the bilateral SFAs. He denies any CP, SOB or palpitations. He walks every day; has to stop for relief from pain but then keeps walking. No active wounds now.  He is on DAPT, high intensity statin and zetia. s/p bilateral SFA stenting of the CTOs.  Non-invasive testin2022: RABI 0.49, LABI 0.56 CTA: RSFA ostial moderate disease, distal RSFA  with reconstitution LSFA with ostial occlusion and reconstitution at the L Popliteal  No BTK disease.

## 2024-02-08 NOTE — REVIEW OF SYSTEMS
[Lower Ext Edema] : lower extremity edema [Negative] : Heme/Lymph [Leg Claudication] : no intermittent leg claudication

## 2024-02-08 NOTE — ASSESSMENT
[FreeTextEntry1] : #PAD with lifestyle limiting claudication, prior R toe wound with delayed healing. Known CTOs bilateral SFAs. No BTK disease. s/p RSFA IVL, DCBA and selective stenting of the proximal and distal SFA JEN x2 and LSFA IVL, DCBA and selective stenting of the proximal and distal SFA JEN x2.  No further claudication, doing well.   -exercise therapy; GDMT; RF modification -home blood pressure monitoring; has had two office visits with elevated BP, he says it is normal at home so will get additional data, to review next visit  -hepatic steatosis on ultrasound; hepatic lesion seen on chest CT, recommended liver MRI (still waiting to follow-up with primary care) -LFTs still abnormal off statin -polyvascular disease with lipid profile far from goal; only on Zetia, would benefit from additional therapies; given issue with LFTs and hepatic lesion work-up, seeing Dr. Gaston to get on alternative agent, now cost is likely an issue given lack of insurance, will follow-up with Dr. Gaston -f/u 3 months -continue ASA 81mg daily   #CAD s/p 2vCABG, preserved LVEF -GDMT, working on HL therapy   #HTN: home BPs 120-130/80s, Told him to continue to check at home. If has elevated pressures will need to titrate his meds.   f/u 6 months

## 2024-03-09 NOTE — DISCHARGE NOTE PROVIDER - NSDCADMDATE_GEN_ALL_CORE_FT
Patient seen and evaluated at approximately 03:45  She is now comfortable with epidural  Visit Vitals  /68   Pulse 63   Temp 98 °F (36.7 °C) (Oral)   Resp 18   Ht 5' 5\" (1.651 m)   Wt 74 kg   LMP 2023 (Approximate)   SpO2 97%   BMI 27.15 kg/m²     FHTs: 125, moderate variability, accelerations present, category I  Tresckow: Q1-3 minutes  SVE: 5/80/0    Assessment and Plan:  The patient is a 27 year-old  at 39-5/7 weeks gestation here with spontaneous labor  Patient now comfortable with epidural  FHTs reassuring, GBS negative  Labor: Continue expectant management.  Will hold off on augmentation with oxytocin as patient lópez spontaneously with adequate frequency and has made cervical change.  We did discuss possible amniotomy at time of next check and risks and benefits of amniotomy.  Will plan cervical check in 2-3 hours.  BPs improved.  Labs normal and urine protein/creatinine ratio to low too calculate.  We discussed possible gestational hypertension, though not yet meeting criteria.  Will continue to follow BPs closely.  All questions answered    Maria T Mcdonald MD       24-Mar-2022 14:47

## 2024-04-10 RX ORDER — TICAGRELOR 90 MG/1
90 TABLET ORAL TWICE DAILY
Qty: 180 | Refills: 3 | Status: DISCONTINUED | COMMUNITY
Start: 2022-07-20 | End: 2024-04-10

## 2024-05-09 ENCOUNTER — OUTPATIENT (OUTPATIENT)
Dept: OUTPATIENT SERVICES | Facility: HOSPITAL | Age: 49
LOS: 1 days | End: 2024-05-09
Payer: SELF-PAY

## 2024-05-09 ENCOUNTER — APPOINTMENT (OUTPATIENT)
Dept: FAMILY MEDICINE | Facility: HOSPITAL | Age: 49
End: 2024-05-09

## 2024-05-09 VITALS
HEART RATE: 76 BPM | BODY MASS INDEX: 29.13 KG/M2 | DIASTOLIC BLOOD PRESSURE: 76 MMHG | WEIGHT: 186 LBS | OXYGEN SATURATION: 99 % | RESPIRATION RATE: 16 BRPM | TEMPERATURE: 98.7 F | SYSTOLIC BLOOD PRESSURE: 134 MMHG

## 2024-05-09 DIAGNOSIS — Z95.1 PRESENCE OF AORTOCORONARY BYPASS GRAFT: Chronic | ICD-10-CM

## 2024-05-09 DIAGNOSIS — Z00.00 ENCOUNTER FOR GENERAL ADULT MEDICAL EXAMINATION W/OUT ABNORMAL FINDINGS: ICD-10-CM

## 2024-05-09 DIAGNOSIS — Z00.00 ENCOUNTER FOR GENERAL ADULT MEDICAL EXAMINATION WITHOUT ABNORMAL FINDINGS: ICD-10-CM

## 2024-05-09 NOTE — HEALTH RISK ASSESSMENT
[Good] : ~his/her~  mood as  good [Yes] : Yes [4 or more  times a week (4 pts)] : 4 or more  times a week (4 points) [1 or 2 (0 pts)] : 1 or 2 (0 points) [Never (0 pts)] : Never (0 points) [Audit-CScore] : 4 [Never] : Never

## 2024-05-09 NOTE — PHYSICAL EXAM
[No Acute Distress] : no acute distress [Well-Appearing] : well-appearing [Normal Voice/Communication] : normal voice/communication [Normal Sclera/Conjunctiva] : normal sclera/conjunctiva [Normal Outer Ear/Nose] : the outer ears and nose were normal in appearance [Supple] : supple [No Respiratory Distress] : no respiratory distress  [No Accessory Muscle Use] : no accessory muscle use [Clear to Auscultation] : lungs were clear to auscultation bilaterally [Normal Rate] : normal rate  [Regular Rhythm] : with a regular rhythm [Normal S1, S2] : normal S1 and S2 [Soft] : abdomen soft [Non Tender] : non-tender [Non-distended] : non-distended [No Joint Swelling] : no joint swelling [Grossly Normal Strength/Tone] : grossly normal strength/tone [Coordination Grossly Intact] : coordination grossly intact [No Focal Deficits] : no focal deficits [Normal Affect] : the affect was normal [Normal Insight/Judgement] : insight and judgment were intact

## 2024-05-09 NOTE — HISTORY OF PRESENT ILLNESS
[FreeTextEntry1] : CPE, f/u transaminases [de-identified] : 49 yo M, PMH of CAD s/p CABG x 2 (LIMA to LAD, L Rad to OM), preserved EF, PAD with known R and L SFA  with claudication s/l bilateral SFA intervention, HTN, HLD, presenting for CPE. Pt feels well, afebrile, asx, with no concerns to report at this time. Pt denies ha, dizziness, fevers, cp, SOB, palpitations, n/v/ab pain, changes in urination or bm. No other urgent sxs or concerns reported.

## 2024-05-10 DIAGNOSIS — I74.01 SADDLE EMBOLUS OF ABDOMINAL AORTA: ICD-10-CM

## 2024-05-10 LAB
ALBUMIN SERPL ELPH-MCNC: 4.9 G/DL
ALP BLD-CCNC: 102 U/L
ALT SERPL-CCNC: 147 U/L
AST SERPL-CCNC: 93 U/L
BILIRUB DIRECT SERPL-MCNC: 0.3 MG/DL
BILIRUB INDIRECT SERPL-MCNC: 0.4 MG/DL
BILIRUB SERPL-MCNC: 0.7 MG/DL
PROT SERPL-MCNC: 7.9 G/DL

## 2024-05-10 PROCEDURE — G0463: CPT

## 2024-05-10 PROCEDURE — 80076 HEPATIC FUNCTION PANEL: CPT

## 2024-05-10 PROCEDURE — 36415 COLL VENOUS BLD VENIPUNCTURE: CPT

## 2024-05-29 ENCOUNTER — APPOINTMENT (OUTPATIENT)
Dept: CARDIOLOGY | Facility: CLINIC | Age: 49
End: 2024-05-29
Payer: COMMERCIAL

## 2024-05-29 VITALS
HEIGHT: 67 IN | DIASTOLIC BLOOD PRESSURE: 95 MMHG | BODY MASS INDEX: 29.19 KG/M2 | WEIGHT: 186 LBS | SYSTOLIC BLOOD PRESSURE: 163 MMHG | HEART RATE: 71 BPM | OXYGEN SATURATION: 98 %

## 2024-05-29 DIAGNOSIS — R74.01 ELEVATION OF LEVELS OF LIVER TRANSAMINASE LEVELS: ICD-10-CM

## 2024-05-29 DIAGNOSIS — E78.2 MIXED HYPERLIPIDEMIA: ICD-10-CM

## 2024-05-29 DIAGNOSIS — I10 ESSENTIAL (PRIMARY) HYPERTENSION: ICD-10-CM

## 2024-05-29 DIAGNOSIS — I73.9 PERIPHERAL VASCULAR DISEASE, UNSPECIFIED: ICD-10-CM

## 2024-05-29 DIAGNOSIS — I25.10 ATHEROSCLEROTIC HEART DISEASE OF NATIVE CORONARY ARTERY W/OUT ANGINA PECTORIS: ICD-10-CM

## 2024-05-29 DIAGNOSIS — Z87.891 PERSONAL HISTORY OF NICOTINE DEPENDENCE: ICD-10-CM

## 2024-05-29 DIAGNOSIS — Z95.1 PRESENCE OF AORTOCORONARY BYPASS GRAFT: ICD-10-CM

## 2024-05-29 PROCEDURE — 99215 OFFICE O/P EST HI 40 MIN: CPT

## 2024-05-31 PROBLEM — I10 BENIGN ESSENTIAL HYPERTENSION: Status: ACTIVE | Noted: 2022-02-04

## 2024-05-31 PROBLEM — R74.01 TRANSAMINITIS: Status: ACTIVE | Noted: 2023-08-09

## 2024-05-31 PROBLEM — Z95.1 S/P CABG X 2: Status: ACTIVE | Noted: 2022-04-05

## 2024-05-31 PROBLEM — I25.10 CAD (CORONARY ARTERY DISEASE): Status: ACTIVE | Noted: 2022-04-05

## 2024-05-31 PROBLEM — Z87.891 FORMER SMOKER: Status: ACTIVE | Noted: 2023-10-16

## 2024-05-31 PROBLEM — I73.9 PAD (PERIPHERAL ARTERY DISEASE): Status: ACTIVE | Noted: 2022-02-04

## 2024-05-31 PROBLEM — E78.2 MIXED HYPERLIPIDEMIA: Status: ACTIVE | Noted: 2023-11-08

## 2024-05-31 LAB
CHOLEST SERPL-MCNC: 166 MG/DL
HDLC SERPL-MCNC: 52 MG/DL
LDLC SERPL CALC-MCNC: 101 MG/DL
NONHDLC SERPL-MCNC: 115 MG/DL
TRIGL SERPL-MCNC: 74 MG/DL

## 2024-05-31 NOTE — REASON FOR VISIT
[Follow-Up - Clinic] : a clinic follow-up of [Coronary Artery Disease] : coronary artery disease [Peripheral Vascular Disease] : peripheral vascular disease [FreeTextEntry1] : 47 yo M with CAD s/p CABG x 2 (LIMA to LAD, L Rad to OM), preserved EF, PAD with known R and L SFA  with claudication s/l bilateral SFA intervention, HTN who returns for vascular medicine follow-up.   Here for follow-up.  No progress in terms of alternative therapies for HL. Back on statin and zetia. LFTs down from before. No recent lipid panel.   Otherwise he is walking a lot. No CP, SOB, palpitations. No claudication. His leg swelling is controlled with compression stockings. His weight is stable. BP mildly elevated; repeat improved, 129/80, 131/86.  Relevant PMHx:  He has claudication after walking a little over a block. He has had a toe ulcer in the past that took many months to heal. He had a CTA and arterial duplex done previously. He has known  of the bilateral SFAs. He denies any CP, SOB or palpitations. He walks every day; has to stop for relief from pain but then keeps walking. No active wounds now.  He is on DAPT, high intensity statin and zetia. s/p bilateral SFA stenting of the CTOs.  Non-invasive testin2022: RABI 0.49, LABI 0.56 CTA: RSFA ostial moderate disease, distal RSFA  with reconstitution LSFA with ostial occlusion and reconstitution at the L Popliteal  No BTK disease.

## 2024-05-31 NOTE — REVIEW OF SYSTEMS
[Lower Ext Edema] : lower extremity edema [Leg Claudication] : no intermittent leg claudication [Negative] : Heme/Lymph

## 2024-06-28 ENCOUNTER — RX RENEWAL (OUTPATIENT)
Age: 49
End: 2024-06-28

## 2024-08-07 ENCOUNTER — APPOINTMENT (OUTPATIENT)
Dept: CARDIOLOGY | Facility: CLINIC | Age: 49
End: 2024-08-07

## 2024-08-07 PROCEDURE — 99215 OFFICE O/P EST HI 40 MIN: CPT

## 2024-08-08 NOTE — REASON FOR VISIT
[Follow-Up - Clinic] : a clinic follow-up of [Peripheral Vascular Disease] : peripheral vascular disease [FreeTextEntry1] : 48 yo M with CAD s/p CABG x 2 (LIMA to LAD, L Rad to OM), preserved EF, PAD with known R and L SFA  with claudication s/l bilateral SFA intervention, HTN who returns for vascular medicine follow-up.   Here for follow-up.  No progress in terms of alternative therapies for HL; no insurance limits access. Back on statin and zetia. LFTs and LDL down; last labs 3 months ago. Otherwise he is walking a lot. No CP, SOB, palpitations. No claudication. His leg swelling is controlled with compression stockings, L>R. His weight is stable. BP mildly elevated; repeat improved, 125/80, 134/86.  Relevant PMHx:  He has claudication after walking a little over a block. He has had a toe ulcer in the past that took many months to heal. He had a CTA and arterial duplex done previously. He has known  of the bilateral SFAs. He denies any CP, SOB or palpitations. He walks every day; has to stop for relief from pain but then keeps walking. No active wounds now.  He is on DAPT, high intensity statin and zetia. s/p bilateral SFA stenting of the CTOs.  Non-invasive testin2022: RABI 0.49, LABI 0.56 CTA: RSFA ostial moderate disease, distal RSFA  with reconstitution LSFA with ostial occlusion and reconstitution at the L Popliteal  No BTK disease.

## 2024-08-08 NOTE — REASON FOR VISIT
[Follow-Up - Clinic] : a clinic follow-up of [Peripheral Vascular Disease] : peripheral vascular disease [FreeTextEntry1] : 50 yo M with CAD s/p CABG x 2 (LIMA to LAD, L Rad to OM), preserved EF, PAD with known R and L SFA  with claudication s/l bilateral SFA intervention, HTN who returns for vascular medicine follow-up.   Here for follow-up.  No progress in terms of alternative therapies for HL; no insurance limits access. Back on statin and zetia. LFTs and LDL down; last labs 3 months ago. Otherwise he is walking a lot. No CP, SOB, palpitations. No claudication. His leg swelling is controlled with compression stockings, L>R. His weight is stable. BP mildly elevated; repeat improved, 125/80, 134/86.  Relevant PMHx:  He has claudication after walking a little over a block. He has had a toe ulcer in the past that took many months to heal. He had a CTA and arterial duplex done previously. He has known  of the bilateral SFAs. He denies any CP, SOB or palpitations. He walks every day; has to stop for relief from pain but then keeps walking. No active wounds now.  He is on DAPT, high intensity statin and zetia. s/p bilateral SFA stenting of the CTOs.  Non-invasive testin2022: RABI 0.49, LABI 0.56 CTA: RSFA ostial moderate disease, distal RSFA  with reconstitution LSFA with ostial occlusion and reconstitution at the L Popliteal  No BTK disease.

## 2024-08-08 NOTE — ASSESSMENT
[FreeTextEntry1] : #PAD with lifestyle limiting claudication, prior R toe wound with delayed healing. Known CTOs bilateral SFAs. No BTK disease. s/p RSFA IVL, DCBA and selective stenting of the proximal and distal SFA JEN x2 and LSFA IVL, DCBA and selective stenting of the proximal and distal SFA JEN x2.  No further claudication, doing well.   -exercise therapy; GDMT; RF modification -home blood pressure monitoring -hepatic steatosis on ultrasound; hepatic lesion seen on chest CT, recommended liver MRI (still has not done) -LFTs abnormal but stable on statin -repeat lipid panel today, LFTs -polyvascular disease, elevated Lpa, with lipid profile improved but not yet at goal -f/u 3 months -continue ASA 81mg daily  -continue statin and zetia  #CAD s/p 2vCABG, preserved LVEF -GDMT, working on HL therapy   #HTN: home BPs 120-130/80s, Told him to continue to check at home.  f/u 3 months

## 2024-10-15 NOTE — HISTORY OF PRESENT ILLNESS
[FreeTextEntry2] : 45 y/o male w/ PMH HTN, PAD presents as a new patient to establish care and for post ED discharge visit. Patient was in the ER at [place] [facility] for a foot lesion on his right foot, possible necrotic in nature. Dr. Chino, vascular surgeon, performed a VIN which was abnormal and recommended an angiogram with possible stents and was referred to this practice. Patient smoked 1 pack a day for over 25 years and quick a week ago. Patient with no other complaints. Denies fevers, chills, pain on foot, SOB, chest pain. \par  Fall Risk

## 2024-11-13 ENCOUNTER — APPOINTMENT (OUTPATIENT)
Dept: CARDIOLOGY | Facility: CLINIC | Age: 49
End: 2024-11-13
Payer: COMMERCIAL

## 2024-11-13 ENCOUNTER — NON-APPOINTMENT (OUTPATIENT)
Age: 49
End: 2024-11-13

## 2024-11-13 VITALS
HEART RATE: 86 BPM | WEIGHT: 190 LBS | SYSTOLIC BLOOD PRESSURE: 149 MMHG | DIASTOLIC BLOOD PRESSURE: 83 MMHG | BODY MASS INDEX: 29.76 KG/M2 | OXYGEN SATURATION: 99 %

## 2024-11-13 DIAGNOSIS — E78.2 MIXED HYPERLIPIDEMIA: ICD-10-CM

## 2024-11-13 DIAGNOSIS — R74.01 ELEVATION OF LEVELS OF LIVER TRANSAMINASE LEVELS: ICD-10-CM

## 2024-11-13 DIAGNOSIS — Z95.1 PRESENCE OF AORTOCORONARY BYPASS GRAFT: ICD-10-CM

## 2024-11-13 DIAGNOSIS — I25.10 ATHEROSCLEROTIC HEART DISEASE OF NATIVE CORONARY ARTERY W/OUT ANGINA PECTORIS: ICD-10-CM

## 2024-11-13 DIAGNOSIS — I73.9 PERIPHERAL VASCULAR DISEASE, UNSPECIFIED: ICD-10-CM

## 2024-11-13 DIAGNOSIS — Z87.891 PERSONAL HISTORY OF NICOTINE DEPENDENCE: ICD-10-CM

## 2024-11-13 DIAGNOSIS — I10 ESSENTIAL (PRIMARY) HYPERTENSION: ICD-10-CM

## 2024-11-13 PROCEDURE — 93000 ELECTROCARDIOGRAM COMPLETE: CPT

## 2024-11-13 PROCEDURE — 99215 OFFICE O/P EST HI 40 MIN: CPT

## 2024-11-21 ENCOUNTER — OUTPATIENT (OUTPATIENT)
Dept: OUTPATIENT SERVICES | Facility: HOSPITAL | Age: 49
LOS: 1 days | End: 2024-11-21

## 2024-11-21 DIAGNOSIS — Z95.1 PRESENCE OF AORTOCORONARY BYPASS GRAFT: Chronic | ICD-10-CM

## 2024-11-21 DIAGNOSIS — E78.2 MIXED HYPERLIPIDEMIA: ICD-10-CM

## 2024-11-21 DIAGNOSIS — Z95.1 PRESENCE OF AORTOCORONARY BYPASS GRAFT: ICD-10-CM

## 2024-11-21 DIAGNOSIS — I73.9 PERIPHERAL VASCULAR DISEASE, UNSPECIFIED: ICD-10-CM

## 2024-11-21 LAB
ALBUMIN SERPL ELPH-MCNC: 4.5 G/DL
ALP BLD-CCNC: 111 U/L
ALT SERPL-CCNC: 261 U/L
ANION GAP SERPL CALC-SCNC: 11 MMOL/L
AST SERPL-CCNC: 190 U/L
BILIRUB SERPL-MCNC: 0.4 MG/DL
BUN SERPL-MCNC: 11 MG/DL
CALCIUM SERPL-MCNC: 9.3 MG/DL
CHLORIDE SERPL-SCNC: 103 MMOL/L
CHOLEST SERPL-MCNC: 159 MG/DL
CO2 SERPL-SCNC: 26 MMOL/L
CREAT SERPL-MCNC: 0.77 MG/DL
EGFR: 108 ML/MIN/1.73M2
ESTIMATED AVERAGE GLUCOSE: 131 MG/DL
GLUCOSE SERPL-MCNC: 131 MG/DL
HBA1C MFR BLD HPLC: 6.2 %
HDLC SERPL-MCNC: 46 MG/DL
LDLC SERPL CALC-MCNC: 95 MG/DL
NONHDLC SERPL-MCNC: 113 MG/DL
POTASSIUM SERPL-SCNC: 4 MMOL/L
PROT SERPL-MCNC: 7.5 G/DL
SODIUM SERPL-SCNC: 140 MMOL/L
TRIGL SERPL-MCNC: 96 MG/DL

## 2024-11-22 ENCOUNTER — OUTPATIENT (OUTPATIENT)
Dept: OUTPATIENT SERVICES | Facility: HOSPITAL | Age: 49
LOS: 1 days | End: 2024-11-22
Payer: SELF-PAY

## 2024-11-22 ENCOUNTER — RESULT REVIEW (OUTPATIENT)
Age: 49
End: 2024-11-22

## 2024-11-22 DIAGNOSIS — Z95.1 PRESENCE OF AORTOCORONARY BYPASS GRAFT: Chronic | ICD-10-CM

## 2024-11-22 DIAGNOSIS — I73.9 PERIPHERAL VASCULAR DISEASE, UNSPECIFIED: ICD-10-CM

## 2024-11-22 PROCEDURE — 93925 LOWER EXTREMITY STUDY: CPT

## 2024-11-22 PROCEDURE — 93925 LOWER EXTREMITY STUDY: CPT | Mod: 26

## 2024-12-04 ENCOUNTER — APPOINTMENT (OUTPATIENT)
Dept: CARDIOLOGY | Facility: CLINIC | Age: 49
End: 2024-12-04
Payer: COMMERCIAL

## 2024-12-04 VITALS — DIASTOLIC BLOOD PRESSURE: 78 MMHG | SYSTOLIC BLOOD PRESSURE: 148 MMHG | OXYGEN SATURATION: 99 % | HEART RATE: 85 BPM

## 2024-12-04 DIAGNOSIS — Z87.891 PERSONAL HISTORY OF NICOTINE DEPENDENCE: ICD-10-CM

## 2024-12-04 DIAGNOSIS — I25.10 ATHEROSCLEROTIC HEART DISEASE OF NATIVE CORONARY ARTERY W/OUT ANGINA PECTORIS: ICD-10-CM

## 2024-12-04 DIAGNOSIS — I73.9 PERIPHERAL VASCULAR DISEASE, UNSPECIFIED: ICD-10-CM

## 2024-12-04 DIAGNOSIS — R74.01 ELEVATION OF LEVELS OF LIVER TRANSAMINASE LEVELS: ICD-10-CM

## 2024-12-04 DIAGNOSIS — I10 ESSENTIAL (PRIMARY) HYPERTENSION: ICD-10-CM

## 2024-12-04 DIAGNOSIS — Z95.1 PRESENCE OF AORTOCORONARY BYPASS GRAFT: ICD-10-CM

## 2024-12-04 DIAGNOSIS — E78.2 MIXED HYPERLIPIDEMIA: ICD-10-CM

## 2024-12-04 PROCEDURE — 99215 OFFICE O/P EST HI 40 MIN: CPT

## 2024-12-09 ENCOUNTER — INPATIENT (INPATIENT)
Facility: HOSPITAL | Age: 49
LOS: 0 days | Discharge: HOME | DRG: 301 | End: 2024-12-10
Attending: STUDENT IN AN ORGANIZED HEALTH CARE EDUCATION/TRAINING PROGRAM | Admitting: STUDENT IN AN ORGANIZED HEALTH CARE EDUCATION/TRAINING PROGRAM
Payer: SELF-PAY

## 2024-12-09 ENCOUNTER — TRANSCRIPTION ENCOUNTER (OUTPATIENT)
Age: 49
End: 2024-12-09

## 2024-12-09 VITALS
HEART RATE: 83 BPM | SYSTOLIC BLOOD PRESSURE: 157 MMHG | DIASTOLIC BLOOD PRESSURE: 75 MMHG | OXYGEN SATURATION: 98 % | RESPIRATION RATE: 15 BRPM | TEMPERATURE: 98 F

## 2024-12-09 DIAGNOSIS — I73.9 PERIPHERAL VASCULAR DISEASE, UNSPECIFIED: ICD-10-CM

## 2024-12-09 DIAGNOSIS — E78.5 HYPERLIPIDEMIA, UNSPECIFIED: ICD-10-CM

## 2024-12-09 DIAGNOSIS — Z95.1 PRESENCE OF AORTOCORONARY BYPASS GRAFT: Chronic | ICD-10-CM

## 2024-12-09 LAB
ANION GAP SERPL CALC-SCNC: 16 MMOL/L — SIGNIFICANT CHANGE UP (ref 5–17)
BUN SERPL-MCNC: 19 MG/DL — SIGNIFICANT CHANGE UP (ref 7–23)
CALCIUM SERPL-MCNC: 10 MG/DL — SIGNIFICANT CHANGE UP (ref 8.4–10.5)
CHLORIDE SERPL-SCNC: 99 MMOL/L — SIGNIFICANT CHANGE UP (ref 96–108)
CO2 SERPL-SCNC: 25 MMOL/L — SIGNIFICANT CHANGE UP (ref 22–31)
CREAT SERPL-MCNC: 0.81 MG/DL — SIGNIFICANT CHANGE UP (ref 0.5–1.3)
EGFR: 107 ML/MIN/1.73M2 — SIGNIFICANT CHANGE UP
GLUCOSE SERPL-MCNC: 110 MG/DL — HIGH (ref 70–99)
HCT VFR BLD CALC: 45.8 % — SIGNIFICANT CHANGE UP (ref 39–50)
HGB BLD-MCNC: 15.6 G/DL — SIGNIFICANT CHANGE UP (ref 13–17)
MCHC RBC-ENTMCNC: 31.1 PG — SIGNIFICANT CHANGE UP (ref 27–34)
MCHC RBC-ENTMCNC: 34.1 G/DL — SIGNIFICANT CHANGE UP (ref 32–36)
MCV RBC AUTO: 91.4 FL — SIGNIFICANT CHANGE UP (ref 80–100)
NRBC # BLD: 0 /100 WBCS — SIGNIFICANT CHANGE UP (ref 0–0)
PLATELET # BLD AUTO: 217 K/UL — SIGNIFICANT CHANGE UP (ref 150–400)
POTASSIUM SERPL-MCNC: 3.6 MMOL/L — SIGNIFICANT CHANGE UP (ref 3.5–5.3)
POTASSIUM SERPL-SCNC: 3.6 MMOL/L — SIGNIFICANT CHANGE UP (ref 3.5–5.3)
RBC # BLD: 5.01 M/UL — SIGNIFICANT CHANGE UP (ref 4.2–5.8)
RBC # FLD: 12.6 % — SIGNIFICANT CHANGE UP (ref 10.3–14.5)
SODIUM SERPL-SCNC: 140 MMOL/L — SIGNIFICANT CHANGE UP (ref 135–145)
WBC # BLD: 12.1 K/UL — HIGH (ref 3.8–10.5)
WBC # FLD AUTO: 12.1 K/UL — HIGH (ref 3.8–10.5)

## 2024-12-09 PROCEDURE — 93010 ELECTROCARDIOGRAM REPORT: CPT

## 2024-12-09 RX ORDER — AMLODIPINE BESYLATE 10 MG/1
10 TABLET ORAL DAILY
Refills: 0 | Status: DISCONTINUED | OUTPATIENT
Start: 2024-12-09 | End: 2024-12-10

## 2024-12-09 RX ORDER — CLOPIDOGREL 75 MG/1
1 TABLET, FILM COATED ORAL
Qty: 90 | Refills: 3
Start: 2024-12-09 | End: 2025-12-03

## 2024-12-09 RX ORDER — CLOPIDOGREL 75 MG/1
75 TABLET, FILM COATED ORAL DAILY
Refills: 0 | Status: DISCONTINUED | OUTPATIENT
Start: 2024-12-09 | End: 2024-12-10

## 2024-12-09 RX ORDER — CLOPIDOGREL 75 MG/1
1 TABLET, FILM COATED ORAL
Qty: 0 | Refills: 0 | DISCHARGE
Start: 2024-12-09

## 2024-12-09 RX ORDER — SODIUM CHLORIDE 9 MG/ML
1000 INJECTION, SOLUTION INTRAMUSCULAR; INTRAVENOUS; SUBCUTANEOUS
Refills: 0 | Status: DISCONTINUED | OUTPATIENT
Start: 2024-12-09 | End: 2024-12-10

## 2024-12-09 RX ORDER — EVOLOCUMAB 140 MG/ML
140 INJECTION, SOLUTION SUBCUTANEOUS
Refills: 0 | DISCHARGE

## 2024-12-09 RX ORDER — METOPROLOL TARTRATE 100 MG/1
50 TABLET, FILM COATED ORAL DAILY
Refills: 0 | Status: DISCONTINUED | OUTPATIENT
Start: 2024-12-09 | End: 2024-12-10

## 2024-12-09 RX ORDER — EZETIMIBE 10 MG
10 TABLET ORAL DAILY
Refills: 0 | Status: DISCONTINUED | OUTPATIENT
Start: 2024-12-09 | End: 2024-12-10

## 2024-12-09 RX ORDER — EVOLOCUMAB 140 MG/ML
0 INJECTION, SOLUTION SUBCUTANEOUS
Qty: 0 | Refills: 0 | DISCHARGE
Start: 2024-12-09

## 2024-12-09 RX ADMIN — SODIUM CHLORIDE 100 MILLILITER(S): 9 INJECTION, SOLUTION INTRAMUSCULAR; INTRAVENOUS; SUBCUTANEOUS at 17:10

## 2024-12-09 NOTE — DISCHARGE NOTE PROVIDER - PROVIDER TOKENS
<<----- Click to add NO pertinent Past Medical History No pertinent past medical history PROVIDER:[TOKEN:[[id number]],ESTABLISHEDPATIENT:[T]] PROVIDER:[TOKEN:[[id number]],FOLLOWUP:[2 weeks]]

## 2024-12-09 NOTE — CHART NOTE - NSCHARTNOTEFT_GEN_A_CORE
POST-OP NOTE    JENIFFER HE | [id number] | [facility] 03    Procedure: s/p RLE angiogram    Subjective: Patient seen and examined at bedside.  Pain is controlled.  Tolerating a diet.  Voiding appropriately.  Groin site without underlying hematoma.  Neurovascularly intact.  Denies CP, SOB, dizziness, nausea, or emesis.      Vital Signs Last 24 Hrs  T(C): 37.1 (10 Dec 2024 00:00), Max: 37.1 (10 Dec 2024 00:00)  T(F): 98.8 (10 Dec 2024 00:00), Max: 98.8 (10 Dec 2024 00:00)  HR: 77 (10 Dec 2024 00:00) (72 - 83)  BP: 129/66 (10 Dec 2024 00:00) (129/66 - 181/87)  BP(mean): 104 (09 Dec 2024 18:40) (102 - 118)  RR: 17 (10 Dec 2024 00:00) (15 - 18)  SpO2: 96% (10 Dec 2024 00:00) (95% - 98%)    Parameters below as of 10 Dec 2024 00:00  Patient On (Oxygen Delivery Method): room air      I&O's Summary                          15.6   12.10 )-----------( 217      ( 09 Dec 2024 11:57 )             45.8     12-09    140  |  99  |  19  ----------------------------<  110[H]  3.6   |  25  |  0.81    Ca    10.0      09 Dec 2024 11:57         PHYSICAL EXAM:  Gen: NAD  Resp: breathing easily, no stridor  CV: RRR  Abdomen: soft, nontender, nondistended  GI/: groin access site cdi, no underlying hematoma  Extremities: Palpable DP/PT bilaterally, BLE SILT, motor intact  Skin: Normal color, no rashes or lesions      A:  50 y/o Male with CAD s/p CABG x 2 (LIMA to LAD, L Rad to OM in 2021), preserved EF, HTN, PAD with known R and L SFA  with claudication and s/p JEN of right SFA in June 2023 and JEN x2 to LSFA in Aug 2023.  Now s/p RLE angiogram on 12/9/24.    Plan:  - Monitor overnight  - Dc in     Vascular Surgery  [id number]

## 2024-12-09 NOTE — DISCHARGE NOTE PROVIDER - NSDCCPTREATMENT_GEN_ALL_CORE_FT
PRINCIPAL PROCEDURE  Procedure: Angioplasty, artery, peripheral  Findings and Treatment: s/p stent and Lithotripsy of right SFA and Rt. Popliteal artery

## 2024-12-09 NOTE — PATIENT PROFILE ADULT - LAST TOBACCO USE (DD-MM-YY)
Please continue to take your antibiotics as prescribed.  Please see your primary care physician within 3 days for follow-up as discussed.    Please return the emergency room if develop persistent fevers, chills, altered mental status, flank pain, worsening or concerning symptoms.   24-Feb-2022

## 2024-12-09 NOTE — H&P CARDIOLOGY - HISTORY OF PRESENT ILLNESS
50 y/o Male with CAD s/p CABG x 2 (LIMA to LAD, L Rad to OM in 2021), preserved EF, HTN, PAD with known R and L SFA  with claudication and s/p JEN of right SFA in June 2023 and JEN x2 to LSFA in Aug 2023, presents for planned angiogram of lower extremity. Patient has claudication after walking a little over a block. He has had a toe ulcer in the past that took many months to heal.      Peripheral angiogram 8/15/23:  Conclusions:   s/p succesful intervention of a LSFA longCTO   s/p POBA, IVL, DCBA and JEN x 2 to the FA     Peripheral angiogram from 4/27/2023:   TASC D RSFA , unsuccessful attempt at revascularization. Crossed  distal cap but unable to cross proximal cap from pedal  access.    Peripheral angiogram from June 20, 2023:  Interventional Details   6-45 destication   SFA  crossed with .035 floppy Glide and CXI   predilated with 4 mm PTA   Shokewave angioplasty  with 6-60   DCB with 6 mm medtronic    selective sent 6-120 proximal SFA  JEN   selctive stent 6-80 distal SFA JEN overlapped with 6-40   Distal right Superficial Femoral: The initial stenosis was 99 %.  Guidewire crossing was successful.

## 2024-12-09 NOTE — DISCHARGE NOTE PROVIDER - CARE PROVIDER_API CALL
JENNIFER NEWTON  [address] [facility] [address]  [place] [address]  Phone: [phone]  Fax: ([phone]  Established Patient  Follow Up Time:    Sofia Caba [facility]  Interventional Cardiology  [address] [place] [id number]  Phone: [phone]  Fax: [phone]  Follow Up Time: 2 weeks

## 2024-12-09 NOTE — DISCHARGE NOTE PROVIDER - NSDCMRMEDTOKEN_GEN_ALL_CORE_FT
amLODIPine 10 mg oral tablet: 1 tab(s) orally once a day  aspirin 81 mg oral delayed release tablet: 1 tab(s) orally once a day  clopidogrel 75 mg oral tablet: 1 tab(s) orally once a day  ezetimibe 10 mg oral tablet: 1 tab(s) orally once a day  hydroCHLOROthiazide 25 mg oral tablet: 1 tab(s) orally once a day  metoprolol succinate 50 mg oral tablet, extended release: 1 tab(s) orally once a day   amLODIPine 10 mg oral tablet: 1 tab(s) orally once a day  aspirin 81 mg oral delayed release tablet: 1 tab(s) orally once a day  clopidogrel 75 mg oral tablet: 1 tab(s) orally once a day  evolocumab 140 mg/mL subcutaneous solution: subcutaneous once a week  ezetimibe 10 mg oral tablet: 1 tab(s) orally once a day  hydroCHLOROthiazide 25 mg oral tablet: 1 tab(s) orally once a day  metoprolol succinate 50 mg oral tablet, extended release: 1 tab(s) orally once a day

## 2024-12-09 NOTE — DISCHARGE NOTE PROVIDER - NSDCCPCAREPLAN_GEN_ALL_CORE_FT
PRINCIPAL DISCHARGE DIAGNOSIS  Diagnosis: PVD (peripheral vascular disease)  Assessment and Plan of Treatment:         SECONDARY DISCHARGE DIAGNOSES  Diagnosis: HLD (hyperlipidemia)  Assessment and Plan of Treatment: Goal is to keep LDL<70. Continue with your cholesterol medications as prescribed. Eat a heart healthy diet that is low in saturated fats and salt, and includes whole grains, fruits, vegetables and lean protein; exercise regularly (consult with your physician or cardiologist first); maintain a heart healthy weight; if you smoke - quit (A resource to help you stop smoking is the [facility] Tobacco [facility] – phone number [phone].). Continue to follow with your primary physician or cardiologist.      Diagnosis: CAD (coronary artery disease)  Assessment and Plan of Treatment: Low salt, low fat diet.   Weight management.   Take medications as prescribed.    No smoking.  Follow up appointments with your doctor(s)  as instruced.       PRINCIPAL DISCHARGE DIAGNOSIS  Diagnosis: PVD (peripheral vascular disease)  Assessment and Plan of Treatment: No heavy lifting, strenuous activity, bending, straining, or unnecessary stair climbing for 2 weeks. No driving for 2 days. You may shower 24 hours following the procedure but avoid baths/swimming for 1 week. Check your groin site for bleeding and/or swelling daily following procedure and call your doctor immediately if it occurs or if you experience increased pain at the site. Follow up with your cardiologist in 1-2 weeks. You may call [facility] if you have any questions/concerns regarding your procedure [phone].         SECONDARY DISCHARGE DIAGNOSES  Diagnosis: CAD (coronary artery disease)  Assessment and Plan of Treatment: Low salt, low fat diet.   Weight management.   Take medications as prescribed.    No smoking.  Follow up appointments with your doctor(s)  as instruced.      Diagnosis: HLD (hyperlipidemia)  Assessment and Plan of Treatment: Goal is to keep LDL<70. Continue with your cholesterol medications as prescribed. Eat a heart healthy diet that is low in saturated fats and salt, and includes whole grains, fruits, vegetables and lean protein; exercise regularly (consult with your physician or cardiologist first); maintain a heart healthy weight; if you smoke - quit (A resource to help you stop smoking is the [facility] Tobacco Control – phone number [phone].). Continue to follow with your primary physician or cardiologist.

## 2024-12-09 NOTE — DISCHARGE NOTE PROVIDER - HOSPITAL COURSE
49 year old female/male PMH CAD s/p CABG x 2 (LIMA to LAD, L Rad to OM in 2021), preserved EF, HTN, PAD with known R and L SFA  with claudication and s/p JEN of right SFA in June 2023 and JEN x2 to LSFA in Aug 2023, presents for planned angiogram of lower extremity referred by Dr. underwent a Peripheral angiogram on 12/9 with _________.   with 12/9 s/p rt SFA lithotripsy with stent and Lithotripsy to rt Popliteal artery via B/L groin. Site closed with mynx. Right mynx failed, requiring manual pressure in the lab.    . Patient remained hemodynamically stable, neurovascularly intact and chest pain free. Patient voided and ambulated and had no EKG changes post procedure.  He remained overnight for observation and pain control.  The remainder of his hospitalization was uneventful.  He was provided education regarding DAPT/statin therapy, as well as post procedure wound care instructions.  He will follow up with his private cardiologist Dr.Johnston Orr in 2 weeks and advised to return to ER with any concerning symptoms.           49 year old female/male PMH CAD s/p CABG x 2 (LIMA to LAD, L Rad to OM in 2021), preserved EF, HTN, PAD with known R and L SFA  with claudication and s/p JEN of right SFA in June 2023 and JEN x2 to LSFA in Aug 2023, presents for planned angiogram of lower extremity.  On 12/9, pt underwent a Peripheral angiogram with right SFA lithotripsy and one stent placed to the RSFA, Lithotripsy to right popliteal artery via B/L groin. Left groin site closed with Mynx. Right side Mynx failed, requiring manual pressure to righty groin in the lab.   Patient remained hemodynamically stable, neurovascularly intact and chest pain free. Patient voided and ambulated and had no EKG changes post procedure.  He remained overnight for observation and pain control.  The remainder of his hospitalization was uneventful.  He was provided education regarding DAPT/statin therapy, as well as post procedure wound care instructions.  He will follow up with his private cardiologist Dr.Johnston Orr in 2 weeks and advised to return to ER with any concerning symptoms.

## 2024-12-09 NOTE — DISCHARGE NOTE PROVIDER - CARE PROVIDERS DIRECT ADDRESSES
,DirectAddress_Unknown ,[email or web address]

## 2024-12-10 ENCOUNTER — TRANSCRIPTION ENCOUNTER (OUTPATIENT)
Age: 49
End: 2024-12-10

## 2024-12-10 VITALS
RESPIRATION RATE: 17 BRPM | DIASTOLIC BLOOD PRESSURE: 73 MMHG | TEMPERATURE: 98 F | OXYGEN SATURATION: 95 % | SYSTOLIC BLOOD PRESSURE: 130 MMHG | HEART RATE: 80 BPM

## 2024-12-10 DIAGNOSIS — I73.9 PERIPHERAL VASCULAR DISEASE, UNSPECIFIED: ICD-10-CM

## 2024-12-10 DIAGNOSIS — I10 ESSENTIAL (PRIMARY) HYPERTENSION: ICD-10-CM

## 2024-12-10 PROCEDURE — 99238 HOSP IP/OBS DSCHRG MGMT 30/<: CPT

## 2024-12-10 RX ADMIN — METOPROLOL TARTRATE 50 MILLIGRAM(S): 100 TABLET, FILM COATED ORAL at 05:12

## 2024-12-10 RX ADMIN — AMLODIPINE BESYLATE 10 MILLIGRAM(S): 10 TABLET ORAL at 05:11

## 2024-12-10 RX ADMIN — Medication 81 MILLIGRAM(S): at 05:11

## 2024-12-10 RX ADMIN — CLOPIDOGREL 75 MILLIGRAM(S): 75 TABLET, FILM COATED ORAL at 05:12

## 2024-12-10 NOTE — PROGRESS NOTE ADULT - PROBLEM SELECTOR PLAN 2
Goal is to keep LDL<70. Continue with your cholesterol medications as prescribed. Eat a heart healthy diet that is low in saturated fats and salt, and includes whole grains, fruits, vegetables and lean protein; exercise regularly (consult with your physician or cardiologist first); maintain a heart healthy weight; if you smoke - quit (A resource to help you stop smoking is the [facility] Tobacco Control – phone number [phone].). Continue to follow with your primary physician or cardiologist.

## 2024-12-10 NOTE — DISCHARGE NOTE NURSING/CASE MANAGEMENT/SOCIAL WORK - FINANCIAL ASSISTANCE
[facility] provides services at a reduced cost to those who are determined to be eligible through [facility]’s financial assistance program. Information regarding [facility]’s financial assistance program can be found by going to [email or web address] or by calling [phone].

## 2024-12-10 NOTE — PROGRESS NOTE ADULT - ASSESSMENT
48 y/o male with above pmhx, 12/10 lithotripsy and JEN to the RSFA, lithotripsy to the right popliteal via B/L femoral access. LFV Mynx placed, RFV manual pressure to site.

## 2024-12-10 NOTE — PROGRESS NOTE ADULT - PROBLEM SELECTOR PLAN 3
Continue taking your blood pressure medications as prescribed. Eat a heart healthy diet with low salt; exercise regularly (if cleared by your primary care doctor or cardiologist). Maintain a heart healthy weight and include healthy ways to manage stress. If you smoke, quit. If you need assistance to help you stop smoking, please use the following resource: [facility] – ([phone]). Follow up with your primary care doctor to continue having your blood pressure checked on a continual basis.

## 2024-12-10 NOTE — DISCHARGE NOTE NURSING/CASE MANAGEMENT/SOCIAL WORK - PATIENT PORTAL LINK FT
You can access the FollowMyHealth Patient Portal offered by [facility] by registering at the following website: [email or web address]. By joining [facility]’s FollowMyHealth portal, you will also be able to view your health information using other applications (apps) compatible with our system.

## 2024-12-10 NOTE — PROGRESS NOTE ADULT - SUBJECTIVE AND OBJECTIVE BOX
Patient is a 49y old  Male who presents with a chief complaint of PVD (09 Dec 2024 16:39)          Allergies    No Known Allergies    Intolerances        Medications:  amLODIPine   Tablet 10 milliGRAM(s) Oral daily  aspirin enteric coated 81 milliGRAM(s) Oral daily  clopidogrel Tablet 75 milliGRAM(s) Oral daily  ezetimibe 10 milliGRAM(s) Oral daily  hydrochlorothiazide 25 milliGRAM(s) Oral daily  metoprolol succinate ER 50 milliGRAM(s) Oral daily  sodium chloride 0.9%. 1000 milliLiter(s) IV Continuous <Continuous>      Vitals:  T(C): 37.1 (12-10-24 @ 00:00), Max: 37.1 (12-10-24 @ 00:00)  HR: 77 (12-10-24 @ 00:00) (72 - 83)  BP: 129/66 (12-10-24 @ 00:00) (129/66 - 181/87)  BP(mean): 104 (12-09-24 @ 18:40) (102 - 118)  RR: 17 (12-10-24 @ 00:00) (15 - 18)  SpO2: 96% (12-10-24 @ 00:00) (95% - 98%)  Wt(kg): --  Daily Height in cm: 167.64 (09 Dec 2024 11:58)    Daily   I&O's Summary        Physical Exam:  Appearance: Normal  Eyes: PERRL, EOMI  HENT: Normal oral [place], NC/AT  Cardiovascular: S1S2, RRR, No M/R/G, no JVD, No Lower extremity edema  Procedural Access Site: No hematoma, Non-tender to palpation, 2+ pulse, No bruit, No Ecchymosis  Respiratory: Clear to auscultation bilaterally  Gastrointestinal: Soft, Non tender, Normal Bowel Sounds  Musculoskeletal: No clubbing, No joint deformity   Neurologic: Non-focal  Lymphatic: No lymphadenopathy  Psychiatry: AAOx3, Mood & affect appropriate  Skin: No rashes, No ecchymoses, No cyanosis    12-09    140  |  99  |  19  ----------------------------<  110[H]  3.6   |  25  |  0.81    Ca    10.0      09 Dec 2024 11:57              Lipid panel   Hgb A1c                         15.6   12.10 )-----------( 217      ( 09 Dec 2024 11:57 )             45.8         ECG: SR 74 bpm

## 2024-12-10 NOTE — DISCHARGE NOTE NURSING/CASE MANAGEMENT/SOCIAL WORK - NSDCPEFALRISK_GEN_ALL_CORE
For information on Fall & Injury Prevention, visit: [email or web address] OR  [email or web address] OR  https://www.cdc.gov/steadi/patient.html

## 2024-12-18 ENCOUNTER — APPOINTMENT (OUTPATIENT)
Dept: CARDIOLOGY | Facility: CLINIC | Age: 49
End: 2024-12-18
Payer: COMMERCIAL

## 2024-12-18 VITALS
OXYGEN SATURATION: 98 % | WEIGHT: 190 LBS | HEART RATE: 82 BPM | SYSTOLIC BLOOD PRESSURE: 151 MMHG | HEIGHT: 67 IN | DIASTOLIC BLOOD PRESSURE: 83 MMHG | BODY MASS INDEX: 29.82 KG/M2

## 2024-12-18 DIAGNOSIS — E78.2 MIXED HYPERLIPIDEMIA: ICD-10-CM

## 2024-12-18 DIAGNOSIS — I73.9 PERIPHERAL VASCULAR DISEASE, UNSPECIFIED: ICD-10-CM

## 2024-12-18 DIAGNOSIS — R74.01 ELEVATION OF LEVELS OF LIVER TRANSAMINASE LEVELS: ICD-10-CM

## 2024-12-18 DIAGNOSIS — Z95.1 PRESENCE OF AORTOCORONARY BYPASS GRAFT: ICD-10-CM

## 2024-12-18 DIAGNOSIS — Z87.891 PERSONAL HISTORY OF NICOTINE DEPENDENCE: ICD-10-CM

## 2024-12-18 DIAGNOSIS — I25.10 ATHEROSCLEROTIC HEART DISEASE OF NATIVE CORONARY ARTERY W/OUT ANGINA PECTORIS: ICD-10-CM

## 2024-12-18 DIAGNOSIS — I10 ESSENTIAL (PRIMARY) HYPERTENSION: ICD-10-CM

## 2024-12-18 PROCEDURE — 99215 OFFICE O/P EST HI 40 MIN: CPT

## 2024-12-25 PROBLEM — F10.90 ALCOHOL USE: Status: ACTIVE | Noted: 2023-08-09

## 2025-03-03 PROCEDURE — C1725: CPT

## 2025-03-03 PROCEDURE — C1760: CPT

## 2025-03-03 PROCEDURE — C1894: CPT

## 2025-03-03 PROCEDURE — 37226: CPT

## 2025-03-03 PROCEDURE — 85027 COMPLETE CBC AUTOMATED: CPT

## 2025-03-03 PROCEDURE — C1753: CPT

## 2025-03-03 PROCEDURE — 80048 BASIC METABOLIC PNL TOTAL CA: CPT

## 2025-03-03 PROCEDURE — 36415 COLL VENOUS BLD VENIPUNCTURE: CPT

## 2025-03-03 PROCEDURE — C1887: CPT

## 2025-03-03 PROCEDURE — C1769: CPT

## 2025-03-03 PROCEDURE — 93005 ELECTROCARDIOGRAM TRACING: CPT

## 2025-03-03 PROCEDURE — 37252 INTRVASC US NONCORONARY 1ST: CPT

## 2025-03-03 PROCEDURE — C1773: CPT

## 2025-03-03 PROCEDURE — C1874: CPT

## 2025-03-03 PROCEDURE — 75716 ARTERY X-RAYS ARMS/LEGS: CPT | Mod: 59

## 2025-03-19 ENCOUNTER — APPOINTMENT (OUTPATIENT)
Dept: CARDIOLOGY | Facility: CLINIC | Age: 50
End: 2025-03-19
Payer: COMMERCIAL

## 2025-03-19 VITALS
DIASTOLIC BLOOD PRESSURE: 81 MMHG | SYSTOLIC BLOOD PRESSURE: 155 MMHG | WEIGHT: 190 LBS | HEART RATE: 79 BPM | BODY MASS INDEX: 29.76 KG/M2 | OXYGEN SATURATION: 97 %

## 2025-03-19 DIAGNOSIS — R74.01 ELEVATION OF LEVELS OF LIVER TRANSAMINASE LEVELS: ICD-10-CM

## 2025-03-19 DIAGNOSIS — I73.9 PERIPHERAL VASCULAR DISEASE, UNSPECIFIED: ICD-10-CM

## 2025-03-19 DIAGNOSIS — E78.2 MIXED HYPERLIPIDEMIA: ICD-10-CM

## 2025-03-19 DIAGNOSIS — Z95.1 PRESENCE OF AORTOCORONARY BYPASS GRAFT: ICD-10-CM

## 2025-03-19 DIAGNOSIS — Z87.891 PERSONAL HISTORY OF NICOTINE DEPENDENCE: ICD-10-CM

## 2025-03-19 DIAGNOSIS — I25.10 ATHEROSCLEROTIC HEART DISEASE OF NATIVE CORONARY ARTERY W/OUT ANGINA PECTORIS: ICD-10-CM

## 2025-03-19 DIAGNOSIS — I10 ESSENTIAL (PRIMARY) HYPERTENSION: ICD-10-CM

## 2025-03-19 PROCEDURE — 99215 OFFICE O/P EST HI 40 MIN: CPT

## 2025-03-19 PROCEDURE — G2211 COMPLEX E/M VISIT ADD ON: CPT

## 2025-05-27 ENCOUNTER — RX RENEWAL (OUTPATIENT)
Age: 50
End: 2025-05-27

## (undated) DEVICE — VESSEL LOOP KEY SURG MAXI BLUE 2.4X1.2MM

## (undated) DEVICE — SET PERF CARDIOPLEGIA 4 ARM STRL

## (undated) DEVICE — PACK CUSTOM W/INSPIRE OXYGENATOR

## (undated) DEVICE — DRAPE IOBAN 33" X 23"

## (undated) DEVICE — DRSG DERMABOND PRINEO 60CM

## (undated) DEVICE — SUMP PERICARDIAL 20FR 1/4" ADULT

## (undated) DEVICE — SUT SILK 4-0 17-18"

## (undated) DEVICE — SYR LUER LOK 3CC

## (undated) DEVICE — FOLEY TRAY 16FR 5CC LF LUBRISIL ADVANCE TEMP CLOSED

## (undated) DEVICE — TUBING TRUWAVE PRESSURE MALE/FEMALE 72"

## (undated) DEVICE — Device

## (undated) DEVICE — SYR ASEPTO

## (undated) DEVICE — SUT BLUNT SZ 5

## (undated) DEVICE — GETINGE VASOVIEW 7 ENDOSCOPIC VESSEL HARVESTING SYSTEM

## (undated) DEVICE — STABILIZER HAND ASSISTANT ATTACHMENT W STABLESOFT 2L

## (undated) DEVICE — ELCTR BOVIE TIP BLADE MEGADYNE E-Z CLEAN 2.5" (SHORT)

## (undated) DEVICE — CATH IV SAFE BC 24G X 0.75" (YELLOW)

## (undated) DEVICE — SOL IRR BAG NS 0.9% 3000ML

## (undated) DEVICE — DRSG OPSITE 13.75 X 4"

## (undated) DEVICE — BULLDOG SPRING CLIP LATIS/LATIS 6MM 1/2 FORCE (BLUE)

## (undated) DEVICE — CATH IV SAFE INSYTE 24G X 3/4" (YELLOW)

## (undated) DEVICE — TUBING KIT FAST START ATF 40

## (undated) DEVICE — PACING CABLE (BROWN) A/V TEMP SCREW DOWN 12FT

## (undated) DEVICE — SUT PLEDGET SOFT LARGE 3/8" X 3/16" X 1/16" X6

## (undated) DEVICE — CONNECTOR CARDIAC 1:1 FOR HUBLESS DRAINS

## (undated) DEVICE — SPECIMEN CONTAINER 100ML

## (undated) DEVICE — DRSG KLING 6"

## (undated) DEVICE — STOPCOCK 4-WAY (BLUE) DISCOFIX SPIN-LOCK CONNECTOR

## (undated) DEVICE — NDL ARTL 18G X 2 7/8

## (undated) DEVICE — DRSG ACE BANDAGE 6"

## (undated) DEVICE — DRAIN DRAINGE CHEST DRY ADL DUAL

## (undated) DEVICE — PACING CABLE (BLUE) ATRIAL TEMP SCREW DOWN 12FT

## (undated) DEVICE — MULTIPLE PERFUSION SET FEMALE 1 INLET LEG W 4 LEGS 15" (BLUE/RED)

## (undated) DEVICE — SUT PROLENE 5-0 36" RB-1

## (undated) DEVICE — PREP DURAPREP 26CC

## (undated) DEVICE — SUT SILK 5-0 60" TIES

## (undated) DEVICE — SUT POLYSORB 3-0 30" V-20 UNDYED

## (undated) DEVICE — SUT PROLENE 4-0 36" RB-1

## (undated) DEVICE — BAG DECANTER 2

## (undated) DEVICE — SUT PROLENE 6-0 30" C-1

## (undated) DEVICE — SUT SOFSILK 0 30" TIES

## (undated) DEVICE — STABILIZER HAND ASSISTANT ATTACHMENT W STABLESOFT 2S

## (undated) DEVICE — SPONGE PEANUT AUTO COUNT

## (undated) DEVICE — TOURNIQUET SET TOURNIKWIK 12FR (4 TUBES, 1 SNARE) 7.5"

## (undated) DEVICE — PHRENIC NERVE PAD MEDIUM

## (undated) DEVICE — GLV 8 PROTEXIS (CREAM) MICRO

## (undated) DEVICE — SUT MONOCRYL 4-0 18" PS-2

## (undated) DEVICE — FILTER REINFUSION FOR SALVAGED BLOOD DISP

## (undated) DEVICE — SYR LUER LOK 30CC

## (undated) DEVICE — SENSOR MYOCARDIAL TEMP 15MM

## (undated) DEVICE — DRAPE SLUSH / WARMER 44 X 66"

## (undated) DEVICE — ELCTR REM POLYHESIVE ADULT PT RETURN 15FT

## (undated) DEVICE — MEDTRONIC CLEARVIEW BLOWER MISTER KIT W TUBING SET

## (undated) DEVICE — DRSG TEGADERM 2.5X3"

## (undated) DEVICE — TUBING IV SET MICROCLAVE ADAPTER

## (undated) DEVICE — ELCTR BOVIE TIP BLADE MEGADYNE E-Z CLEAN 6.5" (LONG)

## (undated) DEVICE — DRSG CURITY GAUZE SPONGE 4 X 4" 12-PLY

## (undated) DEVICE — SYR LUER LOK 50CC

## (undated) DEVICE — WARMING BLANKET FULL UNDERBODY

## (undated) DEVICE — SUT PROLENE 3-0 36" SH

## (undated) DEVICE — DRSG OPSITE 2.5 X 2"

## (undated) DEVICE — BLOWER MISTER AXIUS WITH IV SET

## (undated) DEVICE — GOWN TRIMAX XXL

## (undated) DEVICE — SUT PROLENE 7-0 24" BV-1

## (undated) DEVICE — GLV 7 PROTEXIS (CREAM) MICRO

## (undated) DEVICE — SUT SOFSILK 4-0 24" CV-15

## (undated) DEVICE — SOL INJ LR 1000ML

## (undated) DEVICE — PACK CARDIAC YELLOW

## (undated) DEVICE — PACK UNIVERSAL CARDIAC

## (undated) DEVICE — DRSG DERMABOND PRINEO 22CM

## (undated) DEVICE — VESSEL LOOP MAXI-RED  0.120" X 16"

## (undated) DEVICE — SUT PROLENE 7-0 24" BV175-6

## (undated) DEVICE — SUCTION CATH ARGYLE DELEE TIP 10FR CHIMNEY VALVE COIL PACKED

## (undated) DEVICE — SUT PROLENE 4-0 36" SH

## (undated) DEVICE — STOPCOCK 4-WAY NYLON MALE LL ADAPTER LG BORE

## (undated) DEVICE — SUT SILK 2-0 18" SH (POP-OFF)

## (undated) DEVICE — SUT POLYSORB 2-0 30" V-20 UNDYED

## (undated) DEVICE — SAW BLADE MICROAIRE STERNUM 1X34X9.4MM

## (undated) DEVICE — GOWN TRIMAX LG

## (undated) DEVICE — DRAPE TOWEL BLUE 17" X 24"

## (undated) DEVICE — SUCTION CATH SAFE-T-VAC 12FR

## (undated) DEVICE — SOL NORMOSOL-R PH7.4 1000ML

## (undated) DEVICE — SUT VICRYL 1 36" CTX UNDYED

## (undated) DEVICE — NDL HYPO SAFE 18G X 1.5" (PINK)

## (undated) DEVICE — SUT ETHIBOND 2-0 36" SH

## (undated) DEVICE — SUT SILK 2-0 18" TIES